# Patient Record
Sex: FEMALE | Race: WHITE | Employment: OTHER | URBAN - METROPOLITAN AREA
[De-identification: names, ages, dates, MRNs, and addresses within clinical notes are randomized per-mention and may not be internally consistent; named-entity substitution may affect disease eponyms.]

---

## 2018-06-19 ENCOUNTER — HOSPITAL ENCOUNTER (OUTPATIENT)
Age: 63
Discharge: HOME | End: 2018-06-19
Payer: COMMERCIAL

## 2018-06-19 LAB
ALBUMIN SERPL-MCNC: 3.9 G/DL (ref 3.5–5.2)
ALP BLD-CCNC: 89 U/L (ref 35–104)
ALT SERPL-CCNC: 21 U/L (ref 0–32)
ANION GAP SERPL CALCULATED.3IONS-SCNC: 12 MMOL/L (ref 7–16)
AST SERPL-CCNC: 15 U/L (ref 0–31)
BILIRUB SERPL-MCNC: 0.4 MG/DL (ref 0–1.2)
BUN BLDV-MCNC: 9 MG/DL (ref 8–23)
CALCIUM SERPL-MCNC: 9.1 MG/DL (ref 8.6–10.2)
CHLORIDE BLD-SCNC: 97 MMOL/L (ref 98–107)
CO2: 29 MMOL/L (ref 22–29)
CREAT SERPL-MCNC: 0.6 MG/DL (ref 0.5–1)
GFR AFRICAN AMERICAN: >60
GFR NON-AFRICAN AMERICAN: >60 ML/MIN/1.73
GLUCOSE BLD-MCNC: 138 MG/DL (ref 74–109)
HBA1C MFR BLD: 7.2 % (ref 4.8–5.9)
HCT VFR BLD CALC: 42.9 % (ref 34–48)
HEMOGLOBIN: 14.4 G/DL (ref 11.5–15.5)
MCH RBC QN AUTO: 31.8 PG (ref 26–35)
MCHC RBC AUTO-ENTMCNC: 33.6 % (ref 32–34.5)
MCV RBC AUTO: 94.7 FL (ref 80–99.9)
PDW BLD-RTO: 12.7 FL (ref 11.5–15)
PLATELET # BLD: 227 E9/L (ref 130–450)
PMV BLD AUTO: 9.9 FL (ref 7–12)
POTASSIUM SERPL-SCNC: 4.3 MMOL/L (ref 3.5–5)
RBC # BLD: 4.53 E12/L (ref 3.5–5.5)
SODIUM BLD-SCNC: 138 MMOL/L (ref 132–146)
TOTAL PROTEIN: 7.1 G/DL (ref 6.4–8.3)
VITAMIN D 25-HYDROXY: 16 NG/ML (ref 30–100)
WBC # BLD: 7 E9/L (ref 4.5–11.5)

## 2018-06-19 PROCEDURE — 80053 COMPREHEN METABOLIC PANEL: CPT

## 2018-06-19 PROCEDURE — 85027 COMPLETE CBC AUTOMATED: CPT

## 2018-06-19 PROCEDURE — 36415 COLL VENOUS BLD VENIPUNCTURE: CPT

## 2018-06-19 PROCEDURE — 83036 HEMOGLOBIN GLYCOSYLATED A1C: CPT

## 2018-06-19 PROCEDURE — 82306 VITAMIN D 25 HYDROXY: CPT

## 2018-11-10 ENCOUNTER — HOSPITAL ENCOUNTER (OUTPATIENT)
Dept: GENERAL RADIOLOGY | Age: 63
Discharge: HOME | End: 2018-11-12
Payer: COMMERCIAL

## 2018-11-10 ENCOUNTER — HOSPITAL ENCOUNTER (OUTPATIENT)
Age: 63
Discharge: HOME | End: 2018-11-12
Payer: COMMERCIAL

## 2018-11-10 ENCOUNTER — HOSPITAL ENCOUNTER (OUTPATIENT)
Age: 63
Discharge: HOME | End: 2018-11-10
Payer: COMMERCIAL

## 2018-11-10 DIAGNOSIS — M79.5 FOREIGN BODY (FB) IN SOFT TISSUE: ICD-10-CM

## 2018-11-10 LAB
ALBUMIN SERPL-MCNC: 4.2 G/DL (ref 3.5–5.2)
ALP BLD-CCNC: 91 U/L (ref 35–104)
ALT SERPL-CCNC: 21 U/L (ref 0–32)
ANION GAP SERPL CALCULATED.3IONS-SCNC: 10 MMOL/L (ref 7–16)
AST SERPL-CCNC: 17 U/L (ref 0–31)
BILIRUB SERPL-MCNC: 0.4 MG/DL (ref 0–1.2)
BUN BLDV-MCNC: 9 MG/DL (ref 8–23)
CALCIUM SERPL-MCNC: 9.2 MG/DL (ref 8.6–10.2)
CHLORIDE BLD-SCNC: 97 MMOL/L (ref 98–107)
CHOLESTEROL, TOTAL: 187 MG/DL (ref 0–199)
CO2: 31 MMOL/L (ref 22–29)
CREAT SERPL-MCNC: 0.7 MG/DL (ref 0.5–1)
GFR AFRICAN AMERICAN: >60
GFR NON-AFRICAN AMERICAN: >60 ML/MIN/1.73
GLUCOSE BLD-MCNC: 140 MG/DL (ref 74–99)
HBA1C MFR BLD: 7 % (ref 4–5.6)
HCT VFR BLD CALC: 44.7 % (ref 34–48)
HDLC SERPL-MCNC: 44 MG/DL
HEMOGLOBIN: 14.8 G/DL (ref 11.5–15.5)
LDL CHOLESTEROL CALCULATED: 119 MG/DL (ref 0–99)
MCH RBC QN AUTO: 31.7 PG (ref 26–35)
MCHC RBC AUTO-ENTMCNC: 33.1 % (ref 32–34.5)
MCV RBC AUTO: 95.7 FL (ref 80–99.9)
PDW BLD-RTO: 12.7 FL (ref 11.5–15)
PLATELET # BLD: 254 E9/L (ref 130–450)
PMV BLD AUTO: 10.3 FL (ref 7–12)
POTASSIUM SERPL-SCNC: 4.4 MMOL/L (ref 3.5–5)
RBC # BLD: 4.67 E12/L (ref 3.5–5.5)
SODIUM BLD-SCNC: 138 MMOL/L (ref 132–146)
TOTAL PROTEIN: 7.4 G/DL (ref 6.4–8.3)
TRIGL SERPL-MCNC: 120 MG/DL (ref 0–149)
TSH SERPL DL<=0.05 MIU/L-ACNC: 2.42 UIU/ML (ref 0.27–4.2)
VLDLC SERPL CALC-MCNC: 24 MG/DL
WBC # BLD: 7.2 E9/L (ref 4.5–11.5)

## 2018-11-10 PROCEDURE — 36415 COLL VENOUS BLD VENIPUNCTURE: CPT

## 2018-11-10 PROCEDURE — 84443 ASSAY THYROID STIM HORMONE: CPT

## 2018-11-10 PROCEDURE — 85027 COMPLETE CBC AUTOMATED: CPT

## 2018-11-10 PROCEDURE — 80053 COMPREHEN METABOLIC PANEL: CPT

## 2018-11-10 PROCEDURE — 83036 HEMOGLOBIN GLYCOSYLATED A1C: CPT

## 2018-11-10 PROCEDURE — 80061 LIPID PANEL: CPT

## 2018-11-10 PROCEDURE — 73130 X-RAY EXAM OF HAND: CPT

## 2019-02-27 ENCOUNTER — HOSPITAL ENCOUNTER (OUTPATIENT)
Age: 64
Discharge: HOME | End: 2019-02-27
Payer: COMMERCIAL

## 2019-02-27 LAB
ALBUMIN SERPL-MCNC: 3.9 G/DL (ref 3.5–5.2)
ALP BLD-CCNC: 96 U/L (ref 35–104)
ALT SERPL-CCNC: 28 U/L (ref 0–32)
ANION GAP SERPL CALCULATED.3IONS-SCNC: 10 MMOL/L (ref 7–16)
AST SERPL-CCNC: 17 U/L (ref 0–31)
BASOPHILS ABSOLUTE: 0.05 E9/L (ref 0–0.2)
BASOPHILS RELATIVE PERCENT: 0.6 % (ref 0–2)
BILIRUB SERPL-MCNC: 0.5 MG/DL (ref 0–1.2)
BUN BLDV-MCNC: 10 MG/DL (ref 8–23)
CALCIUM SERPL-MCNC: 9 MG/DL (ref 8.6–10.2)
CHLORIDE BLD-SCNC: 98 MMOL/L (ref 98–107)
CHOLESTEROL, TOTAL: 165 MG/DL (ref 0–199)
CO2: 27 MMOL/L (ref 22–29)
CREAT SERPL-MCNC: 0.6 MG/DL (ref 0.5–1)
EOSINOPHILS ABSOLUTE: 0.13 E9/L (ref 0.05–0.5)
EOSINOPHILS RELATIVE PERCENT: 1.7 % (ref 0–6)
GFR AFRICAN AMERICAN: >60
GFR NON-AFRICAN AMERICAN: >60 ML/MIN/1.73
GLUCOSE BLD-MCNC: 235 MG/DL (ref 74–99)
HBA1C MFR BLD: 9.3 % (ref 4–5.6)
HCT VFR BLD CALC: 47 % (ref 34–48)
HDLC SERPL-MCNC: 32 MG/DL
HEMOGLOBIN: 15.3 G/DL (ref 11.5–15.5)
IMMATURE GRANULOCYTES #: 0.02 E9/L
IMMATURE GRANULOCYTES %: 0.3 % (ref 0–5)
LDL CHOLESTEROL CALCULATED: 108 MG/DL (ref 0–99)
LYMPHOCYTES ABSOLUTE: 2.87 E9/L (ref 1.5–4)
LYMPHOCYTES RELATIVE PERCENT: 37.3 % (ref 20–42)
MCH RBC QN AUTO: 31 PG (ref 26–35)
MCHC RBC AUTO-ENTMCNC: 32.6 % (ref 32–34.5)
MCV RBC AUTO: 95.1 FL (ref 80–99.9)
MONOCYTES ABSOLUTE: 0.38 E9/L (ref 0.1–0.95)
MONOCYTES RELATIVE PERCENT: 4.9 % (ref 2–12)
NEUTROPHILS ABSOLUTE: 4.25 E9/L (ref 1.8–7.3)
NEUTROPHILS RELATIVE PERCENT: 55.2 % (ref 43–80)
PDW BLD-RTO: 12.6 FL (ref 11.5–15)
PLATELET # BLD: 261 E9/L (ref 130–450)
PMV BLD AUTO: 10.1 FL (ref 7–12)
POTASSIUM SERPL-SCNC: 4.3 MMOL/L (ref 3.5–5)
RBC # BLD: 4.94 E12/L (ref 3.5–5.5)
SODIUM BLD-SCNC: 135 MMOL/L (ref 132–146)
TOTAL PROTEIN: 7 G/DL (ref 6.4–8.3)
TRIGL SERPL-MCNC: 127 MG/DL (ref 0–149)
VLDLC SERPL CALC-MCNC: 25 MG/DL
WBC # BLD: 7.7 E9/L (ref 4.5–11.5)

## 2019-02-27 PROCEDURE — 83036 HEMOGLOBIN GLYCOSYLATED A1C: CPT

## 2019-02-27 PROCEDURE — 36415 COLL VENOUS BLD VENIPUNCTURE: CPT

## 2019-02-27 PROCEDURE — 80053 COMPREHEN METABOLIC PANEL: CPT

## 2019-02-27 PROCEDURE — 85025 COMPLETE CBC W/AUTO DIFF WBC: CPT

## 2019-02-27 PROCEDURE — 80061 LIPID PANEL: CPT

## 2020-01-14 ENCOUNTER — HOSPITAL ENCOUNTER (OUTPATIENT)
Age: 65
Discharge: HOME | End: 2020-01-14
Payer: COMMERCIAL

## 2020-01-14 LAB
ALBUMIN SERPL-MCNC: 4.1 G/DL (ref 3.5–5.2)
ALP BLD-CCNC: 89 U/L (ref 35–104)
ALT SERPL-CCNC: 25 U/L (ref 0–32)
ANION GAP SERPL CALCULATED.3IONS-SCNC: 10 MMOL/L (ref 7–16)
AST SERPL-CCNC: 16 U/L (ref 0–31)
BILIRUB SERPL-MCNC: 0.2 MG/DL (ref 0–1.2)
BUN BLDV-MCNC: 10 MG/DL (ref 8–23)
CALCIUM SERPL-MCNC: 9.7 MG/DL (ref 8.6–10.2)
CHLORIDE BLD-SCNC: 98 MMOL/L (ref 98–107)
CO2: 30 MMOL/L (ref 22–29)
CREAT SERPL-MCNC: 0.6 MG/DL (ref 0.5–1)
GFR AFRICAN AMERICAN: >60
GFR NON-AFRICAN AMERICAN: >60 ML/MIN/1.73
GLUCOSE FASTING: 150 MG/DL (ref 74–99)
HBA1C MFR BLD: 7.3 % (ref 4–5.6)
POTASSIUM SERPL-SCNC: 4.4 MMOL/L (ref 3.5–5)
SODIUM BLD-SCNC: 138 MMOL/L (ref 132–146)
TOTAL PROTEIN: 7.1 G/DL (ref 6.4–8.3)

## 2020-01-14 PROCEDURE — 83036 HEMOGLOBIN GLYCOSYLATED A1C: CPT

## 2020-01-14 PROCEDURE — 80053 COMPREHEN METABOLIC PANEL: CPT

## 2020-01-14 PROCEDURE — 36415 COLL VENOUS BLD VENIPUNCTURE: CPT

## 2020-06-26 LAB
AVERAGE GLUCOSE: NORMAL
HBA1C MFR BLD: 7.2 %
VITAMIN D 25-HYDROXY: 18
VITAMIN D2, 25 HYDROXY: NORMAL
VITAMIN D3,25 HYDROXY: NORMAL

## 2020-07-06 ENCOUNTER — HOSPITAL ENCOUNTER (OUTPATIENT)
Dept: MAMMOGRAPHY | Age: 65
Discharge: HOME | End: 2020-07-08
Payer: COMMERCIAL

## 2020-07-06 ENCOUNTER — HOSPITAL ENCOUNTER (OUTPATIENT)
Dept: GENERAL RADIOLOGY | Age: 65
Discharge: HOME | End: 2020-07-08
Payer: COMMERCIAL

## 2020-07-06 PROCEDURE — 77063 BREAST TOMOSYNTHESIS BI: CPT

## 2020-07-06 PROCEDURE — 71046 X-RAY EXAM CHEST 2 VIEWS: CPT

## 2020-07-20 ENCOUNTER — HOSPITAL ENCOUNTER (OUTPATIENT)
Dept: MAMMOGRAPHY | Age: 65
Discharge: HOME | End: 2020-07-22
Payer: COMMERCIAL

## 2020-07-20 ENCOUNTER — HOSPITAL ENCOUNTER (OUTPATIENT)
Dept: ULTRASOUND IMAGING | Age: 65
Discharge: HOME | End: 2020-07-20
Payer: COMMERCIAL

## 2020-07-20 PROCEDURE — 76642 ULTRASOUND BREAST LIMITED: CPT

## 2020-07-20 PROCEDURE — G0279 TOMOSYNTHESIS, MAMMO: HCPCS

## 2020-07-31 ENCOUNTER — HOSPITAL ENCOUNTER (OUTPATIENT)
Dept: GENERAL RADIOLOGY | Age: 65
Discharge: HOME | End: 2020-08-02
Payer: COMMERCIAL

## 2020-07-31 PROCEDURE — 2500000003 HC RX 250 WO HCPCS

## 2020-07-31 PROCEDURE — 77065 DX MAMMO INCL CAD UNI: CPT

## 2020-07-31 PROCEDURE — A4648 IMPLANTABLE TISSUE MARKER: HCPCS

## 2020-07-31 PROCEDURE — 88305 TISSUE EXAM BY PATHOLOGIST: CPT

## 2020-07-31 PROCEDURE — 88342 IMHCHEM/IMCYTCHM 1ST ANTB: CPT

## 2020-07-31 PROCEDURE — 88360 TUMOR IMMUNOHISTOCHEM/MANUAL: CPT

## 2020-07-31 NOTE — PROGRESS NOTES
Met with patient prior to her breast biopsy. Instructed on role of breast navigator and on breast biopsy procedure. Denies use of blood thinners or aspirin products within the past 5 days. I remained with her during the biopsy to provide instruction and emotional support. Upon questioning regarding results notification, patient indicates that she would like to receive breast biopsy results by phone via the breast navigator. Instructed that results will be available in approximately 3-5 days. Instructed that her physician will also be notified of results. Provided with folder containing my contact information, monthly breast self exam card, and post biopsy discharge instructions. Instructed to call me if she has any questions or concerns about her biopsy. Verbalizes understanding.  RACHEL Cannon, OCN, CN-BN

## 2020-08-05 ENCOUNTER — TELEPHONE (OUTPATIENT)
Dept: GENERAL RADIOLOGY | Age: 65
End: 2020-08-05

## 2020-08-07 ENCOUNTER — TELEPHONE (OUTPATIENT)
Dept: GENERAL RADIOLOGY | Age: 65
End: 2020-08-07

## 2020-08-07 NOTE — TELEPHONE ENCOUNTER
Left message at 's office to update me re: status of referral to surgeon regarding breast biopsy findings.  Electronically signed by Renée Altamirano RN, BSN on 8/7/2020 at 2:39 PM

## 2020-08-17 NOTE — PROGRESS NOTES
Subjective:      Patient ID: Isis Winters is a 60 y.o. female. HPI  History and Physical    Patient's Name/Date of Birth: Isis Winters / 1955    Date: 2020      Isis Winters presents for evaluation of newly diagnosed right breast cancer. PCP: Marietta Bowens DO, Gynecologist: Dr. Sterling Parikh. The lesion is located in the 11 o'clock position of the right breast. The lesion was discovered by mammogram. The patient states she is not consistent with self breast exams. Patient denies nipple discharge. Patient denies a personal history of breast cancer. Breast cancer risk factors include age, gender, family history of breast cancer, menopause after age 50. Ashkenazi Scientologist Ancestry: No.    OBSTETRIC RELATED HISTORY:  Age of menarche was 17. Age of menopause was 48. Patient denies hormonal therapy. Patient is . Age of first live birth was 34. Patient did breast feed. Is patient interested in fertility information about fertility preservation? N/A    CANCER SURVEILLANCE HISTORY:  Mammograms: Yes  Breast MRI's: No   Breast Biopsies: Yes   Colonoscopy: Yes   GI Polyps: Yes - benign  EGD: No   Pelvic Exam: Yes - 6 years ago  Pap Smear: Yes   Dermatology: Yes - 10 years ago, precancerous lesion on leg  Lung screening: no      Have you received your flu vaccination this year? yes  Have you received your Pneumococcal vaccination? Yes      Estimated body mass index is 26.63 kg/m² as calculated from the following:    Height as of 13: 5' 6\" (1.676 m). Weight as of 13: 165 lb (74.8 kg). Bra Size: 38 C    Because violence is so common, we ask all our patients: are you in a relationship or do you live with a person who threatens, hurts, or controls you:  Patient denies. Patient drinks significant caffeinated beverages (pot a day). Patient is a former smoker. She smoked for 10 years after graduating from college.   Patient does not use recreational drugs. Past Medical History:   Diagnosis Date    History of cardiovascular stress test 07/18/13    stress echo done       Past Surgical History:   Procedure Laterality Date    ECHOCARDIOGRAM EXERCISE STRESS TEST  7/18/2013         US BREAST NEEDLE BIOPSY RIGHT  7/31/2020    US BREAST NEEDLE BIOPSY RIGHT 7/31/2020 [facility]ARC[facility]       Current Outpatient Medications   Medication Sig Dispense Refill    Terbinafine HCl (LAMISIL PO) Take 250 mg by mouth daily.  Naproxen Sodium (ALEVE PO) Take  by mouth as needed. No current facility-administered medications for this visit.         Allergies   Allergen Reactions    Motrin [Ibuprofen] Hives       Family History   Problem Relation Age of Onset    Heart Disease Mother        Social History     Socioeconomic History    Marital status:      Spouse name: Not on file    Number of children: Not on file    Years of education: Not on file    Highest education level: Not on file   Occupational History    Not on file   Social Needs    Financial resource strain: Not on file    Food insecurity     Worry: Not on file     Inability: Not on file    Transportation needs     Medical: Not on file     Non-medical: Not on file   Tobacco Use    Smoking status: Current Every Day Smoker     Packs/day: 0.50     Years: 20.00     Pack years: 10.00   Substance and Sexual Activity    Alcohol use: Not on file    Drug use: Not on file    Sexual activity: Not on file   Lifestyle    Physical activity     Days per week: Not on file     Minutes per session: Not on file    Stress: Not on file   Relationships    Social connections     Talks on phone: Not on file     Gets together: Not on file     Attends Pentecostal service: Not on file     Active member of club or organization: Not on file     Attends meetings of clubs or organizations: Not on file     Relationship status: Not on file    Intimate partner violence     Fear of current or ex partner: Not on file     Emotionally abused: Not on file     Physically abused: Not on file     Forced sexual activity: Not on file   Other Topics Concern    Not on file   Social History Narrative    Not on file       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 Enjoys volunteer work. Quit smoking 3 years ago (after re-starting while teaching at [address] Review of Systems  CONSTITUTIONAL: No fever, chills. Good appetite and energy level. Having some hot flashes that last a long time. Not just at night. ENMT: Eyes: No diplopia; Nose: No epistaxis. Mouth: No sore throat. RESPIRATORY: No hemoptysis, shortness of breath, cough. CARDIOVASCULAR: No chest pain, pressure, or palpitations. GASTROINTESTINAL: No nausea/vomiting, abdominal pain, diarrhea/constipation. No blood in the stools. GENITOURINARY: No dysuria, urinary frequency, hematuria. NEURO: No syncope, presyncope, headache. Remainder:  ROS NEGATIVE    Patient denies previous history of DVT/PE. Review of systems as noted above completely reviewed with patient. No changes. Objective:   Physical Exam  Constitutional:       General: She is not in acute distress. Appearance: She is well-developed. She is not diaphoretic. HENT:      Head: Normocephalic and atraumatic. Eyes:      Conjunctiva/sclera: Conjunctivae normal.      Pupils: Pupils are equal, round, and reactive to light. Neck:      Musculoskeletal: Normal range of motion and neck supple. Thyroid: No thyromegaly. Trachea: No tracheal deviation. Cardiovascular:      Rate and Rhythm: Normal rate and regular rhythm. Heart sounds: Normal heart sounds. Pulmonary:      Effort: Pulmonary effort is normal. No respiratory distress. Breath sounds: Normal breath sounds. Chest:      Breasts: Breasts are symmetrical.         Right: No inverted nipple, mass, nipple discharge, skin change or tenderness.          Left: No inverted nipple, mass, nipple discharge, skin change or tenderness. Abdominal:      General: There is no distension. Palpations: Abdomen is soft. Musculoskeletal:      Right shoulder: She exhibits normal range of motion. Left shoulder: She exhibits normal range of motion. Right elbow: She exhibits no swelling. Right wrist: She exhibits no swelling. Lymphadenopathy:      Cervical: No cervical adenopathy. Upper Body:      Right upper body: Axillary adenopathy present. No supraclavicular adenopathy. Left upper body: No supraclavicular adenopathy. Skin:     General: Skin is warm and dry. Coloration: Skin is not pale. Findings: No erythema. Neurological:      Mental Status: She is alert and oriented to person, place, and time. Psychiatric:         Behavior: Behavior normal.         Thought Content: Thought content normal.         Judgment: Judgment normal.             Assessment:      60 y.o. woman who underwent a right breast ultrasound guided biopsy at the 11:00 position on July 31, 2020. Pathology completed at [address]:    Right breast, 11:00, biopsy:   Invasive ductal carcinoma, with focal mucinous features, see comment. Comment:   The invasive carcinoma is Ann Marie grade 2 (score of 6): tubule   formation score 3, nuclear pleomorphism score 1, mitotic activity score   2. P63 immunostain is negative for myoepithelial layer within the invasive   carcinoma.      Breast Cancer Marker Studies:   Estrogen Receptors (ER):   -Positive (>10%of cells demonstrate nuclear positivity):   Percentage of cells positive: 99%   Intensity: moderate-to-strong)     Progesterone Receptors (NJ):   -Positive:   Percentage of cells positive: 40%   Intensity: moderate     Hormone receptor studies are performed by immunohistochemistry on   formalin-fixed, paraffin-embedded tissue (Roche Benchmark Immunostainer,   [place] anti-ER clone [id number], anti-NJ clone [id number], polymer-based detection malignancy.  Ultrasound-guided core    biopsy is suggested.         BIRADS:    BIRADS - CATEGORY 5        8/25/2020 CBC & CMP:  8/25/2020 10:32 AM - ChristianoKit burnett Incoming Results From [facility]     Component  Value  Ref Range & Units  Status  Collected  Lab    WBC  7.2  4.5 - 11.5 E9/L  Final  08/25/2020 10:00 AM  [facility]    RBC  4.50  3.50 - 5.50 E12/L  Final  08/25/2020 10:00 AM  [facility]    Hemoglobin  13.8  11.5 - 15.5 g/dL  Final  08/25/2020 10:00 AM  [facility] Lab    Hematocrit  43.1  34.0 - 48.0 %  Final  08/25/2020 10:00 AM  [facility] Lab    MCV  95.8  80.0 - 99.9 fL  Final  08/25/2020 10:00 AM  [facility]    MCH  30.7  26.0 - 35.0 pg  Final  08/25/2020 10:00 AM  [facility]    MCHC  32.0  32.0 - 34.5 %  Final  08/25/2020 10:00 AM  [facility]    RDW  12.8  11.5 - 15.0 fL  Final  08/25/2020 10:00 AM  [address] Lab    Platelets  135  188 - 450 E9/L  Final  08/25/2020 10:00 AM  [facility]    MPV  10.2  7.0 - 12.0 fL  Final  08/25/2020 10:00 AM  [facility]    Neutrophils %  55.8  43.0 - 80.0 %  Final  08/25/2020 10:00 AM  [facility]    Immature Granulocytes %  0.1  0.0 - 5.0 %  Final  08/25/2020 10:00 AM  [facility]    Lymphocytes %  36.5  20.0 - 42.0 %  Final  08/25/2020 10:00 AM  [facility]    Monocytes %  5.2  2.0 - 12.0 %  Final  08/25/2020 10:00 AM  [facility]    Eosinophils %  1.8  0.0 - 6.0 %  Final  08/25/2020 10:00 AM  [facility]    Basophils %  0.6  0.0 - 2.0 %  Final  08/25/2020 10:00 AM  [facility]    Neutrophils Absolute  4.00  1.80 - 7.30 E9/L  Final  08/25/2020 10:00 AM  [facility]    Immature Granulocytes #  0.01  E9/L Final  08/25/2020 10:00 AM  [facility]    Lymphocytes Absolute  2.62  1.50 - 4.00 E9/L  Final  08/25/2020 10:00 AM  [facility]    Monocytes Absolute  0.37  0.10 - 0.95 E9/L  Final  08/25/2020 10:00 AM  [facility]    Eosinophils Absolute  0.13  0.05 - 0.50 E9/L  Final  08/25/2020 10:00 AM  [facility]    Basophils Absolute  0.04  0.00 - 0.20 E9/L  Final  08/25/2020 10:00 AM  [facility]        8/25/2020 10:58 AM - Kit Alvarado Incoming Results From [facility]     Component  Value  Ref Range & Units  Status  Collected  Lab    Sodium  137  132 - 146 mmol/L  Final  08/25/2020 10:00 AM  [facility]    Potassium  4.3  3.5 - 5.0 mmol/L  Final  08/25/2020 10:00 AM  [facility]    Chloride  100  98 - 107 mmol/L  Final  08/25/2020 10:00 AM  [facility] Lab    CO2  27  22 - 29 mmol/L  Final  08/25/2020 10:00 AM  [facility]    Anion Gap  10  7 - 16 mmol/L  Final  08/25/2020 10:00 AM  [facility]    Glucose  150High    74 - 99 mg/dL  Final  08/25/2020 10:00 AM  [facility] Lab    BUN  12  8 - 23 mg/dL  Final  08/25/2020 10:00 AM  [facility]    CREATININE  0.6  0.5 - 1.0 mg/dL  Final  08/25/2020 10:00 AM  [facility]    GFR Non-African American  >60  >=60 mL/min/1.73  Final  08/25/2020 10:00 AM  [facility]    Chronic Kidney Disease: less than 60 ml/min/1.73 sq. m.         Kidney Failure: less than 15 ml/min/1.73 sq.m. Results valid for patients 18 years and older.     GFR   >60   Final  08/25/2020 10:00 AM  [facility] Lab    Calcium  9.1  8.6 - 10.2 mg/dL  Final  08/25/2020 10:00 AM  [facility]    Total Protein  6.7  6.4 - 8.3 g/dL  Final  08/25/2020 10:00 AM  [facility] for conservative therapy. Questions answered to patient satisfaction. Plan:      1. Metastatic workup to include CXR, CBC, CMP, completed previously. 2. Breast MRI completed. 3. Genetic testing sample obtained. 4. Patient has met with our Breast Navigator for information and to receive literature. 5. If indicated outside slides will be obtained and reviewed by Pathology at [facility]. 6. She would likely be a candidate for conservative therapy pending results of genetic testing. .    7. We had a discussion of the treatment options for breast cancer including risks, benefits, and recurrence rates for breast conservation therapy versus mastectomy. We discussed reconstruction options. We discussed the indications and risks of sentinel lymph node biopsy and possibility of axillary lymph node dissection. We also discussed the possible role of systemic and radiation therapy. NCCN guidelines were utilized in our discussion. The patient was given the appropriate contact numbers and will call with any questions or concerns. Face-to-face time 50 minutes, greater than 50% in counseling, education, and coordination of care. My final recommendation will be communicated back to the referring physician by way of shared medical record and/or written letter via [address] [facility] [facility] mail. I personally and independently saw and examined patient and reviewed all pertinent laboratory data and imaging studies. I have reviewed and agree with the CNP history and review of systems as documented in the above note. This document is generated, in part, by voice recognition software and thus syntax and grammatical errors are possible. Yuli Prasad MD [facility]  August 31, 2020

## 2020-08-19 ENCOUNTER — TELEPHONE (OUTPATIENT)
Dept: BREAST CENTER | Age: 65
End: 2020-08-19

## 2020-08-19 NOTE — TELEPHONE ENCOUNTER
Patient notified about [facility] multidisciplinary conference recommendations which includes a breast MRI. Patient agrees to proceed and is aware to get lab work at a nearby facility if the MRI is prior to her consult with us.

## 2020-08-19 NOTE — TELEPHONE ENCOUNTER
Spoke with patient and updated obstetric related and cancer surveillance history. This information will be used for medical decision making and planning for the upcoming surgical consultation on August 31, 2020 with Dr. Yuliana Orozco.     Patient will stop in to have lab work and genetic testing done next Tuesday around 9:30am.

## 2020-08-21 ENCOUNTER — TELEPHONE (OUTPATIENT)
Dept: BREAST CENTER | Age: 65
End: 2020-08-21

## 2020-08-21 NOTE — TELEPHONE ENCOUNTER
No prior authorization required for breast MRI 12304 -- per computerized system with [employer]. Provider and facility are in network so no authorization is required.

## 2020-08-25 ENCOUNTER — HOSPITAL ENCOUNTER (OUTPATIENT)
Age: 65
Discharge: HOME | End: 2020-08-27
Payer: COMMERCIAL

## 2020-08-25 LAB
ALBUMIN SERPL-MCNC: 4 G/DL (ref 3.5–5.2)
ALP BLD-CCNC: 81 U/L (ref 35–104)
ALT SERPL-CCNC: 28 U/L (ref 0–32)
ANION GAP SERPL CALCULATED.3IONS-SCNC: 10 MMOL/L (ref 7–16)
AST SERPL-CCNC: 19 U/L (ref 0–31)
BASOPHILS ABSOLUTE: 0.04 E9/L (ref 0–0.2)
BASOPHILS RELATIVE PERCENT: 0.6 % (ref 0–2)
BILIRUB SERPL-MCNC: <0.2 MG/DL (ref 0–1.2)
BUN BLDV-MCNC: 12 MG/DL (ref 8–23)
CALCIUM SERPL-MCNC: 9.1 MG/DL (ref 8.6–10.2)
CHLORIDE BLD-SCNC: 100 MMOL/L (ref 98–107)
CO2: 27 MMOL/L (ref 22–29)
CREAT SERPL-MCNC: 0.6 MG/DL (ref 0.5–1)
EOSINOPHILS ABSOLUTE: 0.13 E9/L (ref 0.05–0.5)
EOSINOPHILS RELATIVE PERCENT: 1.8 % (ref 0–6)
GFR AFRICAN AMERICAN: >60
GFR NON-AFRICAN AMERICAN: >60 ML/MIN/1.73
GLUCOSE BLD-MCNC: 150 MG/DL (ref 74–99)
HCT VFR BLD CALC: 43.1 % (ref 34–48)
HEMOGLOBIN: 13.8 G/DL (ref 11.5–15.5)
IMMATURE GRANULOCYTES #: 0.01 E9/L
IMMATURE GRANULOCYTES %: 0.1 % (ref 0–5)
LYMPHOCYTES ABSOLUTE: 2.62 E9/L (ref 1.5–4)
LYMPHOCYTES RELATIVE PERCENT: 36.5 % (ref 20–42)
MCH RBC QN AUTO: 30.7 PG (ref 26–35)
MCHC RBC AUTO-ENTMCNC: 32 % (ref 32–34.5)
MCV RBC AUTO: 95.8 FL (ref 80–99.9)
MONOCYTES ABSOLUTE: 0.37 E9/L (ref 0.1–0.95)
MONOCYTES RELATIVE PERCENT: 5.2 % (ref 2–12)
NEUTROPHILS ABSOLUTE: 4 E9/L (ref 1.8–7.3)
NEUTROPHILS RELATIVE PERCENT: 55.8 % (ref 43–80)
PDW BLD-RTO: 12.8 FL (ref 11.5–15)
PLATELET # BLD: 254 E9/L (ref 130–450)
PMV BLD AUTO: 10.2 FL (ref 7–12)
POTASSIUM SERPL-SCNC: 4.3 MMOL/L (ref 3.5–5)
RBC # BLD: 4.5 E12/L (ref 3.5–5.5)
SODIUM BLD-SCNC: 137 MMOL/L (ref 132–146)
TOTAL PROTEIN: 6.7 G/DL (ref 6.4–8.3)
WBC # BLD: 7.2 E9/L (ref 4.5–11.5)

## 2020-08-25 PROCEDURE — 80053 COMPREHEN METABOLIC PANEL: CPT

## 2020-08-25 PROCEDURE — 85025 COMPLETE CBC W/AUTO DIFF WBC: CPT

## 2020-08-25 NOTE — PROGRESS NOTES
Upon educating the patient, she meets criteria for testing of BRCA related mutations implicated in breast and ovarian cancer. This is by virtue of her having a diagnosis of breast cancer combined with either one of the following criteria as described by NCCN guidelines:  Personal history of breast cancer at age 60 + one or more of the following:     Family History   Problem Relation Age of Onset    Heart Disease Mother      Cancer Father 46         esophageal and lung    Cancer Maternal Aunt 48         breast    Cancer Paternal Grandmother [de-identified]         throat and neck    Cancer Maternal Cousin 48         breast    Cancer Other           maternal great aunt - breast    Cancer Maternal Aunt 48         breast    Cancer Maternal Cousin 54         breast     Pre-Test Education and Risk Assessment During the patient's first visit, the patient has completed the \"Family History Questionnaire\" along with personal information pertinent to assessing risk factors. This information is used to complete the genetic assessment. Informed Consent Procedures Education along with the information guide \"Hereditary Breast and Ovarian Cancer Syndrome, A Patient's Guide to risk assessment\" is provided to the patient with additional resources listed with the information guide. Informed consent is obtained for all genetic testing, and the limitations and benefits of testing are discussed. The specific type of test to be completed, the cost of the tests, possible test results, and the implications of these results are reviewed with the individual. Written consent is obtained prior to testing. Testing  Confidentiality Standards Privacy is maintained in accordance with institutional guidelines. No patient files are coded. Information obtained is kept in a secure medical record.  Any information regarding genetic testing cannot be released without the written consent of the individual.   Testing Genetic testing is coordinated and sent to in-house and outside institutions that are CAP/CLIA approved. Available Testing  Cancer/Syndrome Gene  Breast & Ovarian Cancer BRCA1, BRCA2       Blood specimen obtained with today's visit. Educational brochure given to patient to take home.     After considering the risks, benefits, and limitations, the patient chose to pursue and provided informed consent for the following testing:   Integrated BRACAnalysis with Rapid Vocabulary [facility] Hereditary Cancer Update Test

## 2020-08-27 ENCOUNTER — HOSPITAL ENCOUNTER (OUTPATIENT)
Dept: MRI IMAGING | Age: 65
Discharge: HOME | End: 2020-08-29
Payer: COMMERCIAL

## 2020-08-27 PROCEDURE — 6360000004 HC RX CONTRAST MEDICATION: Performed by: RADIOLOGY

## 2020-08-27 PROCEDURE — 77049 MRI BREAST C-+ W/CAD BI: CPT

## 2020-08-27 PROCEDURE — A9585 GADOBUTROL INJECTION: HCPCS | Performed by: RADIOLOGY

## 2020-08-27 RX ADMIN — GADOBUTROL 8 ML: 604.72 INJECTION INTRAVENOUS at 08:21

## 2020-08-28 ENCOUNTER — TELEPHONE (OUTPATIENT)
Dept: CASE MANAGEMENT | Age: 65
End: 2020-08-28

## 2020-08-31 ENCOUNTER — HOSPITAL ENCOUNTER (OUTPATIENT)
Dept: GENERAL RADIOLOGY | Age: 65
Discharge: HOME | End: 2020-09-02
Payer: COMMERCIAL

## 2020-08-31 ENCOUNTER — OFFICE VISIT (OUTPATIENT)
Dept: BREAST CENTER | Age: 65
End: 2020-08-31
Payer: COMMERCIAL

## 2020-08-31 ENCOUNTER — TELEPHONE (OUTPATIENT)
Dept: CASE MANAGEMENT | Age: 65
End: 2020-08-31

## 2020-08-31 VITALS
OXYGEN SATURATION: 98 % | TEMPERATURE: 96.7 F | DIASTOLIC BLOOD PRESSURE: 80 MMHG | SYSTOLIC BLOOD PRESSURE: 138 MMHG | RESPIRATION RATE: 16 BRPM | BODY MASS INDEX: 28.93 KG/M2 | HEIGHT: 66 IN | HEART RATE: 92 BPM | WEIGHT: 180 LBS

## 2020-08-31 PROCEDURE — 88341 IMHCHEM/IMCYTCHM EA ADD ANTB: CPT

## 2020-08-31 PROCEDURE — 99203 OFFICE O/P NEW LOW 30 MIN: CPT | Performed by: SURGERY

## 2020-08-31 PROCEDURE — 2500000003 HC RX 250 WO HCPCS

## 2020-08-31 PROCEDURE — 88342 IMHCHEM/IMCYTCHM 1ST ANTB: CPT

## 2020-08-31 PROCEDURE — 38505 NEEDLE BIOPSY LYMPH NODES: CPT

## 2020-08-31 PROCEDURE — 36415 COLL VENOUS BLD VENIPUNCTURE: CPT | Performed by: SURGERY

## 2020-08-31 PROCEDURE — 99204 OFFICE O/P NEW MOD 45 MIN: CPT | Performed by: SURGERY

## 2020-08-31 PROCEDURE — 10035 PLMT SFT TISS LOCLZJ DEV 1ST: CPT

## 2020-08-31 PROCEDURE — 88305 TISSUE EXAM BY PATHOLOGIST: CPT

## 2020-08-31 SDOH — HEALTH STABILITY: MENTAL HEALTH: HOW OFTEN DO YOU HAVE A DRINK CONTAINING ALCOHOL?: NEVER

## 2020-08-31 NOTE — PROGRESS NOTES
Met with patient prior to her right axillary biopsy. Instructed on biopsy procedure. Denies use of blood thinners or aspirin products within the past 5 days. I remained with her during the biopsy to provide instruction and emotional support. nstructed that results will be available in approximately 3-5 days, and that  or myself will be in contact regarding biopsy findings. Provided with post biopsy discharge instructions. Instructed to call me if she has any questions or concerns about her biopsy. Verbalizes understanding.  RACHEL Cannon, OCN, CN-BN

## 2020-08-31 NOTE — TELEPHONE ENCOUNTER
Met with patient regarding her recent breast cancer diagnosis. Instructed in detail on her breast biopsy pathology findings including cancer type Right breast (IDC) and hormone receptor status (ER+, AK+, Her2-). Instructed on next steps including breast surgery options, lymph node biopsy procedures and additional imaging that may be required. Informed patient that this is the beginning of their breast cancer journey and when treatment has been completed she will receive a [address] detailing the events of her specific treatment plan. Provided with extensive literature including \"Be A Survivor: Your guide to breast cancer treatment\", Your Guide to Your Breast Cancer Pathology Report, American Cancer society Exercises after breast surgery and Lymphedema Early Signs and Symptoms. Written materials on local and national support and informational groups. Today patient received copies of their pathology and imaging reports (if available) as well as a list of local medical oncology providers. Provided patient with my contact information, office hours, and encouragement to call me with questions or concerns. Patient verbalizes understanding and appreciative of nurse navigator visit. Emotional support provided and greater than 30 minutes spent with patient. Nurse navigator will continue to follow.

## 2020-09-11 ENCOUNTER — TELEPHONE (OUTPATIENT)
Dept: BREAST CENTER | Age: 65
End: 2020-09-11

## 2020-09-11 NOTE — TELEPHONE ENCOUNTER
Patient is scheduled for NM Bone scans/CT scan 9/18/20 at [facility]. Arrival time 9:15am.  NPO after midnight. No prior authorization required per [facility] choice insurance.   Patient has been notified

## 2020-09-11 NOTE — TELEPHONE ENCOUNTER
Patient called inquiring about her axillary biopsy results and genetic testing. Genetic testing is still pending. I discussed the results of her axillary biopsy report. Patient is also aware that CTs chest, abdomen, pelvis and bone scan are being scheduled, per protocol. Right axillary lymph node, core needle biopsy: Metastatic, moderately   differentiated ductal carcinoma (grade 2)     Comment:      The tumor cells are immunoreactive with cytokeratin 7, CLAYTON   3, and mammaglobin.  The malignant cells are negative for cytokeratin 20.    The results from the recent right breast core needle biopsy (HES   ) are noted.  Breast marker studies are not repeated. Intradepartmental consultation is obtained.

## 2020-09-16 ENCOUNTER — TELEPHONE (OUTPATIENT)
Dept: CASE MANAGEMENT | Age: 65
End: 2020-09-16

## 2020-09-16 NOTE — TELEPHONE ENCOUNTER
Patient called to say that she is going for a second opinion at [facility] with Dr. Valeria Jennings, she states her appointment is on 9/18/20, same as her CT and bone scan, talked with patient she is going to keep her CT and bone scan appointments and reschedule Dr. Valeria Jennings for the following week, so that she can take all her images with her for him to review.

## 2020-09-18 ENCOUNTER — HOSPITAL ENCOUNTER (OUTPATIENT)
Dept: CT IMAGING | Age: 65
Discharge: HOME | End: 2020-09-20
Payer: COMMERCIAL

## 2020-09-18 ENCOUNTER — HOSPITAL ENCOUNTER (OUTPATIENT)
Dept: NUCLEAR MEDICINE | Age: 65
Discharge: HOME | End: 2020-09-20
Payer: COMMERCIAL

## 2020-09-18 PROCEDURE — 6360000004 HC RX CONTRAST MEDICATION: Performed by: RADIOLOGY

## 2020-09-18 PROCEDURE — 3430000000 HC RX DIAGNOSTIC RADIOPHARMACEUTICAL: Performed by: RADIOLOGY

## 2020-09-18 PROCEDURE — 2580000003 HC RX 258: Performed by: RADIOLOGY

## 2020-09-18 PROCEDURE — 74177 CT ABD & PELVIS W/CONTRAST: CPT

## 2020-09-18 PROCEDURE — 71260 CT THORAX DX C+: CPT

## 2020-09-18 PROCEDURE — A9503 TC99M MEDRONATE: HCPCS | Performed by: RADIOLOGY

## 2020-09-18 PROCEDURE — 78306 BONE IMAGING WHOLE BODY: CPT

## 2020-09-18 RX ORDER — TC 99M MEDRONATE 20 MG/10ML
25 INJECTION, POWDER, LYOPHILIZED, FOR SOLUTION INTRAVENOUS
Status: COMPLETED | OUTPATIENT
Start: 2020-09-18 | End: 2020-09-18

## 2020-09-18 RX ORDER — SODIUM CHLORIDE 0.9 % (FLUSH) 0.9 %
10 SYRINGE (ML) INJECTION
Status: COMPLETED | OUTPATIENT
Start: 2020-09-18 | End: 2020-09-18

## 2020-09-18 RX ADMIN — Medication 10 ML: at 10:46

## 2020-09-18 RX ADMIN — IOHEXOL 50 ML: 240 INJECTION, SOLUTION INTRATHECAL; INTRAVASCULAR; INTRAVENOUS; ORAL at 10:46

## 2020-09-18 RX ADMIN — IOPAMIDOL 110 ML: 755 INJECTION, SOLUTION INTRAVENOUS at 10:46

## 2020-09-18 RX ADMIN — TC 99M MEDRONATE 25 MILLICURIE: 20 INJECTION, POWDER, LYOPHILIZED, FOR SOLUTION INTRAVENOUS at 09:31

## 2020-09-21 ENCOUNTER — TELEPHONE (OUTPATIENT)
Dept: BREAST CENTER | Age: 65
End: 2020-09-21

## 2020-09-21 NOTE — TELEPHONE ENCOUNTER
Called to discuss imaging results. No evidence of metastatic disease however there were some findings on chest imaging. IMPRESSION:  Soft tissue nodule in the right breast with  moderately enlarged right  axillary lymph nodes concerning for metastatic breast malignancy. Moderate mediastinal adenopathy is also noted, nonspecific finding. Consider PET CT surveillance.     Atelectasis/groundglass opacity in the lungs. Clinical surveillance  recommended to exclude pneumonia.     Severe scoliosis with ankylosis and degenerative changes in the  thoracic spine with  nonspecific lytic lesions. Surveillance  recommended.     Patient scheduled to be seen for a second opinion consultation. Will call us afterwards if there is any way we can help her going forward.

## 2020-09-22 ENCOUNTER — TELEPHONE (OUTPATIENT)
Dept: BREAST CENTER | Age: 65
End: 2020-09-22

## 2020-09-22 NOTE — TELEPHONE ENCOUNTER
Patient returned call. Discussed results of genetic testing. Patient states she has her second opinion at [facility] on Friday 9/25/2020. Patient will let us know what her plan is after that.

## 2020-09-22 NOTE — TELEPHONE ENCOUNTER
Left a message with Brandy Stephenson in reference to her [facility] genetic analysis. The results are: Negative. No clinically significant mutation identified. Call back number provided. I will notify Dr. Batsheva Wiseman and a copy will go to Medical and Radiation Oncologist when and if appropriate. I will mail her copy along with the [facility] Understanding your results booklet.

## 2020-10-01 ENCOUNTER — TELEPHONE (OUTPATIENT)
Dept: BREAST CENTER | Age: 65
End: 2020-10-01

## 2020-11-10 ENCOUNTER — TELEPHONE (OUTPATIENT)
Dept: BREAST CENTER | Age: 65
End: 2020-11-10

## 2020-11-10 NOTE — TELEPHONE ENCOUNTER
Left message at both contact numbers to follow up on where she will be proceeding with her care. Call back number provided.

## 2021-02-10 ENCOUNTER — HOSPITAL ENCOUNTER (OUTPATIENT)
Age: 66
Discharge: HOME | End: 2021-02-10
Payer: COMMERCIAL

## 2021-02-10 LAB
ANION GAP SERPL CALCULATED.3IONS-SCNC: 9 MMOL/L (ref 7–16)
BUN BLDV-MCNC: 9 MG/DL (ref 8–23)
CALCIUM SERPL-MCNC: 9.3 MG/DL (ref 8.6–10.2)
CHLORIDE BLD-SCNC: 100 MMOL/L (ref 98–107)
CO2: 27 MMOL/L (ref 22–29)
CREAT SERPL-MCNC: 0.6 MG/DL (ref 0.5–1)
GFR AFRICAN AMERICAN: >60
GFR NON-AFRICAN AMERICAN: >60 ML/MIN/1.73
GLUCOSE BLD-MCNC: 142 MG/DL (ref 74–99)
HCT VFR BLD CALC: 44.1 % (ref 34–48)
HEMOGLOBIN: 14.7 G/DL (ref 11.5–15.5)
MCH RBC QN AUTO: 31.7 PG (ref 26–35)
MCHC RBC AUTO-ENTMCNC: 33.3 % (ref 32–34.5)
MCV RBC AUTO: 95.2 FL (ref 80–99.9)
PDW BLD-RTO: 12.7 FL (ref 11.5–15)
PLATELET # BLD: 284 E9/L (ref 130–450)
PMV BLD AUTO: 9.3 FL (ref 7–12)
POTASSIUM SERPL-SCNC: 4.3 MMOL/L (ref 3.5–5)
RBC # BLD: 4.63 E12/L (ref 3.5–5.5)
SODIUM BLD-SCNC: 136 MMOL/L (ref 132–146)
WBC # BLD: 8.2 E9/L (ref 4.5–11.5)

## 2021-02-10 PROCEDURE — 85027 COMPLETE CBC AUTOMATED: CPT

## 2021-02-10 PROCEDURE — 36415 COLL VENOUS BLD VENIPUNCTURE: CPT

## 2021-02-10 PROCEDURE — 93005 ELECTROCARDIOGRAM TRACING: CPT | Performed by: SURGERY

## 2021-02-10 PROCEDURE — 80048 BASIC METABOLIC PNL TOTAL CA: CPT

## 2021-02-11 LAB
EKG ATRIAL RATE: 90 BPM
EKG P AXIS: 54 DEGREES
EKG P-R INTERVAL: 154 MS
EKG Q-T INTERVAL: 356 MS
EKG QRS DURATION: 68 MS
EKG QTC CALCULATION (BAZETT): 435 MS
EKG R AXIS: -26 DEGREES
EKG T AXIS: 54 DEGREES
EKG VENTRICULAR RATE: 90 BPM

## 2021-06-01 LAB
AVERAGE GLUCOSE: NORMAL
HBA1C MFR BLD: 6.6 %
VITAMIN D 25-HYDROXY: 22
VITAMIN D2, 25 HYDROXY: NORMAL
VITAMIN D3,25 HYDROXY: NORMAL

## 2021-11-24 ENCOUNTER — HOSPITAL ENCOUNTER (EMERGENCY)
Age: 66
Discharge: HOME | End: 2021-11-24
Payer: COMMERCIAL

## 2021-11-24 VITALS
WEIGHT: 163 LBS | RESPIRATION RATE: 16 BRPM | SYSTOLIC BLOOD PRESSURE: 152 MMHG | TEMPERATURE: 98 F | OXYGEN SATURATION: 94 % | HEIGHT: 66 IN | HEART RATE: 106 BPM | DIASTOLIC BLOOD PRESSURE: 70 MMHG | BODY MASS INDEX: 26.2 KG/M2

## 2021-11-24 DIAGNOSIS — S61.211A LACERATION OF LEFT INDEX FINGER WITHOUT FOREIGN BODY WITHOUT DAMAGE TO NAIL, INITIAL ENCOUNTER: Primary | ICD-10-CM

## 2021-11-24 PROCEDURE — 12001 RPR S/N/AX/GEN/TRNK 2.5CM/<: CPT

## 2021-11-24 PROCEDURE — 6370000000 HC RX 637 (ALT 250 FOR IP): Performed by: NURSE PRACTITIONER

## 2021-11-24 PROCEDURE — 6370000000 HC RX 637 (ALT 250 FOR IP)

## 2021-11-24 PROCEDURE — 99283 EMERGENCY DEPT VISIT LOW MDM: CPT

## 2021-11-24 RX ORDER — ATORVASTATIN CALCIUM 20 MG/1
20 TABLET, FILM COATED ORAL DAILY
COMMUNITY
End: 2022-02-25

## 2021-11-24 RX ADMIN — Medication: at 23:13

## 2021-11-24 ASSESSMENT — PAIN SCALES - GENERAL: PAINLEVEL_OUTOF10: 5

## 2021-11-24 ASSESSMENT — PAIN DESCRIPTION - LOCATION: LOCATION: HAND

## 2021-11-24 ASSESSMENT — PAIN DESCRIPTION - PAIN TYPE: TYPE: ACUTE PAIN

## 2021-11-25 NOTE — ED NOTES
Pt presented to the ED c/o finger laceration. Site cleaned and DuoDerm placed.      Pierre Alvarez RN  11/24/21 [id number]

## 2021-12-01 NOTE — ED PROVIDER NOTES
[facility]    Department of Emergency Medicine   ED  Provider Note  Admit Date/RoomTime: 11/24/2021 10:03 PM  ED Room: 25/25    History of Present Illness:   Conrad Wagner is a 61 y.o. female presenting to the ED for laceration to left hand index finger which occurred prior to arrival.  The complaint has been persistent, mild in severity, and worsened by movement of finger. Patient accidentally cut her finger with a knife while cooking. She applied some gauze to the area with tape prior to arrival.  Tetanus shot up to date within the last five years. Patient denies any other injuries from this incident. Denies numbness, tingling, weakness or sensory compromise. Patient has full range of motion. Bleeding is controlled. Review of Systems:   A complete review of systems was performed and pertinent positives and negatives are stated within HPI, all other systems reviewed and are negative.          --------------------------------------------- PAST HISTORY ---------------------------------------------  Past Medical History:  has a past medical history of History of cardiovascular stress test and Scoliosis. Past Surgical History:  has a past surgical history that includes ECHO Exercise Stress Test (7/18/2013); US BREAST BIOPSY W LOC DEVICE 1ST LESION RIGHT (7/31/2020); back surgery (1973); and [facility] [address] (8/31/2020). Social History:  reports that she quit smoking about 4 years ago. She has a 10.00 pack-year smoking history. She has never used smokeless tobacco. She reports that she does not drink alcohol and does not use drugs. Family History: family history includes Cancer in an other family member; Cancer (age of onset: 48) in her maternal aunt, maternal aunt, and maternal cousin; Cancer (age of onset: 46) in her father; Cancer (age of onset: 54) in her maternal cousin; Cancer (age of onset: [de-identified]) in her paternal grandmother; Heart Disease in her mother. Unless otherwise noted, family history is non contributory    The patients home medications have been reviewed. Allergies: Patient has no known allergies. -------------------------------------------------- RESULTS -------------------------------------------------  All laboratory and radiology results have been personally reviewed by myself   LABS:  No results found for this visit on 11/24/21. RADIOLOGY:  Interpreted by Radiologist.  No orders to display       ------------------------- NURSING NOTES AND VITALS REVIEWED ---------------------------   The nursing notes within the ED encounter and vital signs as below have been reviewed. BP (!) 152/70   Pulse 106   Temp 98 °F (36.7 °C)   Resp 16   Ht 5' 6\" (1.676 m)   Wt 163 lb (73.9 kg)   SpO2 94%   BMI 26.31 kg/m²   Oxygen Saturation Interpretation: Normal      ---------------------------------------------------PHYSICAL EXAM--------------------------------------    Constitutional/General: Alert and oriented x3, well appearing, non toxic in NAD, pleasant  Head: Normocephalic and atraumatic  Eyes: PERRL, EOMI, conjunctiva normal, sclera non icteric, no eye drainage  Mouth:  handling secretions, no trismus, No tongue/lip swelling. Neck: Supple, full ROM,  no stridor, no crepitus, no meningeal signs. No lymphadenopathy. Respiratory: Lungs clear to auscultation bilaterally, no wheezes, rales, or rhonchi. Not in respiratory distress. Respirations easy and unlabored. Cardiovascular:  S1S2. Regular rate. Regular rhythm. No murmurs, gallops, or rubs. 2+ distal pulses  Chest: No chest wall tenderness  GI:  Abdomen Soft, Non tender, Non distended. +BS x 4 quadrants. No organomegaly, no palpable masses,  No rebound, guarding, or rigidity. Musculoskeletal: Moves all extremities x 4. Warm and well perfused, no clubbing, cyanosis, or edema. Capillary refill <3 seconds.  strength strong bilaterally.   Full flexion and extension of all fingers on the left hand.  Radial and brachial pulses 2+ bilaterally. Full flexion-extension of wrist and elbows bilaterally. There is a 1 c \"present on them palmar aspect of the left index finger. Nailbed is not involved. There is no evidence of tendon involvement. There is no evidence of open fracture. This is a very superficial avulsion of the epidermis. Integument: skin warm and dry. No rashes. Lymphatic: no lymphadenopathy noted  Neurologic: GCS 15, no focal deficits, symmetric strength 5/5 in the upper and lower extremities bilaterally, neurovascularly intact  Psychiatric: Normal Affect      ------------------------------ ED COURSE/MEDICAL DECISION MAKING----------------------  Medications   topical skin adhesive stick ( Topical Given 11/24/21 8879)         LACERATION REPAIR  PROCEDURE NOTE:   Performed By:  Trini Falcon CNP    Laceration #: 1. Location: Left index finger  Length:  1 cm. The wound area was irrigated with sterile saline, cleansed with povidone iodine, cleansend with shur-clens and draped in a sterile fashion. The wound area was anesthetized with not required. WOUND COMPLEXITY:    Debridement: None. Undermining: None. Wound Margins Revised: None. Flaps Aligned: yes. The wound was explored with the following results No foreign bodies found, No tendon laceration seen, no foreign body or tendon injury seen. The wound was closed with Dermabond. Dressing:  a sterile dressing was placed and a finger splint placed            Medical Decision Making:    Patient is a 72-year-old female presenting to the emergency department for an accidental laceration to the left index finger, palmar aspect. Tetanus shot is up-to-date within the last 5 years. Patient explained procedure, risks, and benefits. Patient provides verbal consent for procedure. Procedure documented as above. Patient tolerated well. Patient educated to refrain from submerging hand into water.   Patient educated to refrain from applying any ointments or lotions on top of the glue as it will cause it to disintegrate. Patient to monitor for signs of infection which we spoke about. Patient to return for further evaluation if any signs of infection appear or if she develops any new symptoms. Counseling: The emergency provider has spoken with the patient and discussed todays results, in addition to providing specific details for the plan of care and counseling regarding the diagnosis and prognosis. Questions are answered at this time and they are agreeable with the plan.      --------------------------------- IMPRESSION AND DISPOSITION ---------------------------------    IMPRESSION  1.  Laceration of left index finger without foreign body without damage to nail, initial encounter        DISPOSITION  Disposition: Discharge to home  Patient condition is stable             URBANO Lindsay CNP  11/30/21 1544

## 2022-02-03 ENCOUNTER — TELEPHONE (OUTPATIENT)
Dept: PRIMARY CARE CLINIC | Age: 67
End: 2022-02-03

## 2022-02-03 NOTE — TELEPHONE ENCOUNTER
Phone call to patient to schedule follow up appointment. Patient agreeable to seeing Dr. Gio Flower.  Appointment scheduled for 2/15/22.

## 2022-02-03 NOTE — TELEPHONE ENCOUNTER
----- Message from Valerie Rey sent at 2/3/2022  3:51 PM EST -----  Subject: Appointment Request    Reason for Call: Routine Existing Condition Follow Up    QUESTIONS  Type of Appointment? Established Patient  Reason for appointment request? No appointments available during search  Additional Information for Provider? Patient is trying to be seen for an   adult post op and other existing conditions needs an appointment before   May, nothing is showing available until after May. Patient has a lot of   appointments that need to be made before then. Patient ask that PCP office   reach out Asap.   ---------------------------------------------------------------------------  --------------  [employer]  What is the best way for the office to contact you? OK to leave message on   voicemail  Preferred Call Back Phone Number? [phone]  ---------------------------------------------------------------------------  --------------  SCRIPT ANSWERS  Relationship to Patient? Self  Is this follow up request related to routine Diabetes Management? No  Have you been diagnosed with, awaiting test results for, or told that you   are suspected of having COVID-19 (Coronavirus)? (If patient has tested   negative or was tested as a requirement for work, school, or travel and   not based on symptoms, answer no)? No  Within the past two weeks have you developed any of the following symptoms   (answer no if symptoms have been present longer than 2 weeks or began   more than 2 weeks ago)? Fever or Chills, Cough, Shortness of breath or   difficulty breathing, Loss of taste or smell, Sore throat, Nasal   congestion, Sneezing or runny nose, Fatigue or generalized body aches   (answer no if pain is specific to a body part e.g. back pain), Diarrhea,   Headache? No  Have you had close contact with someone with COVID-19 in the last 14 days? No  (Service Expert  click yes below to proceed with [employer] As Usual   Scheduling)?  Yes

## 2022-02-08 ENCOUNTER — OFFICE VISIT (OUTPATIENT)
Dept: PRIMARY CARE CLINIC | Age: 67
End: 2022-02-08
Payer: COMMERCIAL

## 2022-02-08 VITALS
WEIGHT: 166 LBS | TEMPERATURE: 98.4 F | DIASTOLIC BLOOD PRESSURE: 72 MMHG | HEART RATE: 100 BPM | SYSTOLIC BLOOD PRESSURE: 130 MMHG | OXYGEN SATURATION: 96 % | BODY MASS INDEX: 26.68 KG/M2 | HEIGHT: 66 IN

## 2022-02-08 DIAGNOSIS — C50.911 CARCINOMA OF RIGHT BREAST METASTATIC TO AXILLARY LYMPH NODE (HCC): ICD-10-CM

## 2022-02-08 DIAGNOSIS — I89.0 LYMPHEDEMA: ICD-10-CM

## 2022-02-08 DIAGNOSIS — E55.9 VITAMIN D DEFICIENCY: ICD-10-CM

## 2022-02-08 DIAGNOSIS — E11.9 TYPE 2 DIABETES MELLITUS WITHOUT COMPLICATION, WITHOUT LONG-TERM CURRENT USE OF INSULIN (HCC): ICD-10-CM

## 2022-02-08 DIAGNOSIS — M75.01 ADHESIVE CAPSULITIS OF BOTH SHOULDERS: Primary | ICD-10-CM

## 2022-02-08 DIAGNOSIS — M75.02 ADHESIVE CAPSULITIS OF BOTH SHOULDERS: Primary | ICD-10-CM

## 2022-02-08 DIAGNOSIS — F41.9 ANXIETY AND DEPRESSION: ICD-10-CM

## 2022-02-08 DIAGNOSIS — Z12.11 COLON CANCER SCREENING: ICD-10-CM

## 2022-02-08 DIAGNOSIS — F32.A ANXIETY AND DEPRESSION: ICD-10-CM

## 2022-02-08 DIAGNOSIS — C77.3 CARCINOMA OF RIGHT BREAST METASTATIC TO AXILLARY LYMPH NODE (HCC): ICD-10-CM

## 2022-02-08 PROBLEM — M41.9 SCOLIOSIS: Status: ACTIVE | Noted: 2022-02-08

## 2022-02-08 PROBLEM — M75.00 ADHESIVE CAPSULITIS OF SHOULDER: Status: ACTIVE | Noted: 2022-02-08

## 2022-02-08 PROCEDURE — G8427 DOCREV CUR MEDS BY ELIG CLIN: HCPCS | Performed by: FAMILY MEDICINE

## 2022-02-08 PROCEDURE — 3017F COLORECTAL CA SCREEN DOC REV: CPT | Performed by: FAMILY MEDICINE

## 2022-02-08 PROCEDURE — 3046F HEMOGLOBIN A1C LEVEL >9.0%: CPT | Performed by: FAMILY MEDICINE

## 2022-02-08 PROCEDURE — 2022F DILAT RTA XM EVC RTNOPTHY: CPT | Performed by: FAMILY MEDICINE

## 2022-02-08 PROCEDURE — G8484 FLU IMMUNIZE NO ADMIN: HCPCS | Performed by: FAMILY MEDICINE

## 2022-02-08 PROCEDURE — 99214 OFFICE O/P EST MOD 30 MIN: CPT | Performed by: FAMILY MEDICINE

## 2022-02-08 PROCEDURE — 1036F TOBACCO NON-USER: CPT | Performed by: FAMILY MEDICINE

## 2022-02-08 PROCEDURE — G8419 CALC BMI OUT NRM PARAM NOF/U: HCPCS | Performed by: FAMILY MEDICINE

## 2022-02-08 RX ORDER — TRAMADOL HYDROCHLORIDE 50 MG/1
50 TABLET ORAL EVERY 6 HOURS PRN
Qty: 30 TABLET | Refills: 0 | Status: SHIPPED
Start: 2022-02-08 | End: 2022-02-10 | Stop reason: SDUPTHER

## 2022-02-08 RX ORDER — ANASTROZOLE 1 MG/1
TABLET ORAL
COMMUNITY
Start: 2022-01-12

## 2022-02-08 RX ORDER — GABAPENTIN 300 MG/1
300 CAPSULE ORAL 2 TIMES DAILY
Qty: 180 CAPSULE | Refills: 3 | Status: SHIPPED | OUTPATIENT
Start: 2022-02-08 | End: 2022-08-07

## 2022-02-08 RX ORDER — GABAPENTIN 300 MG/1
CAPSULE ORAL
COMMUNITY
Start: 2020-06-26 | End: 2022-02-08 | Stop reason: SDUPTHER

## 2022-02-08 SDOH — ECONOMIC STABILITY: FOOD INSECURITY: WITHIN THE PAST 12 MONTHS, YOU WORRIED THAT YOUR FOOD WOULD RUN OUT BEFORE YOU GOT MONEY TO BUY MORE.: NEVER TRUE

## 2022-02-08 SDOH — ECONOMIC STABILITY: FOOD INSECURITY: WITHIN THE PAST 12 MONTHS, THE FOOD YOU BOUGHT JUST DIDN'T LAST AND YOU DIDN'T HAVE MONEY TO GET MORE.: NEVER TRUE

## 2022-02-08 ASSESSMENT — PATIENT HEALTH QUESTIONNAIRE - PHQ9
9. THOUGHTS THAT YOU WOULD BE BETTER OFF DEAD, OR OF HURTING YOURSELF: 0
SUM OF ALL RESPONSES TO PHQ QUESTIONS 1-9: 2
4. FEELING TIRED OR HAVING LITTLE ENERGY: 2
8. MOVING OR SPEAKING SO SLOWLY THAT OTHER PEOPLE COULD HAVE NOTICED. OR THE OPPOSITE, BEING SO FIGETY OR RESTLESS THAT YOU HAVE BEEN MOVING AROUND A LOT MORE THAN USUAL: 0
10. IF YOU CHECKED OFF ANY PROBLEMS, HOW DIFFICULT HAVE THESE PROBLEMS MADE IT FOR YOU TO DO YOUR WORK, TAKE CARE OF THINGS AT HOME, OR GET ALONG WITH OTHER PEOPLE: 0
7. TROUBLE CONCENTRATING ON THINGS, SUCH AS READING THE NEWSPAPER OR WATCHING TELEVISION: 0
SUM OF ALL RESPONSES TO PHQ QUESTIONS 1-9: 2
6. FEELING BAD ABOUT YOURSELF - OR THAT YOU ARE A FAILURE OR HAVE LET YOURSELF OR YOUR FAMILY DOWN: 0
2. FEELING DOWN, DEPRESSED OR HOPELESS: 0
SUM OF ALL RESPONSES TO PHQ9 QUESTIONS 1 & 2: 0
SUM OF ALL RESPONSES TO PHQ QUESTIONS 1-9: 2
5. POOR APPETITE OR OVEREATING: 0
SUM OF ALL RESPONSES TO PHQ QUESTIONS 1-9: 2
1. LITTLE INTEREST OR PLEASURE IN DOING THINGS: 0
3. TROUBLE FALLING OR STAYING ASLEEP: 0

## 2022-02-08 ASSESSMENT — SOCIAL DETERMINANTS OF HEALTH (SDOH): HOW HARD IS IT FOR YOU TO PAY FOR THE VERY BASICS LIKE FOOD, HOUSING, MEDICAL CARE, AND HEATING?: NOT HARD AT ALL

## 2022-02-08 NOTE — PROGRESS NOTES
Anastasia Watters (:  1955) is a 61 y.o. female,Established patient, here for evaluation of the following chief complaint(s): Other (L shoulder clavicle discomfort for months) and Diabetes (149 in am high after dinner weak and fatigued )         ASSESSMENT/PLAN:  1. Adhesive capsulitis of both shoulders  -     XR SHOULDER RIGHT (MIN 2 VIEWS); Future  -     traMADol (ULTRAM) 50 MG tablet; Take 1 tablet by mouth every 6 hours as needed for Pain for up to 30 days. Intended supply: 5 days. Take lowest dose possible to manage pain, Disp-30 tablet, R-0Normal  -     XR SHOULDER LEFT (MIN 2 VIEWS); Future  2. Type 2 diabetes mellitus without complication, without long-term current use of insulin (HCC)  -     CBC; Future  -     Comprehensive Metabolic Panel, Fasting; Future  -     Lipid Panel; Future  -     Hemoglobin A1C; Future  -     TSH without Reflex; Future  3. Anxiety and depression  -     CBC; Future  -     Comprehensive Metabolic Panel, Fasting; Future  -     TSH without Reflex; Future  4. Vitamin D deficiency  -     CBC; Future  -     Comprehensive Metabolic Panel, Fasting; Future  -     Vitamin D 25 Hydroxy; Future  -     TSH without Reflex; Future  5. Colon cancer screening  -     [facility] - Sammie Diaz MD,Gastroenterology, [place]  6. Carcinoma of right breast metastatic to axillary lymph node ([facility] [address]  7. Lymphedema      PLAN:   May require further imaging to include an MRI.  Referral sent for colonoscopy.  Consider referral to Dr. Donald Clemente.  Labs ordered.  X-ray bilateral shoulders. Return if symptoms worsen or fail to improve. Subjective   SUBJECTIVE/OBJECTIVE:  Patient here for complaints of bilateral shoulder discomfort worse on the left. Onset several months ago with decreased range of motion. Initially had discomfort in her right shoulder postoperatively following her surgeries last year.   Currently, discomfort and decreased range of motion is worse in the left arm with clavicular pain as well. She continues to follow at [facility] for her Right Breast Cancer with Lymph Node Metastasis. She reports that she had a bone scan done which demonstrated no evidence of bony metastasis. She has been diagnosed with Adhesive Capsulitis of the Shoulder. Physical Therapy Was Recommended Previously. Additionally,  She states her blood sugars been running in the 140 range in the morning and around 200 at night. She feels weak and fatigued. Has not hemoglobin A1c done in approximately 6 to 7 months. Did not bring her fingerstick blood sugars in for my review. Additionally remains overdue for colonoscopy. Review of Systems   Constitutional: Positive for fatigue. Negative for chills and fever. HENT: Negative for congestion, ear discharge, ear pain, facial swelling, hearing loss, nosebleeds, rhinorrhea, sinus pressure and sore throat. Eyes: Negative for photophobia, pain, discharge, itching and visual disturbance. Respiratory: Negative for cough, shortness of breath and wheezing. Cardiovascular: Negative for chest pain, palpitations and leg swelling. Gastrointestinal: Negative for abdominal distention, abdominal pain, blood in stool, constipation, diarrhea, nausea and vomiting. Endocrine: Negative for polydipsia, polyphagia and polyuria. Genitourinary: Negative for difficulty urinating, dysuria, frequency, hematuria and urgency. Musculoskeletal: Positive for arthralgias (Bilateral shoulders) and back pain. Negative for joint swelling and myalgias. Skin: Negative for color change and rash. Allergic/Immunologic: Negative for environmental allergies and food allergies. Neurological: Negative for dizziness, seizures, syncope, weakness, numbness and headaches. Psychiatric/Behavioral: Negative for confusion, hallucinations and suicidal ideas. The patient is not nervous/anxious.           Immunization History   Administered Date(s) Administered    COVID-19, Moderna, Primary or Immunocompromised, PF, 100mcg/0.5mL 02/06/2021, 03/06/2021, 08/31/2021    Influenza A (G8O0-01) Vaccine PF IM 11/10/2009    Influenza Virus Vaccine 09/02/2014, 09/01/2015, 09/19/2016, 09/01/2017    Influenza, Kiel Aparicio, IM, PF (6 mo and older Fluzone, Flulaval, Fluarix, and 3 yrs and older Afluria) 09/24/2018, 10/30/2020    Td vaccine (adult) 04/12/2005    Tdap (Boostrix, Adacel) 09/07/2015    Zoster Recombinant (Shingrix) 08/31/2021, 12/04/2021         Objective   /72   Pulse 100   Temp 98.4 °F (36.9 °C)   Ht 5' 6\" (1.676 m)   Wt 166 lb (75.3 kg)   SpO2 96%   BMI 26.79 kg/m²   Current Outpatient Medications   Medication Sig Dispense Refill    anastrozole (ARIMIDEX) 1 MG tablet TAKE 1 TABLET BY MOUTH EVERY DAY      vitamin D (CHOLECALCIFEROL) 42245 UNIT CAPS Take by mouth      gabapentin (NEURONTIN) 300 MG capsule Take 1 capsule by mouth 2 times daily for 180 days. 180 capsule 3    traMADol (ULTRAM) 50 MG tablet Take 1 tablet by mouth every 6 hours as needed for Pain for up to 30 days. Intended supply: 5 days. Take lowest dose possible to manage pain 30 tablet 0    atorvastatin (LIPITOR) 20 MG tablet Take 20 mg by mouth daily      Naproxen Sodium (ALEVE PO) Take  by mouth as needed.  metFORMIN (GLUCOPHAGE) 500 MG tablet Take 2 tablets by mouth 2 times daily 360 tablet 3    blood glucose test strips (ONETOUCH ULTRA) strip CHECK ONCE DAILY 100 strip 3     No current facility-administered medications for this visit. Physical Exam  Vitals and nursing note reviewed. Constitutional:       General: She is not in acute distress. Appearance: Normal appearance. She is normal weight. HENT:      Head: Normocephalic and atraumatic. Right Ear: Tympanic membrane, ear canal and external ear normal. There is no impacted cerumen. Left Ear: Tympanic membrane, ear canal and external ear normal. There is no impacted cerumen.       Nose: Nose normal.      Mouth/Throat: Mouth: Mucous membranes are moist.      Pharynx: No oropharyngeal exudate or posterior oropharyngeal erythema. Eyes:      Extraocular Movements: Extraocular movements intact. Conjunctiva/sclera: Conjunctivae normal.      Pupils: Pupils are equal, round, and reactive to light. Neck:      Vascular: No carotid bruit. Cardiovascular:      Rate and Rhythm: Normal rate and regular rhythm. Pulses: Normal pulses. Heart sounds: Normal heart sounds. No murmur heard. Pulmonary:      Effort: No respiratory distress. Breath sounds: No wheezing, rhonchi or rales. Abdominal:      General: Bowel sounds are normal.   Musculoskeletal:         General: No swelling, tenderness or deformity. Right shoulder: No swelling, deformity, effusion, tenderness or crepitus. Decreased range of motion. Decreased strength. Normal pulse. Left shoulder: Bony tenderness (Clavicle and humeral head) present. No swelling, deformity, effusion or crepitus. Decreased range of motion. Decreased strength. Normal pulse. Cervical back: Normal range of motion. Right lower leg: No edema. Left lower leg: No edema. Comments: Bilateral shoulder range of motion significantly restricted in all planes. Sanders and Neer test are positive bilaterally. There is weakness bilateral shoulders with manual motor testing left worse than right. Lymphadenopathy:      Cervical: No cervical adenopathy. Skin:     General: Skin is warm and dry. Neurological:      General: No focal deficit present. Mental Status: She is alert and oriented to person, place, and time. Cranial Nerves: No cranial nerve deficit. Psychiatric:         Mood and Affect: Mood normal.         Behavior: Behavior normal.         Thought Content:  Thought content normal.         Judgment: Judgment normal.            CBC  WBC   Date Value Ref Range Status   02/10/2021 8.2 4.5 - 11.5 E9/L Final     RBC   Date Value Ref Range Status 02/10/2021 4.63 3.50 - 5.50 E12/L Final     Hemoglobin   Date Value Ref Range Status   02/10/2021 14.7 11.5 - 15.5 g/dL Final     Hematocrit   Date Value Ref Range Status   02/10/2021 44.1 34.0 - 48.0 % Final     MCV   Date Value Ref Range Status   02/10/2021 95.2 80.0 - 99.9 fL Final     MCH   Date Value Ref Range Status   02/10/2021 31.7 26.0 - 35.0 pg Final     MCHC   Date Value Ref Range Status   02/10/2021 33.3 32.0 - 34.5 % Final     RDW   Date Value Ref Range Status   02/10/2021 12.7 11.5 - 15.0 fL Final     Platelets   Date Value Ref Range Status   02/10/2021 284 130 - 450 E9/L Final     MPV   Date Value Ref Range Status   02/10/2021 9.3 7.0 - 12.0 fL Final     Neutrophils %   Date Value Ref Range Status   08/25/2020 55.8 43.0 - 80.0 % Final     Immature Granulocytes #   Date Value Ref Range Status   08/25/2020 0.01 E9/L Final     Immature Granulocytes %   Date Value Ref Range Status   08/25/2020 0.1 0.0 - 5.0 % Final     Lymphocytes %   Date Value Ref Range Status   08/25/2020 36.5 20.0 - 42.0 % Final     Monocytes %   Date Value Ref Range Status   08/25/2020 5.2 2.0 - 12.0 % Final     Eosinophils %   Date Value Ref Range Status   08/25/2020 1.8 0.0 - 6.0 % Final     Basophils %   Date Value Ref Range Status   08/25/2020 0.6 0.0 - 2.0 % Final     Neutrophils Absolute   Date Value Ref Range Status   08/25/2020 4.00 1.80 - 7.30 E9/L Final     Lymphocytes Absolute   Date Value Ref Range Status   08/25/2020 2.62 1.50 - 4.00 E9/L Final     Monocytes Absolute   Date Value Ref Range Status   08/25/2020 0.37 0.10 - 0.95 E9/L Final     Eosinophils Absolute   Date Value Ref Range Status   08/25/2020 0.13 0.05 - 0.50 E9/L Final     Basophils Absolute   Date Value Ref Range Status   08/25/2020 0.04 0.00 - 0.20 E9/L Final       CMP  Sodium   Date Value Ref Range Status   02/10/2021 136 132 - 146 mmol/L Final     Potassium   Date Value Ref Range Status   02/10/2021 4.3 3.5 - 5.0 mmol/L Final     Chloride   Date Value Ref Range Status   02/10/2021 100 98 - 107 mmol/L Final     CO2   Date Value Ref Range Status   02/10/2021 27 22 - 29 mmol/L Final     Anion Gap   Date Value Ref Range Status   02/10/2021 9 7 - 16 mmol/L Final     Glucose   Date Value Ref Range Status   02/10/2021 142 (H) 74 - 99 mg/dL Final     BUN   Date Value Ref Range Status   02/10/2021 9 8 - 23 mg/dL Final     CREATININE   Date Value Ref Range Status   02/10/2021 0.6 0.5 - 1.0 mg/dL Final     GFR Non-   Date Value Ref Range Status   02/10/2021 >60 >=60 mL/min/1.73 Final     Comment:     Chronic Kidney Disease: less than 60 ml/min/1.73 sq.m. Kidney Failure: less than 15 ml/min/1.73 sq.m. Results valid for patients 18 years and older. GFR    Date Value Ref Range Status   02/10/2021 >60  Final     Calcium   Date Value Ref Range Status   02/10/2021 9.3 8.6 - 10.2 mg/dL Final     Total Protein   Date Value Ref Range Status   08/25/2020 6.7 6.4 - 8.3 g/dL Final     Albumin   Date Value Ref Range Status   08/25/2020 4.0 3.5 - 5.2 g/dL Final     Total Bilirubin   Date Value Ref Range Status   08/25/2020 <0.2 0.0 - 1.2 mg/dL Final     Alkaline Phosphatase   Date Value Ref Range Status   08/25/2020 81 35 - 104 U/L Final     ALT   Date Value Ref Range Status   08/25/2020 28 0 - 32 U/L Final     AST   Date Value Ref Range Status   08/25/2020 19 0 - 31 U/L Final       TSH  Lab Results   Component Value Date    TSH 2.420 11/10/2018       A1C  Lab Results   Component Value Date    LABA1C 6.6 06/01/2021       LIPID  Lab Results   Component Value Date    CHOL 165 02/27/2019    TRIG 127 02/27/2019    HDL 32 02/27/2019    LDLCALC 108 (H) 02/27/2019    LABVLDL 25 02/27/2019        No results found for this visit on 02/08/22. An electronic signature was used to authenticate this note.     --Syliva Range, DO

## 2022-02-09 RX ORDER — BLOOD SUGAR DIAGNOSTIC
STRIP MISCELLANEOUS
Qty: 100 STRIP | Refills: 3 | Status: SHIPPED | OUTPATIENT
Start: 2022-02-09

## 2022-02-09 ASSESSMENT — ENCOUNTER SYMPTOMS
EYE DISCHARGE: 0
COUGH: 0
ABDOMINAL PAIN: 0
FACIAL SWELLING: 0
DIARRHEA: 0
EYE PAIN: 0
WHEEZING: 0
SORE THROAT: 0
NAUSEA: 0
BACK PAIN: 1
CONSTIPATION: 0
EYE ITCHING: 0
PHOTOPHOBIA: 0
SINUS PRESSURE: 0
RHINORRHEA: 0
VOMITING: 0
ABDOMINAL DISTENTION: 0
SHORTNESS OF BREATH: 0
BLOOD IN STOOL: 0
COLOR CHANGE: 0

## 2022-02-10 DIAGNOSIS — M75.01 ADHESIVE CAPSULITIS OF BOTH SHOULDERS: ICD-10-CM

## 2022-02-10 DIAGNOSIS — M75.02 ADHESIVE CAPSULITIS OF BOTH SHOULDERS: ICD-10-CM

## 2022-02-10 LAB
AVERAGE GLUCOSE: 148
AVERAGE GLUCOSE: NORMAL
HBA1C MFR BLD: 6.8 %
HBA1C MFR BLD: 6.8 %
VITAMIN D 25-HYDROXY: 46.1
VITAMIN D 25-HYDROXY: NORMAL
VITAMIN D2, 25 HYDROXY: NORMAL
VITAMIN D2, 25 HYDROXY: NORMAL
VITAMIN D3,25 HYDROXY: NORMAL
VITAMIN D3,25 HYDROXY: NORMAL

## 2022-02-10 RX ORDER — TRAMADOL HYDROCHLORIDE 50 MG/1
50 TABLET ORAL EVERY 6 HOURS PRN
Qty: 30 TABLET | Refills: 0 | Status: SHIPPED | OUTPATIENT
Start: 2022-02-10 | End: 2022-03-12

## 2022-02-11 DIAGNOSIS — F41.9 ANXIETY AND DEPRESSION: ICD-10-CM

## 2022-02-11 DIAGNOSIS — E11.9 TYPE 2 DIABETES MELLITUS WITHOUT COMPLICATION, WITHOUT LONG-TERM CURRENT USE OF INSULIN (HCC): ICD-10-CM

## 2022-02-11 DIAGNOSIS — E55.9 VITAMIN D DEFICIENCY: ICD-10-CM

## 2022-02-11 DIAGNOSIS — F32.A ANXIETY AND DEPRESSION: ICD-10-CM

## 2022-02-14 ENCOUNTER — TELEPHONE (OUTPATIENT)
Dept: PRIMARY CARE CLINIC | Age: 67
End: 2022-02-14

## 2022-02-14 DIAGNOSIS — M75.01 ADHESIVE CAPSULITIS OF BOTH SHOULDERS: Primary | ICD-10-CM

## 2022-02-14 DIAGNOSIS — M75.02 ADHESIVE CAPSULITIS OF BOTH SHOULDERS: Primary | ICD-10-CM

## 2022-02-14 NOTE — TELEPHONE ENCOUNTER
Pc to pt with x-ray findings. Informed probable adhesive capsulitis . Needs PT or ortho referral. Pt would like an ortho referral states you metioned someone in [facility].  She also needs referral for colonscopy

## 2022-02-25 RX ORDER — ATORVASTATIN CALCIUM 20 MG/1
20 TABLET, FILM COATED ORAL DAILY
Qty: 30 TABLET | Refills: 5 | Status: SHIPPED
Start: 2022-02-25 | End: 2022-07-29

## 2022-02-25 NOTE — TELEPHONE ENCOUNTER
Per pt last visit she states taking 20 mg daily, however in paper chart 8/30/2021she was taking 10mg daily.

## 2022-03-22 ENCOUNTER — APPOINTMENT (OUTPATIENT)
Dept: CT IMAGING | Age: 67
End: 2022-03-22
Payer: COMMERCIAL

## 2022-03-22 ENCOUNTER — HOSPITAL ENCOUNTER (EMERGENCY)
Age: 67
Discharge: HOME | End: 2022-03-22
Attending: EMERGENCY MEDICINE
Payer: COMMERCIAL

## 2022-03-22 VITALS
SYSTOLIC BLOOD PRESSURE: 150 MMHG | RESPIRATION RATE: 18 BRPM | WEIGHT: 162 LBS | DIASTOLIC BLOOD PRESSURE: 69 MMHG | HEART RATE: 85 BPM | TEMPERATURE: 97.6 F | HEIGHT: 66 IN | OXYGEN SATURATION: 97 % | BODY MASS INDEX: 26.03 KG/M2
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
## 2022-03-22 DIAGNOSIS — S09.90XA CLOSED HEAD INJURY, INITIAL ENCOUNTER: ICD-10-CM

## 2022-03-22 DIAGNOSIS — S00.83XA CONTUSION OF FACE, INITIAL ENCOUNTER: ICD-10-CM

## 2022-03-22 PROCEDURE — 70450 CT HEAD/BRAIN W/O DYE: CPT

## 2022-03-22 PROCEDURE — 99284 EMERGENCY DEPT VISIT MOD MDM: CPT

## 2022-03-22 PROCEDURE — 70486 CT MAXILLOFACIAL W/O DYE: CPT

## 2022-03-22 PROCEDURE — 6370000000 HC RX 637 (ALT 250 FOR IP): Performed by: STUDENT IN AN ORGANIZED HEALTH CARE EDUCATION/TRAINING PROGRAM

## 2022-03-22 RX ORDER — HYDROCODONE BITARTRATE AND ACETAMINOPHEN 5; 325 MG/1; MG/1
1 TABLET ORAL ONCE
Status: COMPLETED | OUTPATIENT
Start: 2022-03-22 | End: 2022-03-22

## 2022-03-22 RX ORDER — ATORVASTATIN CALCIUM 20 MG/1
TABLET, FILM COATED ORAL
Qty: 30 TABLET | Refills: 5 | OUTPATIENT
Start: 2022-03-22

## 2022-03-22 RX ADMIN — HYDROCODONE BITARTRATE AND ACETAMINOPHEN 1 TABLET: 5; 325 TABLET ORAL at 21:59

## 2022-03-22 ASSESSMENT — ENCOUNTER SYMPTOMS
VOMITING: 0
TROUBLE SWALLOWING: 0
ABDOMINAL PAIN: 0
COUGH: 0
SHORTNESS OF BREATH: 0
DIARRHEA: 0
NAUSEA: 0
VOICE CHANGE: 0
PHOTOPHOBIA: 0

## 2022-03-22 ASSESSMENT — PAIN DESCRIPTION - LOCATION
LOCATION: HEAD
LOCATION: HEAD

## 2022-03-22 ASSESSMENT — PAIN - FUNCTIONAL ASSESSMENT
PAIN_FUNCTIONAL_ASSESSMENT: 0-10
PAIN_FUNCTIONAL_ASSESSMENT: 0-10

## 2022-03-22 ASSESSMENT — PAIN DESCRIPTION - PAIN TYPE
TYPE: ACUTE PAIN
TYPE: ACUTE PAIN

## 2022-03-22 ASSESSMENT — PAIN SCALES - GENERAL
PAINLEVEL_OUTOF10: 8
PAINLEVEL_OUTOF10: 6

## 2022-03-22 ASSESSMENT — PAIN DESCRIPTION - ORIENTATION: ORIENTATION: LEFT

## 2022-03-22 ASSESSMENT — PAIN DESCRIPTION - DESCRIPTORS
DESCRIPTORS: DISCOMFORT
DESCRIPTORS: HEADACHE

## 2022-03-23 NOTE — ED PROVIDER NOTES
HPI   Patient is a 79-year-old female presenting to emergency department status post mechanical fall. Patient states that she tripped over a box and fell straight onto her face. She apparently was acting woozy after the fall but denies any loss of consciousness. Not on any blood thinners. She has a contusion on her left cheek bone. Patient endorsing pain at the site of the contusion and a mild headache. She otherwise is denying any nausea, vomiting, dizziness, lightheadedness or syncope. Patient able to ambulate normally. Denies any other injuries other than to the face. Patient moving all 4 extremities without difficulty. No obvious injuries or deformities to the neck, chest, trunk or extremities. Patient awake alert and oriented x3 with no focal deficits noted. She did not try anything for pain at home prior to coming to the ED for evaluation. Review of Systems   Constitutional: Negative for chills and fever. HENT: Negative for trouble swallowing and voice change. Contusion of the left cheekbone status post fall. Patient has facial pain at the site of the contusion. Eyes: Negative for photophobia and visual disturbance. Respiratory: Negative for cough and shortness of breath. Cardiovascular: Negative for chest pain and palpitations. Gastrointestinal: Negative for abdominal pain, diarrhea, nausea and vomiting. Genitourinary: Negative for dysuria. Musculoskeletal: Negative for arthralgias. Skin: Negative for rash and wound. Neurological: Positive for headaches. Negative for dizziness. Psychiatric/Behavioral: Negative for behavioral problems and confusion. Physical Exam  Constitutional:       General: She is not in acute distress. Appearance: Normal appearance. She is not ill-appearing. HENT:      Head: Normocephalic. Comments: Left cheek bone contusion. No open lesions.      Right Ear: External ear normal.      Left Ear: External ear normal. Nose: Nose normal.      Mouth/Throat:      Mouth: Mucous membranes are moist.      Pharynx: Oropharynx is clear. Eyes:      Conjunctiva/sclera: Conjunctivae normal.      Pupils: Pupils are equal, round, and reactive to light. Cardiovascular:      Rate and Rhythm: Normal rate and regular rhythm. Pulses: Normal pulses. Heart sounds: Normal heart sounds. Pulmonary:      Effort: Pulmonary effort is normal. No respiratory distress. Breath sounds: Normal breath sounds. No wheezing or rales. Abdominal:      General: Abdomen is flat. Palpations: Abdomen is soft. Tenderness: There is no abdominal tenderness. There is no guarding or rebound. Musculoskeletal:         General: No swelling or signs of injury. Normal range of motion. Cervical back: Normal range of motion and neck supple. Right lower leg: No edema. Left lower leg: No edema. Skin:     General: Skin is warm and dry. Capillary Refill: Capillary refill takes less than 2 seconds. Neurological:      General: No focal deficit present. Mental Status: She is alert and oriented to person, place, and time. Sensory: No sensory deficit. Motor: No weakness. Psychiatric:         Mood and Affect: Mood normal.         Behavior: Behavior normal.          MDM   Patient is a 68-year-old female presents to the emergency department status post mechanical fall. Patient apparently tripped over an object and fell straight onto her face. She denies any loss of consciousness but was a little woozy after the fall. Patient has left cheekbone contusion but no other obvious injuries or deformities. Ambulatory status unaffected. Patient is not on blood thinners. CT head negative for any acute intracranial processes, specifically cranial hemorrhage. CT facial bones unremarkable with no evidence of acute fractures or bony abnormalities.   Discussed adding CT cervical spine x-ray to the patient in order to assess for hyperextension injuries that may have been missed on other imaging studies obtained today however she politely declined and was requesting discharge after pain medication. Patient had no bony midline tenderness to cervical spine. Full range of motion of the neck with no pain. Patient been hemodynamically stable. Work-up and results were discussed with the patient. She is agreeable to discharge with outpatient follow-up as needed. ED Course as of 03/22/22 2207   Tue Mar 22, 2022   2204   ATTENDING PROVIDER ATTESTATION:     I have personally performed and/or participated in the history, exam, medical decision making, and procedures and agree with all pertinent clinical information unless otherwise noted. I have also reviewed and agree with the past medical, family and social history unless otherwise noted. I have discussed this patient in detail with the resident and provided the instruction and education regarding the evidence-based evaluation and treatment of closed head injury. History: Patient states that she had tripped over something in the garage and fell forward flat on her face. She did not even get to be able to brace herself. She denies any neck pain. Denied loss of consciousness. Not is on blood thinners. She did report facial pain which is worse around the left eye. No change in vision. Denies any associated nausea or vomiting. My findings: Norah Hwang is a 61 y.o. female whom is in no distress. Physical exam reveals small amount of erythema lateral to the left eye. There is facial bone tenderness overlying this area. No bony crepitance. No deformities of the nasal bone. No epistaxis. No extraocular entrapment. No midline tenderness of the cervical spine. Sensation grossly intact. No focal neurological deficits. No ataxia with finger-to-nose. My plan: Symptomatic and supportive care.   Appropriate imaging    Electronically signed by Bia Vega DO on 3/22/22 at 10:04 PM EDT       [MS]   5919 Discussed adding cervical spine CT for assessment for any hyperextension injury that may not be seen on other imaging studies obtained today however patient politely declined and would like to go home. Patient has good range of motion of her neck and has no midline bony tenderness on palpation. [PP]      ED Course User Index  [MS] Leighton Dorcas   [PP] Thomas Mortensen, DO        --------------------------------------------- PAST HISTORY ---------------------------------------------  Past Medical History:  has a past medical history of Anxiety, Anxiety and depression, Degenerative joint disease of both hips, Depression, Diverticulosis, Fibrocystic breast, History of cardiovascular stress test, Invasive ductal carcinoma of breast ([address] Reactive airway disease, Scoliosis, Scoliosis, Type 2 diabetes mellitus without complication ([address] Type 2 diabetes mellitus without complication, without long-term current use of insulin ([address] Urolithiasis, Vitamin D deficiency, and Zoster. Past Surgical History:  has a past surgical history that includes ECHO Exercise Stress Test (7/18/2013); US BREAST BIOPSY W LOC DEVICE 1ST LESION RIGHT (7/31/2020); back surgery (1973); [facility] US GUIDED PLACE LOC DEVICE PERC 1ST LESION (8/31/2020); Dilation and curettage of uterus (07/1993); and Breast lumpectomy (Right, 2020). Social History:  reports that she quit smoking about 4 years ago. She has a 10.00 pack-year smoking history. She has never used smokeless tobacco. She reports that she does not drink alcohol and does not use drugs. Family History: family history includes Cancer in an other family member; Cancer (age of onset: 48) in her maternal aunt, maternal aunt, and maternal cousin; Cancer (age of onset: 46) in her father; Cancer (age of onset: 54) in her maternal cousin;  Cancer (age of onset: [de-identified]) in her paternal grandmother; Diabetes in her mother; Heart Disease in her brother and mother. The patients home medications have been reviewed. Allergies: Patient has no known allergies. -------------------------------------------------- RESULTS -------------------------------------------------  Labs:  No results found for this visit on 03/22/22. Radiology:  CT Head WO Contrast   Final Result   No acute intracranial abnormality or hemorrhage. CT FACIAL BONES WO CONTRAST   Final Result   No acute traumatic injury of the facial bones. Incidentally noted are degenerative changes in the visualized upper cervical   spine.             ------------------------- NURSING NOTES AND VITALS REVIEWED ---------------------------  Date / Time Roomed:  3/22/2022  9:44 PM  ED Bed Assignment:  [id number]    The nursing notes within the ED encounter and vital signs as below have been reviewed. BP (!) 150/69   Pulse 85   Temp 97.6 °F (36.4 °C) (Infrared)   Resp 18   Ht 5' 6\" (1.676 m)   Wt 162 lb (73.5 kg)   SpO2 97%   BMI 26.15 kg/m²   Oxygen Saturation Interpretation: Normal      ------------------------------------------ PROGRESS NOTES ------------------------------------------  I have spoken with the patient and discussed todays results, in addition to providing specific details for the plan of care and counseling regarding the diagnosis and prognosis. Their questions are answered at this time and they are agreeable with the plan. I discussed at length with them reasons for immediate return here for re evaluation. They will followup with primary care by calling their office tomorrow. --------------------------------- ADDITIONAL PROVIDER NOTES ---------------------------------  At this time the patient is without objective evidence of an acute process requiring hospitalization or inpatient management. They have remained hemodynamically stable throughout their entire ED visit and are stable for discharge with outpatient follow-up.      The plan has been discussed in detail and they are aware of the specific conditions for emergent return, as well as the importance of follow-up. New Prescriptions    No medications on file       Diagnosis:  1. Fall, initial encounter    2. Closed head injury, initial encounter    3. Contusion of face, initial encounter        Disposition:  Patient's disposition: Discharge to home  Patient's condition is stable.              Augusto Mejia DO  Resident  03/22/22 [id number]

## 2022-03-23 NOTE — ED NOTES
Pt states she had a fall at home and landed on the left side of her face. Pt has some reddened bruises forming under her left eye. She has no complaints, aside form pain. She was ambulatory from triage and she is a/ox4 with a negative NIH performed by Dr. Barbara Johnston.      Susy Camp RN  03/22/22 0060

## 2022-03-23 NOTE — ED TRIAGE NOTES
Department of Emergency Medicine  FIRST PROVIDER TRIAGE NOTE             Independent MLP           3/22/22  8:53 PM EDT    Date of Encounter: 3/22/22   MRN: [id number]      HPI: Carol Cervantes is a 61 y.o. female who presents to the ED for mechanical fall at 1800 tonight, hit head and facial area off the ground. No anticoagulation use. ROS: Negative for headache or dizziness. PE: Gen Appearance/Constitutional: alert  HEENT: NC/NT. PERRLA,  Airway patent. CV: regular rate  Pulm: CTA bilat    Vitals:    03/22/22 2009   Pulse: 125   Resp: 20   Temp: 97.6 °F (36.4 °C)   SpO2: 98%       Past medical history, surgical history, and medications reviewed. Initial Plan of Care: All treatment areas within department are currently occupied. Plan to order/initiate the following while awaiting opening in ED: imaging studies. Initiate treatment/testing, proceed to treatment area when bed available for ED Attending/MLP to continue care.     Electronically signed by URBANO Farris CNP   DD: 3/22/22

## 2022-03-23 NOTE — ED NOTES
Pt states understanding of teachingat this time, as well as follow-up. Pt has no further questions or complaints.        Christopher Ayala RN  03/22/22 [id number]

## 2022-05-31 RX ORDER — ERGOCALCIFEROL 1.25 MG/1
CAPSULE ORAL
Qty: 4 CAPSULE | Refills: 11 | Status: SHIPPED | OUTPATIENT
Start: 2022-05-31

## 2022-06-20 ENCOUNTER — OFFICE VISIT (OUTPATIENT)
Dept: PRIMARY CARE CLINIC | Age: 67
End: 2022-06-20
Payer: COMMERCIAL

## 2022-06-20 VITALS
BODY MASS INDEX: 27 KG/M2 | HEART RATE: 100 BPM | HEIGHT: 66 IN | OXYGEN SATURATION: 94 % | WEIGHT: 168 LBS | TEMPERATURE: 98.1 F | SYSTOLIC BLOOD PRESSURE: 120 MMHG | DIASTOLIC BLOOD PRESSURE: 80 MMHG

## 2022-06-20 DIAGNOSIS — F32.A ANXIETY AND DEPRESSION: ICD-10-CM

## 2022-06-20 DIAGNOSIS — B35.1 ONYCHOMYCOSIS: ICD-10-CM

## 2022-06-20 DIAGNOSIS — F41.9 ANXIETY AND DEPRESSION: ICD-10-CM

## 2022-06-20 DIAGNOSIS — E11.9 TYPE 2 DIABETES MELLITUS WITHOUT COMPLICATION, WITHOUT LONG-TERM CURRENT USE OF INSULIN (HCC): ICD-10-CM

## 2022-06-20 DIAGNOSIS — E55.9 VITAMIN D DEFICIENCY: ICD-10-CM

## 2022-06-20 DIAGNOSIS — E11.9 TYPE 2 DIABETES MELLITUS WITHOUT COMPLICATION, WITHOUT LONG-TERM CURRENT USE OF INSULIN (HCC): Primary | ICD-10-CM

## 2022-06-20 DIAGNOSIS — M41.9 SCOLIOSIS, UNSPECIFIED SCOLIOSIS TYPE, UNSPECIFIED SPINAL REGION: ICD-10-CM

## 2022-06-20 LAB
BILIRUBIN, POC: NORMAL
BLOOD URINE, POC: NORMAL
CLARITY, POC: CLEAR
COLOR, POC: YELLOW
CREATININE URINE POCT: NORMAL
GLUCOSE URINE, POC: NORMAL
HBA1C MFR BLD: 7.5 % (ref 4–5.6)
HCT VFR BLD CALC: 44.9 % (ref 34–48)
HEMOGLOBIN: 14 G/DL (ref 11.5–15.5)
KETONES, POC: NORMAL
LEUKOCYTE EST, POC: NORMAL
MCH RBC QN AUTO: 31.5 PG (ref 26–35)
MCHC RBC AUTO-ENTMCNC: 31.2 % (ref 32–34.5)
MCV RBC AUTO: 101.1 FL (ref 80–99.9)
MICROALBUMIN/CREAT 24H UR: NORMAL MG/G{CREAT}
MICROALBUMIN/CREAT UR-RTO: <30
NITRITE, POC: NORMAL
PDW BLD-RTO: 12.9 FL (ref 11.5–15)
PH, POC: 6.5
PLATELET # BLD: 278 E9/L (ref 130–450)
PMV BLD AUTO: 10.3 FL (ref 7–12)
PROTEIN, POC: NORMAL
RBC # BLD: 4.44 E12/L (ref 3.5–5.5)
SPECIFIC GRAVITY, POC: 1.02
TSH SERPL DL<=0.05 MIU/L-ACNC: 7.3 UIU/ML (ref 0.27–4.2)
UROBILINOGEN, POC: 1
VITAMIN D 25-HYDROXY: 30 NG/ML (ref 30–100)
WBC # BLD: 7.9 E9/L (ref 4.5–11.5)

## 2022-06-20 PROCEDURE — 82044 UR ALBUMIN SEMIQUANTITATIVE: CPT | Performed by: FAMILY MEDICINE

## 2022-06-20 PROCEDURE — 99214 OFFICE O/P EST MOD 30 MIN: CPT | Performed by: FAMILY MEDICINE

## 2022-06-20 PROCEDURE — G8427 DOCREV CUR MEDS BY ELIG CLIN: HCPCS | Performed by: FAMILY MEDICINE

## 2022-06-20 PROCEDURE — 3044F HG A1C LEVEL LT 7.0%: CPT | Performed by: FAMILY MEDICINE

## 2022-06-20 PROCEDURE — G8419 CALC BMI OUT NRM PARAM NOF/U: HCPCS | Performed by: FAMILY MEDICINE

## 2022-06-20 PROCEDURE — 2022F DILAT RTA XM EVC RTNOPTHY: CPT | Performed by: FAMILY MEDICINE

## 2022-06-20 PROCEDURE — 81002 URINALYSIS NONAUTO W/O SCOPE: CPT | Performed by: FAMILY MEDICINE

## 2022-06-20 PROCEDURE — 3017F COLORECTAL CA SCREEN DOC REV: CPT | Performed by: FAMILY MEDICINE

## 2022-06-20 PROCEDURE — 1036F TOBACCO NON-USER: CPT | Performed by: FAMILY MEDICINE

## 2022-06-20 RX ORDER — TERBINAFINE HYDROCHLORIDE 250 MG/1
250 TABLET ORAL DAILY
Qty: 84 TABLET | Refills: 0 | Status: SHIPPED | OUTPATIENT
Start: 2022-06-20 | End: 2022-09-12

## 2022-06-20 RX ORDER — TRAMADOL HYDROCHLORIDE 50 MG/1
50 TABLET ORAL EVERY 6 HOURS PRN
COMMUNITY

## 2022-06-20 ASSESSMENT — ENCOUNTER SYMPTOMS
SHORTNESS OF BREATH: 0
EYE DISCHARGE: 0
ABDOMINAL PAIN: 0
ABDOMINAL DISTENTION: 0
EYE ITCHING: 0
VOMITING: 0
NAUSEA: 0
BLOOD IN STOOL: 0
COLOR CHANGE: 0
RHINORRHEA: 0
CONSTIPATION: 0
EYE PAIN: 0
BACK PAIN: 1
DIARRHEA: 0
COUGH: 0
WHEEZING: 0
FACIAL SWELLING: 0
PHOTOPHOBIA: 0
SINUS PRESSURE: 0
SORE THROAT: 0

## 2022-06-20 NOTE — PROGRESS NOTES
Hanna Tomlinson (:  1955) is a 61 y.o. female,Established patient, here for evaluation of the following chief complaint(s):  Follow-up (Labs), Fatigue, and Other (pt would like rx for toenail fungus)         ASSESSMENT/PLAN:  1. Type 2 diabetes mellitus without complication, without long-term current use of insulin (HCC)  -     CBC; Future  -     Comprehensive Metabolic Panel, Fasting; Future  -     Hemoglobin A1C; Future  -     Lipid Panel; Future  -     POCT Urinalysis no Micro; Future  -     POCT microalbumin; Future  -     Amb External Referral To Podiatry  2. Vitamin D deficiency  -     CBC; Future  -     Comprehensive Metabolic Panel, Fasting; Future  -     Vitamin D 25 Hydroxy; Future  3. Anxiety and depression  -     CBC; Future  -     Comprehensive Metabolic Panel, Fasting; Future  -     TSH; Future  4. Scoliosis, unspecified scoliosis type, unspecified spinal region  5. Onychomycosis      PLAN:   Prevnar 21 next visit after the age of 74.  Referral to Dr. Ibis Gibson, podiatry.  Colonoscopy report from 3/1/2022 reviewed. Follow-up 5 years.  Continue to follow with  regarding breast cancer.  Labs drawn.  Check UA/MA prior to leaving the office.  Advise Lamisil 250 mg daily for 84 days. Return in about 6 months (around 2022) for Labs, Follow up. Subjective   SUBJECTIVE/OBJECTIVE:  Patient here for routine follow-up and labs. She complains of fatigue. Also has chronic back pain that is getting worse. She is asking about the possibility of a return visit with Dr. Moody Merrill. She is unable to exercise effectively due to increased low back pain. She does admit to occasional radiation into the lower extremities. She attributes fatigue due to her current treatment program for breast cancer. She saw Dr. Tabitha Vasquez and had 3 injections to the left shoulder which improved range of motion significantly.   She is essentially back to normal.  Did not require any treatment specifically for the right shoulder. She is requesting treatment again for her toenail fungus. Review of Systems   Constitutional: Negative for chills, fatigue and fever. HENT: Negative for congestion, ear discharge, ear pain, facial swelling, hearing loss, nosebleeds, rhinorrhea, sinus pressure and sore throat. Eyes: Negative for photophobia, pain, discharge, itching and visual disturbance. Respiratory: Negative for cough, shortness of breath and wheezing. Cardiovascular: Negative for chest pain, palpitations and leg swelling. Gastrointestinal: Negative for abdominal distention, abdominal pain, blood in stool, constipation, diarrhea, nausea and vomiting. Endocrine: Negative for polydipsia, polyphagia and polyuria. Genitourinary: Negative for difficulty urinating, dysuria, frequency, hematuria and urgency. Musculoskeletal: Positive for arthralgias, back pain, gait problem and myalgias. Negative for joint swelling. Skin: Negative for color change and rash. Complains of toenail fungus previously treated with Lamisil with some benefit. Allergic/Immunologic: Negative for environmental allergies and food allergies. Neurological: Negative for dizziness, seizures, syncope, weakness, numbness and headaches. Psychiatric/Behavioral: Negative for confusion, hallucinations and suicidal ideas. The patient is not nervous/anxious. Objective   /80   Pulse 100   Temp 98.1 °F (36.7 °C) (Temporal)   Ht 5' 6\" (1.676 m)   Wt 168 lb (76.2 kg)   SpO2 94%   Breastfeeding No   BMI 27.12 kg/m²   Current Outpatient Medications   Medication Sig Dispense Refill    traMADol (ULTRAM) 50 MG tablet Take 50 mg by mouth every 6 hours as needed for Pain.       terbinafine (LAMISIL) 250 MG tablet Take 1 tablet by mouth daily 84 tablet 0    vitamin D (ERGOCALCIFEROL) 1.25 MG (74002 UT) CAPS capsule TAKE 1 CAPSULE WEEKLY 4 capsule 11    atorvastatin (LIPITOR) 20 MG tablet Take 1 tablet by mouth daily 30 tablet 5    metFORMIN (GLUCOPHAGE) 500 MG tablet Take 2 tablets by mouth 2 times daily 360 tablet 3    blood glucose test strips (ONETOUCH ULTRA) strip CHECK ONCE DAILY 100 strip 3    anastrozole (ARIMIDEX) 1 MG tablet TAKE 1 TABLET BY MOUTH EVERY DAY      vitamin D (CHOLECALCIFEROL) 41746 UNIT CAPS Take by mouth      gabapentin (NEURONTIN) 300 MG capsule Take 1 capsule by mouth 2 times daily for 180 days. 180 capsule 3    Naproxen Sodium (ALEVE PO) Take  by mouth as needed. No current facility-administered medications for this visit. Physical Exam  Vitals and nursing note reviewed. Constitutional:       General: She is not in acute distress. Appearance: Normal appearance. HENT:      Head: Normocephalic and atraumatic. Right Ear: Tympanic membrane, ear canal and external ear normal. There is no impacted cerumen. Left Ear: Tympanic membrane, ear canal and external ear normal. There is no impacted cerumen. Nose: Nose normal.      Mouth/Throat:      Mouth: Mucous membranes are moist.      Pharynx: No oropharyngeal exudate or posterior oropharyngeal erythema. Eyes:      General: No scleral icterus. Extraocular Movements: Extraocular movements intact. Conjunctiva/sclera: Conjunctivae normal.      Pupils: Pupils are equal, round, and reactive to light. Neck:      Vascular: No carotid bruit. Comments: Trachea midline. No JVD. No thyromegaly or nodules. Cardiovascular:      Rate and Rhythm: Normal rate and regular rhythm. Pulses: Normal pulses. Heart sounds: Normal heart sounds. No murmur heard. Pulmonary:      Effort: Pulmonary effort is normal. No respiratory distress. Breath sounds: Normal breath sounds. No wheezing, rhonchi or rales. Abdominal:      General: Bowel sounds are normal.   Musculoskeletal:         General: Deformity (Evidence of moderate scoliosis. ) present. No swelling or tenderness.       Cervical back: Normal range of motion. Right lower leg: No edema. Left lower leg: No edema. Lymphadenopathy:      Cervical: No cervical adenopathy. Skin:     General: Skin is warm and dry. Comments: Toenails are thickened. Unable to determine how mycotic due to toenail polish. Neurological:      General: No focal deficit present. Mental Status: She is alert and oriented to person, place, and time. Cranial Nerves: No cranial nerve deficit. Psychiatric:         Mood and Affect: Mood normal.         Behavior: Behavior normal.         Thought Content:  Thought content normal.         Judgment: Judgment normal.            CBC  WBC   Date Value Ref Range Status   02/10/2021 8.2 4.5 - 11.5 E9/L Final     RBC   Date Value Ref Range Status   02/10/2021 4.63 3.50 - 5.50 E12/L Final     Hemoglobin   Date Value Ref Range Status   02/10/2021 14.7 11.5 - 15.5 g/dL Final     Hematocrit   Date Value Ref Range Status   02/10/2021 44.1 34.0 - 48.0 % Final     MCV   Date Value Ref Range Status   02/10/2021 95.2 80.0 - 99.9 fL Final     MCH   Date Value Ref Range Status   02/10/2021 31.7 26.0 - 35.0 pg Final     MCHC   Date Value Ref Range Status   02/10/2021 33.3 32.0 - 34.5 % Final     RDW   Date Value Ref Range Status   02/10/2021 12.7 11.5 - 15.0 fL Final     Platelets   Date Value Ref Range Status   02/10/2021 284 130 - 450 E9/L Final     MPV   Date Value Ref Range Status   02/10/2021 9.3 7.0 - 12.0 fL Final     Neutrophils %   Date Value Ref Range Status   08/25/2020 55.8 43.0 - 80.0 % Final     Immature Granulocytes #   Date Value Ref Range Status   08/25/2020 0.01 E9/L Final     Immature Granulocytes %   Date Value Ref Range Status   08/25/2020 0.1 0.0 - 5.0 % Final     Lymphocytes %   Date Value Ref Range Status   08/25/2020 36.5 20.0 - 42.0 % Final     Monocytes %   Date Value Ref Range Status   08/25/2020 5.2 2.0 - 12.0 % Final     Eosinophils %   Date Value Ref Range Status   08/25/2020 1.8 0.0 - 6.0 % Final     Basophils %   Date Value Ref Range Status   08/25/2020 0.6 0.0 - 2.0 % Final     Neutrophils Absolute   Date Value Ref Range Status   08/25/2020 4.00 1.80 - 7.30 E9/L Final     Lymphocytes Absolute   Date Value Ref Range Status   08/25/2020 2.62 1.50 - 4.00 E9/L Final     Monocytes Absolute   Date Value Ref Range Status   08/25/2020 0.37 0.10 - 0.95 E9/L Final     Eosinophils Absolute   Date Value Ref Range Status   08/25/2020 0.13 0.05 - 0.50 E9/L Final     Basophils Absolute   Date Value Ref Range Status   08/25/2020 0.04 0.00 - 0.20 E9/L Final       CMP  Sodium   Date Value Ref Range Status   02/10/2021 136 132 - 146 mmol/L Final     Potassium   Date Value Ref Range Status   02/10/2021 4.3 3.5 - 5.0 mmol/L Final     Chloride   Date Value Ref Range Status   02/10/2021 100 98 - 107 mmol/L Final     CO2   Date Value Ref Range Status   02/10/2021 27 22 - 29 mmol/L Final     Anion Gap   Date Value Ref Range Status   02/10/2021 9 7 - 16 mmol/L Final     Glucose   Date Value Ref Range Status   02/10/2021 142 (H) 74 - 99 mg/dL Final     BUN   Date Value Ref Range Status   02/10/2021 9 8 - 23 mg/dL Final     CREATININE   Date Value Ref Range Status   02/10/2021 0.6 0.5 - 1.0 mg/dL Final     GFR Non-   Date Value Ref Range Status   02/10/2021 >60 >=60 mL/min/1.73 Final     Comment:     Chronic Kidney Disease: less than 60 ml/min/1.73 sq.m. Kidney Failure: less than 15 ml/min/1.73 sq.m. Results valid for patients 18 years and older.        GFR    Date Value Ref Range Status   02/10/2021 >60  Final     Calcium   Date Value Ref Range Status   02/10/2021 9.3 8.6 - 10.2 mg/dL Final     Total Protein   Date Value Ref Range Status   08/25/2020 6.7 6.4 - 8.3 g/dL Final     Albumin   Date Value Ref Range Status   08/25/2020 4.0 3.5 - 5.2 g/dL Final     Total Bilirubin   Date Value Ref Range Status   08/25/2020 <0.2 0.0 - 1.2 mg/dL Final     Alkaline Phosphatase   Date Value Ref Range Status   08/25/2020 81 35 - 104 U/L Final     ALT   Date Value Ref Range Status   08/25/2020 28 0 - 32 U/L Final     AST   Date Value Ref Range Status   08/25/2020 19 0 - 31 U/L Final       TSH  Lab Results   Component Value Date    TSH 2.420 11/10/2018       A1C  Lab Results   Component Value Date    LABA1C 6.8 02/10/2022    LABA1C 6.8 02/10/2022       LIPID  Lab Results   Component Value Date    CHOL 165 02/27/2019    TRIG 127 02/27/2019    HDL 32 02/27/2019    LDLCALC 108 (H) 02/27/2019    LABVLDL 25 02/27/2019     Results for POC orders placed in visit on 06/20/22   POCT microalbumin   Result Value Ref Range    Microalb, Ur 30mg     Creatinine Ur POCT 200mg     Microalbumin Creatinine Ratio <30    POCT Urinalysis no Micro   Result Value Ref Range    Color, UA yellow     Clarity, UA clear     Glucose, UA POC neg     Bilirubin, UA neg     Ketones, UA neg     Spec Grav, UA 1.025     Blood, UA POC neg     pH, UA 6.5     Protein, UA POC neg     Urobilinogen, UA 1.0     Leukocytes, UA neg     Nitrite, UA neg        An electronic signature was used to authenticate this note.     --Alexandra Hirsch, DO

## 2022-06-21 LAB
ALBUMIN SERPL-MCNC: 4.2 G/DL (ref 3.5–5.2)
ALP BLD-CCNC: 97 U/L (ref 35–104)
ALT SERPL-CCNC: 21 U/L (ref 0–32)
ANION GAP SERPL CALCULATED.3IONS-SCNC: 23 MMOL/L (ref 7–16)
AST SERPL-CCNC: 23 U/L (ref 0–31)
BILIRUB SERPL-MCNC: 0.2 MG/DL (ref 0–1.2)
BUN BLDV-MCNC: 12 MG/DL (ref 6–23)
CALCIUM SERPL-MCNC: 9.3 MG/DL (ref 8.6–10.2)
CHLORIDE BLD-SCNC: 103 MMOL/L (ref 98–107)
CHOLESTEROL, TOTAL: 133 MG/DL (ref 0–199)
CO2: 17 MMOL/L (ref 22–29)
CREAT SERPL-MCNC: 0.6 MG/DL (ref 0.5–1)
GFR AFRICAN AMERICAN: >60
GFR NON-AFRICAN AMERICAN: >60 ML/MIN/1.73
GLUCOSE FASTING: 149 MG/DL (ref 74–99)
HDLC SERPL-MCNC: 45 MG/DL
LDL CHOLESTEROL CALCULATED: 60 MG/DL (ref 0–99)
POTASSIUM SERPL-SCNC: 4.8 MMOL/L (ref 3.5–5)
SODIUM BLD-SCNC: 143 MMOL/L (ref 132–146)
TOTAL PROTEIN: 7.3 G/DL (ref 6.4–8.3)
TRIGL SERPL-MCNC: 140 MG/DL (ref 0–149)
VLDLC SERPL CALC-MCNC: 28 MG/DL

## 2022-06-30 PROBLEM — E03.9 ACQUIRED HYPOTHYROIDISM: Status: ACTIVE | Noted: 2022-06-30

## 2022-06-30 RX ORDER — LEVOTHYROXINE SODIUM 0.03 MG/1
25 TABLET ORAL DAILY
Qty: 90 TABLET | Refills: 0 | Status: SHIPPED
Start: 2022-06-30 | End: 2022-09-21

## 2022-07-29 RX ORDER — ATORVASTATIN CALCIUM 20 MG/1
TABLET, FILM COATED ORAL
Qty: 90 TABLET | Refills: 1 | Status: SHIPPED | OUTPATIENT
Start: 2022-07-29

## 2022-09-19 RX ORDER — TERBINAFINE HYDROCHLORIDE 250 MG/1
TABLET ORAL
Qty: 28 TABLET | Refills: 2 | OUTPATIENT
Start: 2022-09-19

## 2022-09-19 NOTE — TELEPHONE ENCOUNTER
Pc from pt states she has seen Dr Miriam Jackman. Pt also states she didn't request rx pharmacy sent over as a renewel.

## 2022-09-21 RX ORDER — LEVOTHYROXINE SODIUM 0.03 MG/1
TABLET ORAL
Qty: 30 TABLET | Refills: 2 | Status: SHIPPED | OUTPATIENT
Start: 2022-09-21

## 2022-10-06 ENCOUNTER — TELEPHONE (OUTPATIENT)
Dept: PRIMARY CARE CLINIC | Age: 67
End: 2022-10-06

## 2022-10-06 DIAGNOSIS — E11.9 TYPE 2 DIABETES MELLITUS WITHOUT COMPLICATION, WITHOUT LONG-TERM CURRENT USE OF INSULIN (HCC): Primary | ICD-10-CM

## 2022-10-06 DIAGNOSIS — E03.9 ACQUIRED HYPOTHYROIDISM: ICD-10-CM

## 2022-10-06 NOTE — TELEPHONE ENCOUNTER
Patient stopped in office, states she recently saw cancer Doctor Dr. Kailyn Freire. States her voice is getting deeper and the doctor examined her neck area and stated for her to reach out to you. Unsure if you want to recheck Thyroid?     Next appt for patient is 12/19/22    Please, advise

## 2022-10-06 NOTE — TELEPHONE ENCOUNTER
Please contact patient and let her know that lab orders have been printed. She can either obtain labs at the office or outpatient.

## 2022-10-07 NOTE — TELEPHONE ENCOUNTER
Patient advised lab orders are placed. Expected 10/20/2022. Patient will go to [address]. Advised orders are in system.

## 2022-10-13 ENCOUNTER — HOSPITAL ENCOUNTER (OUTPATIENT)
Age: 67
Discharge: HOME | End: 2022-10-13
Payer: COMMERCIAL

## 2022-10-13 DIAGNOSIS — E11.9 TYPE 2 DIABETES MELLITUS WITHOUT COMPLICATION, WITHOUT LONG-TERM CURRENT USE OF INSULIN (HCC): ICD-10-CM

## 2022-10-13 DIAGNOSIS — E03.9 ACQUIRED HYPOTHYROIDISM: ICD-10-CM

## 2022-10-13 LAB
ANION GAP SERPL CALCULATED.3IONS-SCNC: 9 MMOL/L (ref 7–16)
BUN BLDV-MCNC: 9 MG/DL (ref 6–23)
CALCIUM SERPL-MCNC: 8.9 MG/DL (ref 8.6–10.2)
CHLORIDE BLD-SCNC: 101 MMOL/L (ref 98–107)
CO2: 27 MMOL/L (ref 22–29)
CREAT SERPL-MCNC: 0.5 MG/DL (ref 0.5–1)
GFR AFRICAN AMERICAN: >60
GFR NON-AFRICAN AMERICAN: >60 ML/MIN/1.73
GLUCOSE BLD-MCNC: 177 MG/DL (ref 74–99)
HBA1C MFR BLD: 8.5 % (ref 4–5.6)
POTASSIUM SERPL-SCNC: 4.5 MMOL/L (ref 3.5–5)
SODIUM BLD-SCNC: 137 MMOL/L (ref 132–146)
TSH SERPL DL<=0.05 MIU/L-ACNC: 2.82 UIU/ML (ref 0.27–4.2)

## 2022-10-13 PROCEDURE — 80048 BASIC METABOLIC PNL TOTAL CA: CPT

## 2022-10-13 PROCEDURE — 83036 HEMOGLOBIN GLYCOSYLATED A1C: CPT

## 2022-10-13 PROCEDURE — 36415 COLL VENOUS BLD VENIPUNCTURE: CPT

## 2022-10-13 PROCEDURE — 84443 ASSAY THYROID STIM HORMONE: CPT

## 2022-12-13 ENCOUNTER — OFFICE VISIT (OUTPATIENT)
Dept: PRIMARY CARE CLINIC | Age: 67
End: 2022-12-13
Payer: MEDICARE

## 2022-12-13 VITALS
HEIGHT: 65 IN | BODY MASS INDEX: 28.41 KG/M2 | WEIGHT: 170.5 LBS | TEMPERATURE: 96.8 F | HEART RATE: 86 BPM | SYSTOLIC BLOOD PRESSURE: 129 MMHG | DIASTOLIC BLOOD PRESSURE: 78 MMHG | OXYGEN SATURATION: 96 %

## 2022-12-13 DIAGNOSIS — M41.9 SCOLIOSIS, UNSPECIFIED SCOLIOSIS TYPE, UNSPECIFIED SPINAL REGION: ICD-10-CM

## 2022-12-13 DIAGNOSIS — E55.9 VITAMIN D DEFICIENCY: Primary | ICD-10-CM

## 2022-12-13 DIAGNOSIS — E03.9 ACQUIRED HYPOTHYROIDISM: ICD-10-CM

## 2022-12-13 DIAGNOSIS — E11.9 TYPE 2 DIABETES MELLITUS WITHOUT COMPLICATION, WITHOUT LONG-TERM CURRENT USE OF INSULIN (HCC): ICD-10-CM

## 2022-12-13 PROBLEM — C50.411 MALIGNANT NEOPLASM OF UPPER-OUTER QUADRANT OF RIGHT FEMALE BREAST (HCC): Status: ACTIVE | Noted: 2022-10-04

## 2022-12-13 PROBLEM — M85.80 OTHER SPECIFIED DISORDERS OF BONE DENSITY AND STRUCTURE, UNSPECIFIED SITE: Status: ACTIVE | Noted: 2022-10-28

## 2022-12-13 PROCEDURE — 3052F HG A1C>EQUAL 8.0%<EQUAL 9.0%: CPT | Performed by: FAMILY MEDICINE

## 2022-12-13 PROCEDURE — 1123F ACP DISCUSS/DSCN MKR DOCD: CPT | Performed by: FAMILY MEDICINE

## 2022-12-13 PROCEDURE — 99214 OFFICE O/P EST MOD 30 MIN: CPT | Performed by: FAMILY MEDICINE

## 2022-12-13 PROCEDURE — G0009 ADMIN PNEUMOCOCCAL VACCINE: HCPCS | Performed by: FAMILY MEDICINE

## 2022-12-13 PROCEDURE — 90677 PCV20 VACCINE IM: CPT | Performed by: FAMILY MEDICINE

## 2022-12-13 RX ORDER — GABAPENTIN 300 MG/1
300 CAPSULE ORAL 2 TIMES DAILY
Qty: 180 CAPSULE | Refills: 1 | Status: SHIPPED | OUTPATIENT
Start: 2022-12-13 | End: 2023-06-11

## 2022-12-13 RX ORDER — LEVOTHYROXINE SODIUM 0.03 MG/1
TABLET ORAL
Qty: 90 TABLET | Refills: 1 | Status: SHIPPED | OUTPATIENT
Start: 2022-12-13

## 2022-12-13 ASSESSMENT — ENCOUNTER SYMPTOMS
NAUSEA: 0
CONSTIPATION: 0
VOMITING: 0
BACK PAIN: 1
SINUS PRESSURE: 0
BLOOD IN STOOL: 0
COLOR CHANGE: 0
ABDOMINAL DISTENTION: 0
SHORTNESS OF BREATH: 0
DIARRHEA: 0
EYE DISCHARGE: 0
FACIAL SWELLING: 0
PHOTOPHOBIA: 0
EYE ITCHING: 0
ABDOMINAL PAIN: 0
EYE PAIN: 0
WHEEZING: 0
SORE THROAT: 0
RHINORRHEA: 0
COUGH: 0

## 2022-12-13 NOTE — PROGRESS NOTES
Kassy Rashid (:  1955) is a 74 y.o. female,Established patient, here for evaluation of the following chief complaint(s):  Follow-up         ASSESSMENT/PLAN:  1. Vitamin D deficiency  -     CBC; Future  -     Comprehensive Metabolic Panel, Fasting; Future  -     Vitamin D 25 Hydroxy; Future  2. Acquired hypothyroidism  -     CBC; Future  -     Comprehensive Metabolic Panel, Fasting; Future  -     TSH; Future  3. Type 2 diabetes mellitus without complication, without long-term current use of insulin (HCC)  -     CBC; Future  -     Comprehensive Metabolic Panel, Fasting; Future  -     Lipid Panel; Future  -     Hemoglobin A1C; Future  4. Scoliosis, unspecified scoliosis type, unspecified spinal region      PLAN:  Prevnar 20 administered. Colonoscopy report from 3/1/2022 reviewed. Follow-up 5 years. Continue to follow with  regarding breast cancer. Labs drawn. UA/MA report from 2022 reviewed. Trial of gabapentin 300 mg at bedtime daily with consideration toward upward titration in the future. Return in 15 weeks (on 3/28/2023) for Follow up. Subjective   SUBJECTIVE/OBJECTIVE:  Patient is here for 6-month follow-up. She complains of back pain with radiation down both legs. Having difficult time ambulating. She does describe neuropathic discomfort in her lower extremities. Review of Systems   Constitutional:  Negative for chills, fatigue and fever. HENT:  Negative for congestion, ear discharge, ear pain, facial swelling, hearing loss, nosebleeds, rhinorrhea, sinus pressure and sore throat. Eyes:  Negative for photophobia, pain, discharge, itching and visual disturbance. Respiratory:  Negative for cough, shortness of breath and wheezing. Cardiovascular:  Negative for chest pain, palpitations and leg swelling. Gastrointestinal:  Negative for abdominal distention, abdominal pain, blood in stool, constipation, diarrhea, nausea and vomiting.    Endocrine: Negative for polydipsia, polyphagia and polyuria. Genitourinary:  Negative for difficulty urinating, dysuria, frequency, hematuria and urgency. Musculoskeletal:  Positive for arthralgias, back pain, gait problem and myalgias. Negative for joint swelling. Skin:  Negative for color change and rash. Complains of toenail fungus previously treated with Lamisil with some benefit. Allergic/Immunologic: Negative for environmental allergies and food allergies. Neurological:  Positive for numbness. Negative for dizziness, seizures, syncope, weakness and headaches. Psychiatric/Behavioral:  Negative for confusion, hallucinations and suicidal ideas. The patient is not nervous/anxious. Immunization History   Administered Date(s) Administered    COVID-19, MODERNA BLUE border, Primary or Immunocompromised, (age 12y+), IM, 100 mcg/0.5mL 02/06/2021, 03/06/2021, 08/31/2021, 04/07/2022    COVID-19, PFIZER Bivalent BOOSTER, DO NOT Dilute, (age 12y+), IM, 30 mcg/0.3 mL 12/01/2022    Influenza A ([id number]) Vaccine PF IM 11/10/2009    Influenza Virus Vaccine 09/02/2014, 09/01/2015, 09/19/2016, 09/01/2017, 03/11/2022    Influenza, FLUARIX, FLULAVAL, FLUZONE (age 10 mo+) AND AFLURIA, (age 1 y+), PF, 0.5mL 09/24/2018, 10/30/2020    Pneumococcal conjugate PCV20, PF (Prevnar 20) 12/13/2022    Td vaccine (adult) 04/12/2005    Tdap (Boostrix, Adacel) 09/07/2015    Zoster Recombinant (Shingrix) 08/31/2021, 12/04/2021         Objective   /78   Pulse 86   Temp 96.8 °F (36 °C) (Temporal)   Ht 5' 5\" (1.651 m)   Wt 170 lb 8 oz (77.3 kg)   SpO2 96%   BMI 28.37 kg/m²   Current Outpatient Medications   Medication Sig Dispense Refill    levothyroxine (SYNTHROID) 25 MCG tablet TAKE 1 TABLET BY MOUTH EVERY DAY 90 tablet 1    vitamin D (CHOLECALCIFEROL) 98296 UNIT CAPS Take 1 capsule by mouth once a week 12 capsule 3    gabapentin (NEURONTIN) 300 MG capsule Take 1 capsule by mouth 2 times daily for 180 days.  180 capsule 1 atorvastatin (LIPITOR) 20 MG tablet TAKE 1 TABLET BY MOUTH EVERY DAY 90 tablet 1    traMADol (ULTRAM) 50 MG tablet Take 50 mg by mouth every 6 hours as needed for Pain.      vitamin D (ERGOCALCIFEROL) 1.25 MG (17911 UT) CAPS capsule TAKE 1 CAPSULE WEEKLY 4 capsule 11    metFORMIN (GLUCOPHAGE) 500 MG tablet Take 2 tablets by mouth 2 times daily 360 tablet 3    blood glucose test strips (ONETOUCH ULTRA) strip CHECK ONCE DAILY 100 strip 3    anastrozole (ARIMIDEX) 1 MG tablet TAKE 1 TABLET BY MOUTH EVERY DAY      Naproxen Sodium (ALEVE PO) Take  by mouth as needed. No current facility-administered medications for this visit. Physical Exam  Vitals and nursing note reviewed. Constitutional:       General: She is not in acute distress. Appearance: Normal appearance. HENT:      Head: Normocephalic and atraumatic. Right Ear: Tympanic membrane, ear canal and external ear normal. There is no impacted cerumen. Left Ear: Tympanic membrane, ear canal and external ear normal. There is no impacted cerumen. Nose: Nose normal.      Mouth/Throat:      Mouth: Mucous membranes are moist.      Pharynx: No oropharyngeal exudate or posterior oropharyngeal erythema. Eyes:      General: No scleral icterus. Extraocular Movements: Extraocular movements intact. Conjunctiva/sclera: Conjunctivae normal.      Pupils: Pupils are equal, round, and reactive to light. Neck:      Vascular: No carotid bruit. Comments: Trachea midline. No JVD. No thyromegaly or nodules. Cardiovascular:      Rate and Rhythm: Normal rate and regular rhythm. Pulses: Normal pulses. Heart sounds: Normal heart sounds. No murmur heard. Pulmonary:      Effort: Pulmonary effort is normal. No respiratory distress. Breath sounds: Normal breath sounds. No wheezing, rhonchi or rales.    Abdominal:      General: Bowel sounds are normal.   Musculoskeletal:         General: Deformity (Evidence of moderate scoliosis.) present. No swelling or tenderness. Cervical back: Normal range of motion. Right lower leg: No edema. Left lower leg: No edema. Lymphadenopathy:      Cervical: No cervical adenopathy. Skin:     General: Skin is warm and dry. Neurological:      General: No focal deficit present. Mental Status: She is alert and oriented to person, place, and time. Cranial Nerves: No cranial nerve deficit. Psychiatric:         Mood and Affect: Mood normal.         Behavior: Behavior normal.         Thought Content:  Thought content normal.         Judgment: Judgment normal.          CBC  WBC   Date Value Ref Range Status   06/20/2022 7.9 4.5 - 11.5 E9/L Final     RBC   Date Value Ref Range Status   06/20/2022 4.44 3.50 - 5.50 E12/L Final     Hemoglobin   Date Value Ref Range Status   06/20/2022 14.0 11.5 - 15.5 g/dL Final     Hematocrit   Date Value Ref Range Status   06/20/2022 44.9 34.0 - 48.0 % Final     MCV   Date Value Ref Range Status   06/20/2022 101.1 (H) 80.0 - 99.9 fL Final     MCH   Date Value Ref Range Status   06/20/2022 31.5 26.0 - 35.0 pg Final     MCHC   Date Value Ref Range Status   06/20/2022 31.2 (L) 32.0 - 34.5 % Final     RDW   Date Value Ref Range Status   06/20/2022 12.9 11.5 - 15.0 fL Final     Platelets   Date Value Ref Range Status   06/20/2022 278 130 - 450 E9/L Final     MPV   Date Value Ref Range Status   06/20/2022 10.3 7.0 - 12.0 fL Final     Neutrophils %   Date Value Ref Range Status   08/25/2020 55.8 43.0 - 80.0 % Final     Immature Granulocytes #   Date Value Ref Range Status   08/25/2020 0.01 E9/L Final     Immature Granulocytes %   Date Value Ref Range Status   08/25/2020 0.1 0.0 - 5.0 % Final     Lymphocytes %   Date Value Ref Range Status   08/25/2020 36.5 20.0 - 42.0 % Final     Monocytes %   Date Value Ref Range Status   08/25/2020 5.2 2.0 - 12.0 % Final     Eosinophils %   Date Value Ref Range Status   08/25/2020 1.8 0.0 - 6.0 % Final     Basophils %   Date Value Ref Range Status   08/25/2020 0.6 0.0 - 2.0 % Final     Neutrophils Absolute   Date Value Ref Range Status   08/25/2020 4.00 1.80 - 7.30 E9/L Final     Lymphocytes Absolute   Date Value Ref Range Status   08/25/2020 2.62 1.50 - 4.00 E9/L Final     Monocytes Absolute   Date Value Ref Range Status   08/25/2020 0.37 0.10 - 0.95 E9/L Final     Eosinophils Absolute   Date Value Ref Range Status   08/25/2020 0.13 0.05 - 0.50 E9/L Final     Basophils Absolute   Date Value Ref Range Status   08/25/2020 0.04 0.00 - 0.20 E9/L Final       CMP  Sodium   Date Value Ref Range Status   10/13/2022 137 132 - 146 mmol/L Final     Potassium   Date Value Ref Range Status   10/13/2022 4.5 3.5 - 5.0 mmol/L Final     Chloride   Date Value Ref Range Status   10/13/2022 101 98 - 107 mmol/L Final     CO2   Date Value Ref Range Status   10/13/2022 27 22 - 29 mmol/L Final     Anion Gap   Date Value Ref Range Status   10/13/2022 9 7 - 16 mmol/L Final     Glucose   Date Value Ref Range Status   10/13/2022 177 (H) 74 - 99 mg/dL Final     BUN   Date Value Ref Range Status   10/13/2022 9 6 - 23 mg/dL Final     Creatinine   Date Value Ref Range Status   10/13/2022 0.5 0.5 - 1.0 mg/dL Final     GFR Non-   Date Value Ref Range Status   10/13/2022 >60 >=60 mL/min/1.73 Final     Comment:     Chronic Kidney Disease: less than 60 ml/min/1.73 sq.m. Kidney Failure: less than 15 ml/min/1.73 sq.m. Results valid for patients 18 years and older.        GFR    Date Value Ref Range Status   10/13/2022 >60  Final     Calcium   Date Value Ref Range Status   10/13/2022 8.9 8.6 - 10.2 mg/dL Final     Total Protein   Date Value Ref Range Status   06/20/2022 7.3 6.4 - 8.3 g/dL Final     Albumin   Date Value Ref Range Status   06/20/2022 4.2 3.5 - 5.2 g/dL Final     Total Bilirubin   Date Value Ref Range Status   06/20/2022 0.2 0.0 - 1.2 mg/dL Final     Alkaline Phosphatase   Date Value Ref Range Status   06/20/2022 97 35 - 104 U/L Final     ALT   Date Value Ref Range Status   06/20/2022 21 0 - 32 U/L Final     AST   Date Value Ref Range Status   06/20/2022 23 0 - 31 U/L Final       TSH  Lab Results   Component Value Date    TSH 2.820 10/13/2022       A1C  Lab Results   Component Value Date    LABA1C 8.5 (H) 10/13/2022       LIPID  Lab Results   Component Value Date    CHOL 133 06/20/2022    TRIG 140 06/20/2022    HDL 45 06/20/2022    LDLCALC 60 06/20/2022    LABVLDL 28 06/20/2022     No results found for this visit on 12/13/22. An electronic signature was used to authenticate this note.     --Nu Murry, DO

## 2023-01-18 RX ORDER — ATORVASTATIN CALCIUM 20 MG/1
TABLET, FILM COATED ORAL
Qty: 90 TABLET | Refills: 1 | Status: SHIPPED | OUTPATIENT
Start: 2023-01-18

## 2023-03-28 ENCOUNTER — OFFICE VISIT (OUTPATIENT)
Dept: PRIMARY CARE CLINIC | Age: 68
End: 2023-03-28

## 2023-03-28 VITALS
DIASTOLIC BLOOD PRESSURE: 78 MMHG | BODY MASS INDEX: 28.32 KG/M2 | HEIGHT: 65 IN | HEART RATE: 100 BPM | OXYGEN SATURATION: 96 % | WEIGHT: 170 LBS | SYSTOLIC BLOOD PRESSURE: 110 MMHG | TEMPERATURE: 97.3 F

## 2023-03-28 DIAGNOSIS — R05.3 CHRONIC COUGH: ICD-10-CM

## 2023-03-28 DIAGNOSIS — E55.9 VITAMIN D DEFICIENCY: ICD-10-CM

## 2023-03-28 DIAGNOSIS — E11.9 TYPE 2 DIABETES MELLITUS WITHOUT COMPLICATION, WITHOUT LONG-TERM CURRENT USE OF INSULIN (HCC): Primary | ICD-10-CM

## 2023-03-28 DIAGNOSIS — E03.9 ACQUIRED HYPOTHYROIDISM: ICD-10-CM

## 2023-03-28 DIAGNOSIS — E11.9 TYPE 2 DIABETES MELLITUS WITHOUT COMPLICATION, WITHOUT LONG-TERM CURRENT USE OF INSULIN (HCC): ICD-10-CM

## 2023-03-28 LAB
ALBUMIN SERPL-MCNC: 3.6 G/DL (ref 3.5–5.2)
ALP SERPL-CCNC: 110 U/L (ref 35–104)
ALT SERPL-CCNC: 21 U/L (ref 0–32)
ANION GAP SERPL CALCULATED.3IONS-SCNC: 13 MMOL/L (ref 7–16)
AST SERPL-CCNC: 20 U/L (ref 0–31)
BILIRUB SERPL-MCNC: 0.4 MG/DL (ref 0–1.2)
BUN SERPL-MCNC: 9 MG/DL (ref 6–23)
CALCIUM SERPL-MCNC: 9.4 MG/DL (ref 8.6–10.2)
CHLORIDE SERPL-SCNC: 97 MMOL/L (ref 98–107)
CHOLESTEROL, TOTAL: 120 MG/DL (ref 0–199)
CO2 SERPL-SCNC: 24 MMOL/L (ref 22–29)
CREAT SERPL-MCNC: 0.5 MG/DL (ref 0.5–1)
ERYTHROCYTE [DISTWIDTH] IN BLOOD BY AUTOMATED COUNT: 12.6 FL (ref 11.5–15)
GLUCOSE FASTING: 249 MG/DL (ref 74–99)
HBA1C MFR BLD: 10.3 % (ref 4–5.6)
HCT VFR BLD AUTO: 47.3 % (ref 34–48)
HDLC SERPL-MCNC: 35 MG/DL
HGB BLD-MCNC: 15 G/DL (ref 11.5–15.5)
LDLC SERPL CALC-MCNC: 63 MG/DL (ref 0–99)
MCH RBC QN AUTO: 31.1 PG (ref 26–35)
MCHC RBC AUTO-ENTMCNC: 31.7 % (ref 32–34.5)
MCV RBC AUTO: 98.1 FL (ref 80–99.9)
PLATELET # BLD AUTO: 249 E9/L (ref 130–450)
PMV BLD AUTO: 10.5 FL (ref 7–12)
POTASSIUM SERPL-SCNC: 4.7 MMOL/L (ref 3.5–5)
PROT SERPL-MCNC: 6.9 G/DL (ref 6.4–8.3)
RBC # BLD AUTO: 4.82 E12/L (ref 3.5–5.5)
SODIUM SERPL-SCNC: 134 MMOL/L (ref 132–146)
TRIGL SERPL-MCNC: 111 MG/DL (ref 0–149)
TSH SERPL-MCNC: 3.34 UIU/ML (ref 0.27–4.2)
VLDLC SERPL CALC-MCNC: 22 MG/DL
WBC # BLD: 6 E9/L (ref 4.5–11.5)

## 2023-03-28 SDOH — ECONOMIC STABILITY: INCOME INSECURITY: HOW HARD IS IT FOR YOU TO PAY FOR THE VERY BASICS LIKE FOOD, HOUSING, MEDICAL CARE, AND HEATING?: NOT HARD AT ALL

## 2023-03-28 SDOH — ECONOMIC STABILITY: HOUSING INSECURITY
IN THE LAST 12 MONTHS, WAS THERE A TIME WHEN YOU DID NOT HAVE A STEADY PLACE TO SLEEP OR SLEPT IN A SHELTER (INCLUDING NOW)?: NO

## 2023-03-28 SDOH — ECONOMIC STABILITY: FOOD INSECURITY: WITHIN THE PAST 12 MONTHS, THE FOOD YOU BOUGHT JUST DIDN'T LAST AND YOU DIDN'T HAVE MONEY TO GET MORE.: NEVER TRUE

## 2023-03-28 SDOH — ECONOMIC STABILITY: FOOD INSECURITY: WITHIN THE PAST 12 MONTHS, YOU WORRIED THAT YOUR FOOD WOULD RUN OUT BEFORE YOU GOT MONEY TO BUY MORE.: NEVER TRUE

## 2023-03-28 ASSESSMENT — PATIENT HEALTH QUESTIONNAIRE - PHQ9
4. FEELING TIRED OR HAVING LITTLE ENERGY: 0
SUM OF ALL RESPONSES TO PHQ QUESTIONS 1-9: 0
1. LITTLE INTEREST OR PLEASURE IN DOING THINGS: 0
SUM OF ALL RESPONSES TO PHQ9 QUESTIONS 1 & 2: 0
7. TROUBLE CONCENTRATING ON THINGS, SUCH AS READING THE NEWSPAPER OR WATCHING TELEVISION: 0
SUM OF ALL RESPONSES TO PHQ QUESTIONS 1-9: 0
6. FEELING BAD ABOUT YOURSELF - OR THAT YOU ARE A FAILURE OR HAVE LET YOURSELF OR YOUR FAMILY DOWN: 0
2. FEELING DOWN, DEPRESSED OR HOPELESS: 0
9. THOUGHTS THAT YOU WOULD BE BETTER OFF DEAD, OR OF HURTING YOURSELF: 0
3. TROUBLE FALLING OR STAYING ASLEEP: 0
8. MOVING OR SPEAKING SO SLOWLY THAT OTHER PEOPLE COULD HAVE NOTICED. OR THE OPPOSITE, BEING SO FIGETY OR RESTLESS THAT YOU HAVE BEEN MOVING AROUND A LOT MORE THAN USUAL: 0
5. POOR APPETITE OR OVEREATING: 0
10. IF YOU CHECKED OFF ANY PROBLEMS, HOW DIFFICULT HAVE THESE PROBLEMS MADE IT FOR YOU TO DO YOUR WORK, TAKE CARE OF THINGS AT HOME, OR GET ALONG WITH OTHER PEOPLE: 0

## 2023-03-28 ASSESSMENT — ENCOUNTER SYMPTOMS
ABDOMINAL PAIN: 0
SHORTNESS OF BREATH: 0
DIARRHEA: 0
FACIAL SWELLING: 0
EYE ITCHING: 0
SINUS PRESSURE: 0
CONSTIPATION: 0
PHOTOPHOBIA: 0
EYE PAIN: 0
BACK PAIN: 1
ABDOMINAL DISTENTION: 0
COLOR CHANGE: 0
VOMITING: 0
RHINORRHEA: 0
SORE THROAT: 0
WHEEZING: 0
NAUSEA: 0
BLOOD IN STOOL: 0
EYE DISCHARGE: 0

## 2023-03-28 NOTE — PROGRESS NOTES
Positive for cough. Negative for shortness of breath and wheezing. Cardiovascular:  Negative for chest pain, palpitations and leg swelling. Gastrointestinal:  Negative for abdominal distention, abdominal pain, blood in stool, constipation, diarrhea, nausea and vomiting. Endocrine: Negative for polydipsia, polyphagia and polyuria. Genitourinary:  Negative for difficulty urinating, dysuria, frequency, hematuria and urgency. Musculoskeletal:  Positive for arthralgias, back pain, gait problem and myalgias. Negative for joint swelling. Skin:  Negative for color change and rash. Allergic/Immunologic: Negative for environmental allergies and food allergies. Neurological:  Positive for numbness. Negative for dizziness, seizures, syncope, weakness and headaches. Psychiatric/Behavioral:  Negative for confusion, hallucinations and suicidal ideas. The patient is not nervous/anxious.        Immunization History   Administered Date(s) Administered    COVID-19, MODERNA BLUE border, Primary or Immunocompromised, (age 12y+), IM, 100 mcg/0.5mL 02/06/2021, 03/06/2021, 08/31/2021, 04/07/2022    COVID-19, PFIZER Bivalent BOOSTER, DO NOT Dilute, (age 12y+), IM, 30 mcg/0.3 mL 12/01/2022    Influenza A ([id number]) Vaccine PF IM 11/10/2009    Influenza Virus Vaccine 09/02/2014, 09/01/2015, 09/19/2016, 09/01/2017, 03/11/2022    Influenza, FLUARIX, FLULAVAL, FLUZONE (age 10 mo+) AND AFLURIA, (age 1 y+), PF, 0.5mL 09/24/2018, 10/30/2020    Pneumococcal, PCV20, PREVNAR 20, (age 18y+), IM, 0.5mL 12/13/2022    TDaP, ADACEL (age 10y-63y), BOOSTRIX (age 10y+), IM, 0.5mL 09/07/2015    Td vaccine (adult) 04/12/2005    Zoster Recombinant (Shingrix) 08/31/2021, 12/04/2021         Objective   /78 (Position: Sitting)   Pulse 100   Temp 97.3 °F (36.3 °C) (Temporal)   Ht 5' 5\" (1.651 m)   Wt 170 lb (77.1 kg)   SpO2 96%   BMI 28.29 kg/m²   Current Outpatient Medications   Medication Sig Dispense Refill    atorvastatin (LIPITOR)

## 2023-03-29 LAB — VITAMIN D 25-HYDROXY: 36 NG/ML (ref 30–100)

## 2023-03-30 RX ORDER — GLIPIZIDE 5 MG/1
5 TABLET, FILM COATED, EXTENDED RELEASE ORAL
Qty: 90 TABLET | Refills: 0 | Status: SHIPPED | OUTPATIENT
Start: 2023-03-30

## 2023-03-31 ASSESSMENT — ENCOUNTER SYMPTOMS: COUGH: 1

## 2023-04-03 ENCOUNTER — TELEPHONE (OUTPATIENT)
Dept: PRIMARY CARE CLINIC | Age: 68
End: 2023-04-03

## 2023-04-03 NOTE — TELEPHONE ENCOUNTER
----- Message from Jonathan Castro DO sent at 4/3/2023  6:20 AM EDT -----  Regarding: CXR Results  Please contact patient. Chest x-ray 3/29/2023 reviewed. Results are normal.  No abnormality seen on CXR.

## 2023-05-05 RX ORDER — ERGOCALCIFEROL 1.25 MG/1
CAPSULE ORAL
Qty: 12 CAPSULE | Refills: 3 | OUTPATIENT
Start: 2023-05-05

## 2023-05-09 LAB
ALANINE AMINOTRANSFERASE (SGPT) (U/L) IN SER/PLAS: 17 U/L (ref 7–45)
ALBUMIN (G/DL) IN SER/PLAS: 4 G/DL (ref 3.4–5)
ALKALINE PHOSPHATASE (U/L) IN SER/PLAS: 99 U/L (ref 33–136)
ANION GAP IN SER/PLAS: 11 MMOL/L (ref 10–20)
ASPARTATE AMINOTRANSFERASE (SGOT) (U/L) IN SER/PLAS: 13 U/L (ref 9–39)
BASOPHILS (10*3/UL) IN BLOOD BY AUTOMATED COUNT: 0.02 X10E9/L (ref 0–0.1)
BASOPHILS/100 LEUKOCYTES IN BLOOD BY AUTOMATED COUNT: 0.3 % (ref 0–2)
BILIRUBIN TOTAL (MG/DL) IN SER/PLAS: 0.3 MG/DL (ref 0–1.2)
CALCIUM (MG/DL) IN SER/PLAS: 9.7 MG/DL (ref 8.6–10.6)
CARBON DIOXIDE, TOTAL (MMOL/L) IN SER/PLAS: 31 MMOL/L (ref 21–32)
CHLORIDE (MMOL/L) IN SER/PLAS: 102 MMOL/L (ref 98–107)
CREATININE (MG/DL) IN SER/PLAS: 0.68 MG/DL (ref 0.5–1.05)
EOSINOPHILS (10*3/UL) IN BLOOD BY AUTOMATED COUNT: 0.17 X10E9/L (ref 0–0.7)
EOSINOPHILS/100 LEUKOCYTES IN BLOOD BY AUTOMATED COUNT: 2.2 % (ref 0–6)
ERYTHROCYTE DISTRIBUTION WIDTH (RATIO) BY AUTOMATED COUNT: 12.5 % (ref 11.5–14.5)
ERYTHROCYTE MEAN CORPUSCULAR HEMOGLOBIN CONCENTRATION (G/DL) BY AUTOMATED: 31.9 G/DL (ref 32–36)
ERYTHROCYTE MEAN CORPUSCULAR VOLUME (FL) BY AUTOMATED COUNT: 98 FL (ref 80–100)
ERYTHROCYTES (10*6/UL) IN BLOOD BY AUTOMATED COUNT: 4.62 X10E12/L (ref 4–5.2)
GFR FEMALE: >90 ML/MIN/1.73M2
GLUCOSE (MG/DL) IN SER/PLAS: 164 MG/DL (ref 74–99)
HEMATOCRIT (%) IN BLOOD BY AUTOMATED COUNT: 45.1 % (ref 36–46)
HEMOGLOBIN (G/DL) IN BLOOD: 14.4 G/DL (ref 12–16)
IMMATURE GRANULOCYTES/100 LEUKOCYTES IN BLOOD BY AUTOMATED COUNT: 0.1 % (ref 0–0.9)
LEUKOCYTES (10*3/UL) IN BLOOD BY AUTOMATED COUNT: 7.7 X10E9/L (ref 4.4–11.3)
LYMPHOCYTES (10*3/UL) IN BLOOD BY AUTOMATED COUNT: 2.49 X10E9/L (ref 1.2–4.8)
LYMPHOCYTES/100 LEUKOCYTES IN BLOOD BY AUTOMATED COUNT: 32.5 % (ref 13–44)
MONOCYTES (10*3/UL) IN BLOOD BY AUTOMATED COUNT: 0.41 X10E9/L (ref 0.1–1)
MONOCYTES/100 LEUKOCYTES IN BLOOD BY AUTOMATED COUNT: 5.4 % (ref 2–10)
NEUTROPHILS (10*3/UL) IN BLOOD BY AUTOMATED COUNT: 4.55 X10E9/L (ref 1.2–7.7)
NEUTROPHILS/100 LEUKOCYTES IN BLOOD BY AUTOMATED COUNT: 59.5 % (ref 40–80)
PLATELETS (10*3/UL) IN BLOOD AUTOMATED COUNT: 239 X10E9/L (ref 150–450)
POTASSIUM (MMOL/L) IN SER/PLAS: 4.3 MMOL/L (ref 3.5–5.3)
PROTEIN TOTAL: 6.9 G/DL (ref 6.4–8.2)
SODIUM (MMOL/L) IN SER/PLAS: 140 MMOL/L (ref 136–145)
UREA NITROGEN (MG/DL) IN SER/PLAS: 13 MG/DL (ref 6–23)

## 2023-06-23 RX ORDER — ATORVASTATIN CALCIUM 20 MG/1
TABLET, FILM COATED ORAL
Qty: 90 TABLET | Refills: 1 | Status: SHIPPED | OUTPATIENT
Start: 2023-06-23

## 2023-06-23 RX ORDER — LEVOTHYROXINE SODIUM 0.03 MG/1
TABLET ORAL
Qty: 90 TABLET | Refills: 1 | Status: SHIPPED | OUTPATIENT
Start: 2023-06-23

## 2023-06-23 NOTE — TELEPHONE ENCOUNTER
Requested Prescriptions     Pending Prescriptions Disp Refills    levothyroxine (SYNTHROID) 25 MCG tablet [Pharmacy Med Name: LEVOTHYROXINE 25 MCG TABLET] 90 tablet 1     Sig: TAKE 1 TABLET BY MOUTH EVERY DAY    atorvastatin (LIPITOR) 20 MG tablet [Pharmacy Med Name: ATORVASTATIN 20 MG TABLET] 90 tablet 1     Sig: TAKE 1 TABLET BY MOUTH EVERY DAY       Next appt is 10/3/2023  Last appt was 3/28/2023

## 2023-06-27 RX ORDER — GLIPIZIDE 5 MG/1
TABLET, FILM COATED, EXTENDED RELEASE ORAL
Qty: 90 TABLET | Refills: 0 | Status: SHIPPED | OUTPATIENT
Start: 2023-06-27

## 2023-09-02 PROBLEM — I89.0 LYMPHEDEMA: Status: ACTIVE | Noted: 2023-09-02

## 2023-09-02 PROBLEM — M25.519 SHOULDER PAIN: Status: ACTIVE | Noted: 2023-09-02

## 2023-09-02 PROBLEM — M54.50 LOW BACK PAIN: Status: ACTIVE | Noted: 2023-09-02

## 2023-09-02 PROBLEM — C50.411 MALIGNANT NEOPLASM OF UPPER-OUTER QUADRANT OF RIGHT FEMALE BREAST (MULTI): Status: ACTIVE | Noted: 2023-09-02

## 2023-09-02 PROBLEM — M79.606 LEG PAIN: Status: ACTIVE | Noted: 2023-09-02

## 2023-09-02 PROBLEM — L03.113 CELLULITIS OF RIGHT UPPER EXTREMITY: Status: ACTIVE | Noted: 2023-09-02

## 2023-09-02 PROBLEM — R59.0 AXILLARY ADENOPATHY: Status: ACTIVE | Noted: 2023-09-02

## 2023-09-02 PROBLEM — C50.911 CARCINOMA OF RIGHT BREAST METASTATIC TO AXILLARY LYMPH NODE (MULTI): Status: ACTIVE | Noted: 2023-09-02

## 2023-09-02 PROBLEM — C77.3 CARCINOMA OF RIGHT BREAST METASTATIC TO AXILLARY LYMPH NODE (MULTI): Status: ACTIVE | Noted: 2023-09-02

## 2023-09-02 PROBLEM — M75.01 ADHESIVE CAPSULITIS OF RIGHT SHOULDER: Status: ACTIVE | Noted: 2023-09-02

## 2023-09-02 PROBLEM — M54.17 LUMBOSACRAL NEURITIS: Status: ACTIVE | Noted: 2023-09-02

## 2023-09-02 PROBLEM — M41.9 SCOLIOSIS: Status: ACTIVE | Noted: 2023-09-02

## 2023-09-02 RX ORDER — ATORVASTATIN CALCIUM 10 MG/1
1 TABLET, FILM COATED ORAL DAILY
COMMUNITY
Start: 2021-08-30

## 2023-09-02 RX ORDER — LEVOTHYROXINE SODIUM 25 UG/1
1 TABLET ORAL DAILY
COMMUNITY
Start: 2022-09-21

## 2023-09-02 RX ORDER — GABAPENTIN 600 MG/1
TABLET, FILM COATED ORAL
COMMUNITY
Start: 2019-12-23

## 2023-09-02 RX ORDER — AMMONIUM LACTATE 12 G/100G
LOTION TOPICAL
COMMUNITY
Start: 2022-07-08

## 2023-09-02 RX ORDER — BLOOD SUGAR DIAGNOSTIC
STRIP MISCELLANEOUS DAILY
COMMUNITY
Start: 2020-07-07

## 2023-09-02 RX ORDER — ANASTROZOLE 1 MG/1
TABLET ORAL
COMMUNITY
End: 2023-10-10 | Stop reason: SDUPTHER

## 2023-09-02 RX ORDER — METFORMIN HYDROCHLORIDE 1000 MG/1
TABLET ORAL
COMMUNITY

## 2023-09-02 RX ORDER — TRAMADOL HYDROCHLORIDE 50 MG/1
1-2 TABLET ORAL 4 TIMES DAILY PRN
COMMUNITY
Start: 2015-08-10

## 2023-09-02 RX ORDER — ATORVASTATIN CALCIUM 20 MG/1
1 TABLET, FILM COATED ORAL DAILY
COMMUNITY
Start: 2022-02-25

## 2023-09-20 LAB — DIABETIC RETINOPATHY: NEGATIVE

## 2023-09-25 RX ORDER — GLIPIZIDE 5 MG/1
TABLET, FILM COATED, EXTENDED RELEASE ORAL
Qty: 90 TABLET | Refills: 0 | Status: SHIPPED | OUTPATIENT
Start: 2023-09-25

## 2023-10-04 ENCOUNTER — OFFICE VISIT (OUTPATIENT)
Dept: PRIMARY CARE CLINIC | Age: 68
End: 2023-10-04
Payer: MEDICARE

## 2023-10-04 VITALS
HEART RATE: 86 BPM | WEIGHT: 170 LBS | SYSTOLIC BLOOD PRESSURE: 124 MMHG | BODY MASS INDEX: 28.32 KG/M2 | HEIGHT: 65 IN | DIASTOLIC BLOOD PRESSURE: 74 MMHG | TEMPERATURE: 98 F | OXYGEN SATURATION: 98 %

## 2023-10-04 DIAGNOSIS — E03.9 ACQUIRED HYPOTHYROIDISM: ICD-10-CM

## 2023-10-04 DIAGNOSIS — Z00.00 MEDICARE ANNUAL WELLNESS VISIT, INITIAL: Primary | ICD-10-CM

## 2023-10-04 DIAGNOSIS — E55.9 VITAMIN D DEFICIENCY: ICD-10-CM

## 2023-10-04 DIAGNOSIS — M41.9 SCOLIOSIS, UNSPECIFIED SCOLIOSIS TYPE, UNSPECIFIED SPINAL REGION: ICD-10-CM

## 2023-10-04 DIAGNOSIS — E11.9 TYPE 2 DIABETES MELLITUS WITHOUT COMPLICATION, WITHOUT LONG-TERM CURRENT USE OF INSULIN (HCC): ICD-10-CM

## 2023-10-04 DIAGNOSIS — M53.9 MULTILEVEL DEGENERATIVE DISC DISEASE: ICD-10-CM

## 2023-10-04 LAB
ALBUMIN SERPL-MCNC: 3.9 G/DL (ref 3.5–5.2)
ALP BLD-CCNC: 91 U/L (ref 35–104)
ALT SERPL-CCNC: 18 U/L (ref 0–32)
ANION GAP SERPL CALCULATED.3IONS-SCNC: 20 MMOL/L (ref 7–16)
AST SERPL-CCNC: 18 U/L (ref 0–31)
BILIRUB SERPL-MCNC: 0.3 MG/DL (ref 0–1.2)
BILIRUBIN, POC: NORMAL
BLOOD URINE, POC: NORMAL
BUN BLDV-MCNC: 10 MG/DL (ref 6–23)
CALCIUM SERPL-MCNC: 9.3 MG/DL (ref 8.6–10.2)
CHLORIDE BLD-SCNC: 99 MMOL/L (ref 98–107)
CHOLESTEROL: 110 MG/DL
CLARITY, POC: NORMAL
CO2: 19 MMOL/L (ref 22–29)
COLOR, POC: NORMAL
CREAT SERPL-MCNC: 0.6 MG/DL (ref 0.5–1)
CREATININE URINE POCT: NORMAL
GFR SERPL CREATININE-BSD FRML MDRD: >60 ML/MIN/1.73M2
GLUCOSE FASTING: 138 MG/DL (ref 74–99)
GLUCOSE URINE, POC: NORMAL
HBA1C MFR BLD: 8.8 %
HCT VFR BLD CALC: 43.3 % (ref 34–48)
HDLC SERPL-MCNC: 34 MG/DL
HEMOGLOBIN: 13.5 G/DL (ref 11.5–15.5)
KETONES, POC: NORMAL
LDL CHOLESTEROL: 63 MG/DL
LEUKOCYTE EST, POC: NORMAL
MCH RBC QN AUTO: 31.5 PG (ref 26–35)
MCHC RBC AUTO-ENTMCNC: 31.2 G/DL (ref 32–34.5)
MCV RBC AUTO: 101.2 FL (ref 80–99.9)
MICROALBUMIN/CREAT 24H UR: NORMAL MG/G{CREAT}
MICROALBUMIN/CREAT UR-RTO: NORMAL
NITRITE, POC: NORMAL
PDW BLD-RTO: 12.9 % (ref 11.5–15)
PH, POC: 7
PLATELET # BLD: 244 K/UL (ref 130–450)
PMV BLD AUTO: 10.3 FL (ref 7–12)
POTASSIUM SERPL-SCNC: 4.9 MMOL/L (ref 3.5–5)
PROTEIN, POC: NORMAL
RBC # BLD: 4.28 M/UL (ref 3.5–5.5)
SODIUM BLD-SCNC: 138 MMOL/L (ref 132–146)
SPECIFIC GRAVITY, POC: 1.01
TOTAL PROTEIN: 6.9 G/DL (ref 6.4–8.3)
TRIGL SERPL-MCNC: 66 MG/DL
TSH SERPL DL<=0.05 MIU/L-ACNC: 2.33 UIU/ML (ref 0.27–4.2)
UROBILINOGEN, POC: 1
VITAMIN D 25-HYDROXY: 42.9 NG/ML (ref 30–100)
VLDLC SERPL CALC-MCNC: 13 MG/DL
WBC # BLD: 7.2 K/UL (ref 4.5–11.5)

## 2023-10-04 PROCEDURE — G0008 ADMIN INFLUENZA VIRUS VAC: HCPCS | Performed by: FAMILY MEDICINE

## 2023-10-04 PROCEDURE — G0402 INITIAL PREVENTIVE EXAM: HCPCS | Performed by: FAMILY MEDICINE

## 2023-10-04 PROCEDURE — 90694 VACC AIIV4 NO PRSRV 0.5ML IM: CPT | Performed by: FAMILY MEDICINE

## 2023-10-04 PROCEDURE — 83036 HEMOGLOBIN GLYCOSYLATED A1C: CPT | Performed by: FAMILY MEDICINE

## 2023-10-04 PROCEDURE — 81002 URINALYSIS NONAUTO W/O SCOPE: CPT | Performed by: FAMILY MEDICINE

## 2023-10-04 PROCEDURE — 82044 UR ALBUMIN SEMIQUANTITATIVE: CPT | Performed by: FAMILY MEDICINE

## 2023-10-04 PROCEDURE — 3052F HG A1C>EQUAL 8.0%<EQUAL 9.0%: CPT | Performed by: FAMILY MEDICINE

## 2023-10-04 PROCEDURE — 36415 COLL VENOUS BLD VENIPUNCTURE: CPT | Performed by: FAMILY MEDICINE

## 2023-10-04 PROCEDURE — 1123F ACP DISCUSS/DSCN MKR DOCD: CPT | Performed by: FAMILY MEDICINE

## 2023-10-04 ASSESSMENT — PATIENT HEALTH QUESTIONNAIRE - PHQ9
3. TROUBLE FALLING OR STAYING ASLEEP: 0
8. MOVING OR SPEAKING SO SLOWLY THAT OTHER PEOPLE COULD HAVE NOTICED. OR THE OPPOSITE, BEING SO FIGETY OR RESTLESS THAT YOU HAVE BEEN MOVING AROUND A LOT MORE THAN USUAL: 0
SUM OF ALL RESPONSES TO PHQ9 QUESTIONS 1 & 2: 0
10. IF YOU CHECKED OFF ANY PROBLEMS, HOW DIFFICULT HAVE THESE PROBLEMS MADE IT FOR YOU TO DO YOUR WORK, TAKE CARE OF THINGS AT HOME, OR GET ALONG WITH OTHER PEOPLE: 0
SUM OF ALL RESPONSES TO PHQ QUESTIONS 1-9: 0
SUM OF ALL RESPONSES TO PHQ QUESTIONS 1-9: 0
6. FEELING BAD ABOUT YOURSELF - OR THAT YOU ARE A FAILURE OR HAVE LET YOURSELF OR YOUR FAMILY DOWN: 0
2. FEELING DOWN, DEPRESSED OR HOPELESS: 0
1. LITTLE INTEREST OR PLEASURE IN DOING THINGS: 0
SUM OF ALL RESPONSES TO PHQ QUESTIONS 1-9: 0
9. THOUGHTS THAT YOU WOULD BE BETTER OFF DEAD, OR OF HURTING YOURSELF: 0
4. FEELING TIRED OR HAVING LITTLE ENERGY: 0
7. TROUBLE CONCENTRATING ON THINGS, SUCH AS READING THE NEWSPAPER OR WATCHING TELEVISION: 0
SUM OF ALL RESPONSES TO PHQ QUESTIONS 1-9: 0
5. POOR APPETITE OR OVEREATING: 0

## 2023-10-04 NOTE — PROGRESS NOTES
Venipuncture was obtained from left arm. Patient tolerated the procedure without complications or complaints.
normal  ENT: tympanic membrane, external ear and ear canal normal bilaterally, oropharynx clear and moist with normal mucous membranes  Neck: neck supple and non tender without mass, no thyromegaly or thyroid nodules, no cervical lymphadenopathy   Pulmonary/Chest: clear to auscultation bilaterally- no wheezes, rales or rhonchi, normal air movement, no respiratory distress  Cardiovascular: normal rate, regular rhythm, normal S1 and S2, no murmurs, rubs, clicks or gallops, distal pulses intact, no carotid bruits  Abdomen: soft, non-tender, non-distended, normal bowel sounds, no masses or organomegaly  Extremities: no cyanosis, clubbing or edema  Neurologic: gait and coordination normal and speech normal   Musculoskeletal: There is evidence of moderate scoliosis and scar consistent with surgical history. No Known Allergies  Prior to Visit Medications    Medication Sig Taking? Authorizing Provider   Semaglutide,0.25 or 0.5MG/DOS, 2 MG/1.5ML SOPN 0.25 mg subcu weekly x4 weeks, then increase to 0.5 mg SQ weekly thereafter. Yes Megha Rooney DO   glipiZIDE (GLUCOTROL XL) 5 MG extended release tablet TAKE 1 TABLET BY MOUTH EVERY DAY BEFORE BREAKFAST Yes Megha Rooney DO   levothyroxine (SYNTHROID) 25 MCG tablet TAKE 1 TABLET BY MOUTH EVERY DAY Yes Megha Rooney DO   atorvastatin (LIPITOR) 20 MG tablet TAKE 1 TABLET BY MOUTH EVERY DAY Yes Megha Rooney DO   metFORMIN (GLUCOPHAGE) 500 MG tablet TAKE 2 TABLETS BY MOUTH TWICE A DAY Yes Megha Rooney DO   vitamin D (CHOLECALCIFEROL) 32939 UNIT CAPS Take 1 capsule by mouth once a week Yes Megha Rooney DO   gabapentin (NEURONTIN) 300 MG capsule Take 1 capsule by mouth 2 times daily for 180 days. Yes Megha Rooney DO   traMADol (ULTRAM) 50 MG tablet Take 1 tablet by mouth every 6 hours as needed for Pain.  Yes Provider, MD Isaac   blood glucose test strips (ONETOUCH ULTRA) strip CHECK ONCE DAILY Yes Megha Rooney DO   anastrozole (ARIMIDEX) 1 MG

## 2023-10-10 ENCOUNTER — ANCILLARY PROCEDURE (OUTPATIENT)
Dept: RADIOLOGY | Facility: CLINIC | Age: 68
End: 2023-10-10
Payer: MEDICARE

## 2023-10-10 ENCOUNTER — OFFICE VISIT (OUTPATIENT)
Dept: HEMATOLOGY/ONCOLOGY | Facility: CLINIC | Age: 68
End: 2023-10-10
Payer: MEDICARE

## 2023-10-10 VITALS
HEIGHT: 66 IN | HEART RATE: 89 BPM | SYSTOLIC BLOOD PRESSURE: 113 MMHG | OXYGEN SATURATION: 96 % | DIASTOLIC BLOOD PRESSURE: 69 MMHG | WEIGHT: 171.6 LBS | TEMPERATURE: 97.2 F | BODY MASS INDEX: 27.58 KG/M2

## 2023-10-10 VITALS — HEIGHT: 65 IN | BODY MASS INDEX: 28.69 KG/M2 | WEIGHT: 172.18 LBS

## 2023-10-10 DIAGNOSIS — Z17.0 MALIGNANT NEOPLASM OF UPPER-OUTER QUADRANT OF RIGHT BREAST IN FEMALE, ESTROGEN RECEPTOR POSITIVE (MULTI): Primary | ICD-10-CM

## 2023-10-10 DIAGNOSIS — Z79.811 ENCOUNTER FOR MONITORING ANASTROZOLE THERAPY: ICD-10-CM

## 2023-10-10 DIAGNOSIS — I89.0 LYMPHEDEMA: ICD-10-CM

## 2023-10-10 DIAGNOSIS — C50.919 MALIGNANT NEOPLASM OF UNSPECIFIED SITE OF UNSPECIFIED FEMALE BREAST (MULTI): ICD-10-CM

## 2023-10-10 DIAGNOSIS — Z00.00 ROUTINE HEALTH MAINTENANCE: ICD-10-CM

## 2023-10-10 DIAGNOSIS — M85.80 OSTEOPENIA, UNSPECIFIED LOCATION: ICD-10-CM

## 2023-10-10 DIAGNOSIS — M85.80 OTHER SPECIFIED DISORDERS OF BONE DENSITY AND STRUCTURE, UNSPECIFIED SITE: ICD-10-CM

## 2023-10-10 DIAGNOSIS — C50.411 MALIGNANT NEOPLASM OF UPPER-OUTER QUADRANT OF RIGHT BREAST IN FEMALE, ESTROGEN RECEPTOR POSITIVE (MULTI): Primary | ICD-10-CM

## 2023-10-10 DIAGNOSIS — Z51.81 ENCOUNTER FOR MONITORING ANASTROZOLE THERAPY: ICD-10-CM

## 2023-10-10 DIAGNOSIS — Z78.0 MENOPAUSE: ICD-10-CM

## 2023-10-10 PROBLEM — Z92.3 PERSONAL HISTORY OF IRRADIATION: Status: ACTIVE | Noted: 2023-10-10

## 2023-10-10 LAB — MAMMOGRAPHY, EXTERNAL: NORMAL

## 2023-10-10 PROCEDURE — 99214 OFFICE O/P EST MOD 30 MIN: CPT | Performed by: NURSE PRACTITIONER

## 2023-10-10 PROCEDURE — 1159F MED LIST DOCD IN RCRD: CPT | Performed by: NURSE PRACTITIONER

## 2023-10-10 PROCEDURE — 77067 SCR MAMMO BI INCL CAD: CPT | Mod: 50

## 2023-10-10 PROCEDURE — 77067 SCR MAMMO BI INCL CAD: CPT | Mod: BILATERAL PROCEDURE | Performed by: RADIOLOGY

## 2023-10-10 PROCEDURE — 1036F TOBACCO NON-USER: CPT | Performed by: NURSE PRACTITIONER

## 2023-10-10 PROCEDURE — 77063 BREAST TOMOSYNTHESIS BI: CPT | Mod: BILATERAL PROCEDURE | Performed by: RADIOLOGY

## 2023-10-10 PROCEDURE — 1126F AMNT PAIN NOTED NONE PRSNT: CPT | Performed by: NURSE PRACTITIONER

## 2023-10-10 RX ORDER — ANASTROZOLE 1 MG/1
1 TABLET ORAL DAILY
Qty: 90 TABLET | Refills: 3 | Status: SHIPPED | OUTPATIENT
Start: 2023-10-10 | End: 2024-10-09

## 2023-10-10 RX ORDER — DIPHENHYDRAMINE HYDROCHLORIDE 50 MG/ML
50 INJECTION INTRAMUSCULAR; INTRAVENOUS AS NEEDED
Status: CANCELLED | OUTPATIENT
Start: 2023-11-06

## 2023-10-10 RX ORDER — ZOLEDRONIC ACID 0.04 MG/ML
4 INJECTION, SOLUTION INTRAVENOUS ONCE
Status: CANCELLED | OUTPATIENT
Start: 2023-11-06

## 2023-10-10 RX ORDER — HEPARIN SODIUM,PORCINE/PF 10 UNIT/ML
50 SYRINGE (ML) INTRAVENOUS AS NEEDED
Status: CANCELLED | OUTPATIENT
Start: 2023-10-10

## 2023-10-10 RX ORDER — HEPARIN 100 UNIT/ML
500 SYRINGE INTRAVENOUS AS NEEDED
Status: CANCELLED | OUTPATIENT
Start: 2023-10-10

## 2023-10-10 RX ORDER — EPINEPHRINE 0.3 MG/.3ML
0.3 INJECTION SUBCUTANEOUS EVERY 5 MIN PRN
Status: CANCELLED | OUTPATIENT
Start: 2023-11-06

## 2023-10-10 RX ORDER — ALBUTEROL SULFATE 0.83 MG/ML
3 SOLUTION RESPIRATORY (INHALATION) AS NEEDED
Status: CANCELLED | OUTPATIENT
Start: 2023-11-06

## 2023-10-10 RX ORDER — METHYLPREDNISOLONE SODIUM SUCCINATE 40 MG/ML
40 INJECTION INTRAMUSCULAR; INTRAVENOUS AS NEEDED
Status: CANCELLED | OUTPATIENT
Start: 2023-11-06

## 2023-10-10 RX ORDER — FAMOTIDINE 10 MG/ML
20 INJECTION INTRAVENOUS ONCE AS NEEDED
Status: CANCELLED | OUTPATIENT
Start: 2023-11-06

## 2023-10-10 ASSESSMENT — ENCOUNTER SYMPTOMS
LOSS OF SENSATION IN FEET: 0
OCCASIONAL FEELINGS OF UNSTEADINESS: 0
DEPRESSION: 0

## 2023-10-10 ASSESSMENT — PATIENT HEALTH QUESTIONNAIRE - PHQ9
2. FEELING DOWN, DEPRESSED OR HOPELESS: NOT AT ALL
1. LITTLE INTEREST OR PLEASURE IN DOING THINGS: NOT AT ALL
SUM OF ALL RESPONSES TO PHQ9 QUESTIONS 1 AND 2: 0

## 2023-10-10 ASSESSMENT — COLUMBIA-SUICIDE SEVERITY RATING SCALE - C-SSRS
2. HAVE YOU ACTUALLY HAD ANY THOUGHTS OF KILLING YOURSELF?: NO
6. HAVE YOU EVER DONE ANYTHING, STARTED TO DO ANYTHING, OR PREPARED TO DO ANYTHING TO END YOUR LIFE?: NO
1. IN THE PAST MONTH, HAVE YOU WISHED YOU WERE DEAD OR WISHED YOU COULD GO TO SLEEP AND NOT WAKE UP?: NO

## 2023-10-10 ASSESSMENT — PAIN SCALES - GENERAL: PAINLEVEL: 0-NO PAIN

## 2023-10-10 NOTE — ASSESSMENT & PLAN NOTE
[id number] ER+/SC+/HER2- breast cancer luminal A, low risk Mammaprint.   Elham is doing well today, continues to tolerate anastrazole since 9/2020.   Currently I am no appreciating any swelling under the left eye. She has been seen by her eye dr as well. I have discussed with her any worsening of this for her and we would consider imaging to evaluate.       There is no evidence on clinical exam today for breast cancer recurrence.

## 2023-10-10 NOTE — PROGRESS NOTES
Patient ID: [place] Alatorre is a 67 y.o. female.  BREAST CANCER DIAGNOSIS:   Breast   AJCC Edition: 8th (AJCC), Diagnosis Date: Sep 2020, Stage(no match), cT1b cN1 cM0 G2    Treatment Synopsis:    BREAST CANCER DIAGNOSIS  cT1N1 ER+(99%)/DE+(40%)/HER2 negative right sided breast cancer w/biopsy proven axillary involvement, low risk mammaprint, luminal A    - 20 mammographic abnormality on Right following many years of no mammographic screening.  She had not had a mammogram in years    - 20 Ultrasound guided biopsy at [facility] in [place], [place] found to have Invasive ductal carcinoma with biopsy @ 11:00, also with focal mucinous features. ER+(99%)/DE+(40%)/HER2 negative. Axillary biopsy ipsilateral positive for breast cancer.     -20  staging scans which were negative for any definitive evidence of distant metastases. Borderline  hilar and mediastinal nodes, largest measuring 1.3cm which require f/u.    -20 Medical oncology consult at  with Dr. Steve Negrete. Tissue to be tested for mammaprint.  Initiated for neoadjuvant endocrine therapy w/anastrozole. Mammaprint low risk luminal A    - 10/2020 Surgical oncology consult with Dr. Remi Glover    -2/15/21 Right breast lumpectomy after magseed localization, Right axillary sentinel node biopsy with Dr. Remi Glover    - 21--21 50 Gy in 25 Fx R tangs followed by boost (1000 cGy) radiation therapy x 30 treatments with Dr. Marques Cruz      Past Medical History: [place]ham has a past medical history of Breast cancer (CMS/[facility]) (2021), Other conditions influencing health status (2021), Personal history of irradiation, Personal history of other diseases of the musculoskeletal system and connective tissue (10/29/2020), and Personal history of other endocrine, nutritional and metabolic disease (10/29/2020).    Social History:  3 sons. They live locally to [place].    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 Quit Smoking approx 2010. 35 year hx 1ppd,  still smokes.   Family History:    Family History   Problem Relation Name Age of Onset    Other (cardiac disorder) Mother      Other (cardiac disorder) Father      Other (malignant neoplasm of esophagus) Father      Breast cancer Mother's Sister      Other (malignant neoplasm of stomach) Maternal Grandfather      Other (malignant neoplasm of oral cavity) Paternal Grandmother      Breast cancer Other maternal cousin     Other (malignant neoplasm of esophagus) Other maternal cousin      Family Oncology History:  Cancer-related family history includes Breast cancer in her mother's sister and another family member.      BREAST CANCER DIAGNOSIS  [id number] ER+/NJ+/HER2- breast cancer luminal A, low risk Mammaprint      CURRENT THERAPY  has been on anastrozole since 9/25/2020-- initially preoperative, now adjuvant.  Bisphosphonate therapy with Zometa initiated 10/29/21    HISTORY OF PRESENT ILLNESS:  [place] Alatorre is a 67 y.o. female who comes today for breast cancer treatment follow-up, surveillance. Continued compliance with daily anastrazole. She denies any breast cancer concerns.     She denies any chest pain or breathing issues, sob. She continues with baseline dry cough since her previous covid infections.     She denies any vision changes or headache issues, dizziness or loss of balance or falls    She reports baseline issues with left shoulder pain- has established with Dr Beatty. Does water aerobics. She has baseline back pain from previous spinal fusion and hardware. She denies any new concerning bone aches or bone pains.     She continues Vit D weekly high dose supplementation for many years through her PCP.     She denies any skin lesions or masses, oral sores lesions or infections. Continues to use right arm lymphedema sleeve and glove at night and has no issues. She is compliant with  "massage. She reports unchanged mild swelling under her left eye, non tender and comes and goes, was seen by eye dr.     She reports a normal appetite with normal bowel movements. She reports normal urination. She is going to be going on Ozempic for DMII.     She reports sleep is ok, fatigue is stable.       Review of Systems - Oncology  ROS 14 points performed, See HPI for exceptions    OBJECTIVE:    VS / Pain:  /69   Pulse 89   Temp 36.2 °C (97.2 °F)   Ht 1.676 m (5' 6\")   Wt 77.8 kg (171 lb 9.6 oz)   SpO2 96%   BMI 27.70 kg/m²   BSA: 1.9 meters squared   Pain Scale: 0    Performance Status:       Physical Exam  Constitutional: Well developed, awake/alert/oriented x4, no distress, alert and cooperative  EYES: Sclera clear  ENMT: mucous membranes moist, no apparent injury, no lesions seen  Head/Neck: Neck supple, no apparent injury, thyroid without mass or tenderness, No JVD, trachea midline, no bruits. No apparent swelling under left eye, no swelling of lacrimal sac  Respiratory / Thoracic: Patent airways, clear to all lobes, normal breath sounds with good chest expansion, thorax symmetric.  Cardiovascular: Regular, rate and rhythm, no murmurs, 2+ equal pulses of the extremities, normal auscultated S 1and S 2  GI: Nondistended, soft, non-tender, no rebound tenderness or guarding, no masses palpable, no organomegaly, +BS, no bruits  Musculoskeletal: ROM intact, no joint swelling, normal strength, no spinal tenderness  Extremities: normal extremities, no cyanosis edema, contusions or wounds, no clubbing  Neurological: alert and oriented x4, intact senses, motor, response and reflexes, normal strength  Breast: Left chest and axilla supple, free of masses or lesions. Right chest s/p lumpectomy with well healed incision- chest and axilla with mild skin thickening around nipple s/p RT; no evidence of chest or axilla masses or lesions  Lymphatic: No cervical, supraclavicular, infraclavicular or axillary " lymphadenopathy  Psychological: Appropriate and talkative mood and behavior  Skin: Warm and dry, no lesions, no rashes, no jaundice    Diagnostic Results        Indications  Indications Free Text Note Form_UH: Indications: Postmenopausal, mother with hip  fracture, treatment with aromatase inhibitor       Referring provider: Steve Negrete MD    Vitals:   [age] year old white female  Height: 65.4 inches  Weight: 166 pounds.      Results/Data  The bone mineral density of the regions of interest of the spine, forearm and  femur were obtained using GE Lunar Prodigy Advance technology. There was  satisfactory alignment of the regions of interest of the spine, forearm and  femur, but there is scoliosis of the lower lumbar spine.       Results:  Scan type........... Region........................................... BMD  (g/cm2).... T score... Z score  AP spine.............. L2-L4 ............................................ 1.168  ............... -0.3 ....... +0.9  Left forearm ....... Radius 33% ................................... 0.758  ............... -1.5 ....... -0.2  Dual femur........... Left Femoral Neck............................ 0.716  .............. -2.3 ....... -1.1     Provider Impressions  Based on the results of this bone mineral density scan, the patient's  T-score is consistent with osteopenia at one or more sites, according to the  World Health Organization Criteria.      Based on the calculated FRAX score the 10-year probability of a hip fracture is  2.2% and the 10-year probability of a major osteoporosis-related fracture is  21.8%.         * The patient should maintain an adequate intake of dietary Calcium and Vitamin  if there are no contraindications.   * Consider evaluation for secondary causes of low bone mass by checking Vitamin  D, PTH, TSH, SPEP and UPEP, if clinically indicated.   * Recommend initiation of anti-resorptive therapy for the treatment of  osteopenia with high FRAX score.  *  Tobacco use and alcohol use should be avoided.  * The patient should engage in regular weight bearing and muscle strengthening  exercises as tolerated.   * Strategies to reduce fall risk should be implemented.   * The patient should have a repeat BMD testing in approximately two years for  reevaluation.          Signatures   Electronically signed by : Luis A Berger MD; Oct  9 2021 11:48AM ES      BI mammo bilateral screening tomosynthesis  Narrative: Interpreted By:  Kaylin Jones,   STUDY:  BI MAMMO BILATERAL SCREENING TOMOSYNTHESIS;  10/10/2023 11:18 am      ACCESSION NUMBER(S):  [id number]      ORDERING CLINICIAN:  ELOY AARON      INDICATION:  Screening. Right lumpectomy with radiation treatment.      COMPARISON:  10/04/2022, 09/28/2021, 02/02/2021      FINDINGS:  2D and tomosynthesis images were reviewed at 1 mm slice thickness.      Density:  The breast tissue is heterogeneously dense, which may  obscure small masses.      Stable postsurgical scarring with surgical clips in the upper outer  right breast at posterior depth, the site of lumpectomy. Stable  postsurgical scarring overlying the right axilla. Stable mild  trabecular thickening and skin thickening right breast consistent  with radiation treatment. No suspicious masses or calcifications are  identified.      Impression: No mammographic evidence of malignancy. Stable postoperative and  postradiation changes, right breast.      BI-RADS CATEGORY:      BI-RADS Category:  2 Benign.  Recommendation:  Routine Screening Mammogram in 1 Year.  Recommended Date:  1 Year.  Laterality:  Bilateral.      For any future breast imaging appointments, please call [phone]).          MACRO:  None      Signed by: Kaylin Jones 10/10/2023 11:56 AM  Dictation workstation:   [id number]       Comprehensive Metabolic Panel, Fasting  Order: [id number]  Component  Ref Range & Units 6 d ago   Sodium  132 - 146 mmol/L 138   Potassium  3.5 - 5.0 mmol/L 4.9    Chloride  98 - 107 mmol/L 99   CO2  22 - 29 mmol/L 19 Low    Anion Gap  7 - 16 mmol/L 20 High    Glucose, Fasting  74 - 99 mg/dL 138 High    BUN  6 - 23 mg/dL 10   Creatinine  0.50 - 1.00 mg/dL 0.6   Est, Glom Filt Rate  >60 mL/min/1.73m2 >60   Comment:  These results are not intended for use in patients <18 years of age.    eGFR results are calculated without a race factor using the 2021 CKD-EPI equation.  Careful clinical correlation is recommended, particularly when comparing to results  calculated using previous equations.  The CKD-EPI equation is less accurate in patients with extremes of muscle mass, extra-renal  metabolism of creatine, excessive creatine ingestion, or following therapy that affects  renal tubular secretion.   Calcium  8.6 - 10.2 mg/dL 9.3   Total Protein  6.4 - 8.3 g/dL 6.9   Albumin  3.5 - 5.2 g/dL 3.9   Total Bilirubin  0.0 - 1.2 mg/dL 0.3   Alkaline Phosphatase  35 - 104 U/L 91   ALT  0 - 32 U/L 18   AST  0 - 31 U/L 18   Resulting Agency [facility]     Specimen Collected: 10/04/23 11:05    Performed by: [facility] Last Resulted: 10/04/23 23:11     [facility]  Outside Information      suggestion  Information displayed in this report may not trend or trigger automated decision support.      Contains abnormal data CBC  Order: [id number]  Component  Ref Range & Units 6 d ago   WBC  4.5 - 11.5 k/uL 7.2   RBC  3.50 - 5.50 m/uL 4.28   Hemoglobin  11.5 - 15.5 g/dL 13.5   Hematocrit  34.0 - 48.0 % 43.3   MCV  80.0 - 99.9 fL 101.2 High    MCH  26.0 - 35.0 pg 31.5   MCHC  32.0 - 34.5 g/dL 31.2 Low    RDW  11.5 - 15.0 % 12.9   Platelets  130 - 450 k/uL 244   MPV  7.0 - 12.0 fL 10.3   Resulting Agency [facility]     Specimen Collected: 10/04/23 11:05    Performed by: [facility] Last Resulted: 10/04/23 21:58   [facility]  Outside  Information      suggestion  Information displayed in this report may not trend or trigger automated decision support.     Vitamin D 25 Hydroxy  Order: [id number]  Component  Ref Range & Units 6 d ago   Vit D, 25-Hydroxy  30.0 - 100.0 ng/mL 42.9   Resulting Agency [facility]     Specimen Collected: 10/04/23 11:05    Performed by: [facility] Last Resulted: 10/04/23 22:53   Received From: [facility]  Result Received: 10/10/23 10:42       Assessment/Plan   Malignant neoplasm of upper-outer quadrant of right female breast (CMS/HCC), Clinical: Stage IB (cT1b, cN1, cM0, G2, ER+, MS+, HER2-)  [place] was seen today for follow-up.  Diagnoses and all orders for this visit:  Malignant neoplasm of upper-outer quadrant of right breast in female, estrogen receptor positive (CMS/HCC) (Primary)  -     XR DEXA bone density; Future  -     Clinic Appointment Request Follow up; ELOY AARON; Future  -     anastrozole (Arimidex) 1 mg tablet; Take 1 tablet (1 mg total) by mouth once daily.  Encounter for monitoring anastrozole therapy  -     XR DEXA bone density; Future  -     Clinic Appointment Request Follow up; ELOY AARON; Future  Osteopenia, unspecified location  -     XR DEXA bone density; Future  -     Clinic Appointment Request Follow up; ELOY AARON; Future  Lymphedema  -     Clinic Appointment Request Follow up; ELOY AARON; Future  Routine health maintenance  Menopause  -     XR DEXA bone density; Future  -     Clinic Appointment Request Follow up; ELOY AARON; Future  Other orders  -     heparin flush 10 unit/mL syringe 50 Units  -     heparin flush 100 unit/mL injection 500 Units  -     alteplase (Cathflo Activase) injection 2 mg  -     sodium chloride 0.9 % bolus 500 mL  -     zoledronic acid (Zometa) IVPB 4 mg  -     sodium chloride 0.9 % bolus 500 mL  -     dextrose 5 % in water (D5W) bolus  -      diphenhydrAMINE (BENADryl) injection 50 mg  -     methylPREDNISolone sod suc / (SOLU-Medrol) 40 mg injection 40 mg  -     famotidine PF (Pepcid) injection 20 mg  -     EPINEPHrine (Epipen) injection syringe 0.3 mg  -     albuterol 2.5 mg /3 mL (0.083 %) nebulizer solution 3 mL  -     Nursing Communication    Problem List Items Addressed This Visit       Lymphedema    Current Assessment & Plan     Lymphedema.   Stable right arm and hand lymph swelling. She is performing self massage Right arm and breast, compliant with compression garments at night          Relevant Orders    Clinic Appointment Request Follow up; ELOY AARON    Malignant neoplasm of upper-outer quadrant of right female breast (CMS/HCC) - Primary    Current Assessment & Plan     [id number] ER+/NJ+/HER2- breast cancer luminal A, low risk Mammaprint.   [place]ham is doing well today, continues to tolerate anastrazole since 9/2020.   Currently I am no appreciating any swelling under the left eye. She has been seen by her eye dr as well. I have discussed with her any worsening of this for her and we would consider imaging to evaluate.       There is no evidence on clinical exam today for breast cancer recurrence.          Relevant Medications    anastrozole (Arimidex) 1 mg tablet    Other Relevant Orders    XR DEXA bone density    Clinic Appointment Request Follow up; ELOY AARON    Encounter for monitoring anastrozole therapy    Current Assessment & Plan     Updated Prescription today, no evidence of toxicities or intolerances.          Relevant Orders    XR DEXA bone density    Clinic Appointment Request Follow up; ELOY AARON    Osteopenia    Current Assessment & Plan     Osteopenia.   Zometa initiated in October 2021 without any major issues. Orders placed today for  final dose #6 May 2024.    Labs today for #5 due in Nov   Planned repeat DEXA late 2023, orders placed today.         Relevant Orders    XR DEXA bone density     Clinic Appointment Request Follow up; LATHA AARON    Routine health maintenance    Current Assessment & Plan     General health maintenance   Continues to follow with Dr. Mota          Other Visit Diagnoses       Menopause        Relevant Orders    XR DEXA bone density    Clinic Appointment Request Follow up; LATHA AARON             Treatment Plan:  Venous Access Orders  Zoledronic Acid (Zometa), Every 24 Weeks      Thank you for the opportunity to be involved in the care of [place] Alatorre.   This is a 67 y.o. female with       We discussed the clinical significance of diagnosis, goals of care and treatment plan in detail.         Please do not hesitate to reach out with any questions. Thank you.     -------------------------------------------------------------------------------------------------------------------------------  Latha Aaron MSN, APRN, FNP-C  [facility]  Division of Medical Oncology- Breast   Collaborating Physician Dr. Steve Negrete   [place] [address]  Phone: [phone]  Fax: [phone]  Available via Secure Chat    Confidential Peer Review Document-  Privilege  Privileged Pursuant to [place] Revised Code Section 2305.24, .25, .251 & .252

## 2023-10-10 NOTE — PATIENT INSTRUCTIONS
Latha LUCERO, CNP follow-up 6 months April 2024.  Same days for labs for Zometa      Planned #5 is due in Nov 2023, #6 and Final in May 2024    DEXA- planned repeat late 2023- orders placed today    PCP Dr Mota annually and as needed.     Call with any questions, concerns or treatment intolerance prior to next office visit [phone].

## 2023-10-10 NOTE — ASSESSMENT & PLAN NOTE
Lymphedema.   Stable right arm and hand lymph swelling. She is performing self massage Right arm and breast, compliant with compression garments at night

## 2023-10-10 NOTE — ASSESSMENT & PLAN NOTE
Osteopenia.   Zometa initiated in October 2021 without any major issues. Orders placed today for  final dose #6 May 2024.    Labs today for #5 due in Nov   Planned repeat DEXA late 2023, orders placed today.

## 2023-10-16 RX ORDER — BLOOD SUGAR DIAGNOSTIC
STRIP MISCELLANEOUS
Qty: 100 STRIP | Refills: 3 | Status: SHIPPED | OUTPATIENT
Start: 2023-10-16

## 2023-10-17 ENCOUNTER — OFFICE VISIT (OUTPATIENT)
Dept: PAIN MANAGEMENT | Age: 68
End: 2023-10-17
Payer: MEDICARE

## 2023-10-17 VITALS
DIASTOLIC BLOOD PRESSURE: 70 MMHG | OXYGEN SATURATION: 94 % | RESPIRATION RATE: 18 BRPM | HEART RATE: 91 BPM | HEIGHT: 65 IN | SYSTOLIC BLOOD PRESSURE: 122 MMHG | WEIGHT: 170 LBS | TEMPERATURE: 96.4 F | BODY MASS INDEX: 28.32 KG/M2

## 2023-10-17 DIAGNOSIS — M47.812 CERVICAL FACET JOINT SYNDROME: ICD-10-CM

## 2023-10-17 DIAGNOSIS — M47.812 CERVICAL SPONDYLOSIS: ICD-10-CM

## 2023-10-17 DIAGNOSIS — M47.814 THORACIC SPONDYLOSIS: ICD-10-CM

## 2023-10-17 DIAGNOSIS — M47.894 THORACIC FACET JOINT SYNDROME: ICD-10-CM

## 2023-10-17 DIAGNOSIS — M51.9 LUMBAR DISC DISORDER: ICD-10-CM

## 2023-10-17 DIAGNOSIS — M47.816 LUMBAR SPONDYLOSIS: ICD-10-CM

## 2023-10-17 DIAGNOSIS — M41.9 SCOLIOSIS OF THORACIC SPINE, UNSPECIFIED SCOLIOSIS TYPE: ICD-10-CM

## 2023-10-17 DIAGNOSIS — M51.9 THORACIC DISC DISEASE: ICD-10-CM

## 2023-10-17 DIAGNOSIS — M50.90 CERVICAL DISC DISORDER: ICD-10-CM

## 2023-10-17 DIAGNOSIS — G89.4 CHRONIC PAIN SYNDROME: Primary | ICD-10-CM

## 2023-10-17 PROCEDURE — 99204 OFFICE O/P NEW MOD 45 MIN: CPT | Performed by: PAIN MEDICINE

## 2023-10-17 PROCEDURE — 1123F ACP DISCUSS/DSCN MKR DOCD: CPT | Performed by: PAIN MEDICINE

## 2023-10-17 RX ORDER — SODIUM CHLORIDE 9 MG/ML
INJECTION, SOLUTION INTRAVENOUS PRN
OUTPATIENT
Start: 2023-10-17

## 2023-10-17 RX ORDER — HYDROCODONE BITARTRATE AND ACETAMINOPHEN 5; 325 MG/1; MG/1
0.5 TABLET ORAL EVERY 8 HOURS PRN
Qty: 45 TABLET | Refills: 0 | Status: SHIPPED | OUTPATIENT
Start: 2023-10-17 | End: 2023-11-16

## 2023-10-17 RX ORDER — SODIUM CHLORIDE 0.9 % (FLUSH) 0.9 %
5-40 SYRINGE (ML) INJECTION PRN
OUTPATIENT
Start: 2023-10-17

## 2023-10-17 RX ORDER — SODIUM CHLORIDE 0.9 % (FLUSH) 0.9 %
5-40 SYRINGE (ML) INJECTION EVERY 12 HOURS SCHEDULED
OUTPATIENT
Start: 2023-10-17

## 2023-10-17 NOTE — PROGRESS NOTES
[address], [address] [place] [address]      [phone]          Consult Note      Patient:  DANIKA Feldman 1955    Date of Service:  10/17/23    Requesting Physician:  Deborah Diaz DO    Reason for Consult:      Patient presents with complaints of neck/mid and low back pain that started a long time ago and has been progressively getting worse. Pain is described as sharp/stabbing/shooting/constant pain. Pain is aggravated by walking/sitting. Pain is relieved Ultram/NSAIDs. HISTORY OF PRESENT ILLNESS:      Pain does radiate to Left upper and lower extremity down to the foot. She  has numbness of the left arm and Left foot and does not have bladder or bowel dysfunction. She has not been on anticoagulation medications to include none. The patient  has not been on herbal supplements. The patient is diabetic. Imaging:   Lumbar spine MRI 2020:  L1/L2   There is normal alignment and vertebral body height. The disc space   is normal. There is no evidence of canal or foraminal narrowing. There is no evidence of bulging or herniated disc. L2/L3   There is normal alignment and vertebral body height. The disc space   is normal. There is no evidence of canal or foraminal narrowing. There is no evidence of bulging or herniated disc. L3/L4   Asymmetrical bulging disc to the left with circumferentially oriented   annular tear. No canal or foraminal narrowing. L4/L5   Trace spondylolisthesis and facet hypertrophy without canal stenosis. Mild bilateral foraminal narrowing. L5/S1   Asymmetrical bulging disc and/or osteophyte formation toward the   left. Bilateral facet hypertrophy. No measurable canal stenosis. Bilateral foraminal narrowing worse on the left than the right. Previous treatments: Physical Therapy, Surgery Lindsey neida, injections with , and medications. .      Opioid Agreement:  Renewal

## 2023-10-26 ENCOUNTER — PREP FOR PROCEDURE (OUTPATIENT)
Dept: PAIN MANAGEMENT | Age: 68
End: 2023-10-26

## 2023-11-02 ENCOUNTER — HOSPITAL ENCOUNTER (OUTPATIENT)
Age: 68
Setting detail: OUTPATIENT SURGERY
Discharge: HOME | End: 2023-11-02
Attending: PAIN MEDICINE | Admitting: PAIN MEDICINE
Payer: MEDICARE

## 2023-11-02 ENCOUNTER — HOSPITAL ENCOUNTER (OUTPATIENT)
Dept: OPERATING ROOM | Age: 68
Setting detail: OUTPATIENT SURGERY
Discharge: HOME | End: 2023-11-02
Attending: PAIN MEDICINE
Payer: MEDICARE

## 2023-11-02 VITALS
WEIGHT: 170 LBS | DIASTOLIC BLOOD PRESSURE: 65 MMHG | RESPIRATION RATE: 16 BRPM | BODY MASS INDEX: 28.32 KG/M2 | OXYGEN SATURATION: 95 % | HEIGHT: 65 IN | SYSTOLIC BLOOD PRESSURE: 118 MMHG | HEART RATE: 89 BPM

## 2023-11-02 DIAGNOSIS — M47.896 OTHER OSTEOARTHRITIS OF SPINE, LUMBAR REGION: ICD-10-CM

## 2023-11-02 PROCEDURE — 2500000003 HC RX 250 WO HCPCS: Performed by: PAIN MEDICINE

## 2023-11-02 PROCEDURE — 64494 INJ PARAVERT F JNT L/S 2 LEV: CPT | Performed by: PAIN MEDICINE

## 2023-11-02 PROCEDURE — 2709999900 HC NON-CHARGEABLE SUPPLY: Performed by: PAIN MEDICINE

## 2023-11-02 PROCEDURE — 7100000010 HC PHASE II RECOVERY - FIRST 15 MIN: Performed by: PAIN MEDICINE

## 2023-11-02 PROCEDURE — 3600000005 HC SURGERY LEVEL 5 BASE: Performed by: PAIN MEDICINE

## 2023-11-02 PROCEDURE — 6360000002 HC RX W HCPCS: Performed by: PAIN MEDICINE

## 2023-11-02 PROCEDURE — 64493 INJ PARAVERT F JNT L/S 1 LEV: CPT | Performed by: PAIN MEDICINE

## 2023-11-02 PROCEDURE — 7100000011 HC PHASE II RECOVERY - ADDTL 15 MIN: Performed by: PAIN MEDICINE

## 2023-11-02 RX ORDER — SODIUM CHLORIDE 9 MG/ML
INJECTION, SOLUTION INTRAVENOUS PRN
Status: DISCONTINUED | OUTPATIENT
Start: 2023-11-02 | End: 2023-11-02 | Stop reason: HOSPADM

## 2023-11-02 RX ORDER — SODIUM CHLORIDE 0.9 % (FLUSH) 0.9 %
5-40 SYRINGE (ML) INJECTION PRN
Status: DISCONTINUED | OUTPATIENT
Start: 2023-11-02 | End: 2023-11-02 | Stop reason: HOSPADM

## 2023-11-02 RX ORDER — LIDOCAINE HYDROCHLORIDE 5 MG/ML
INJECTION, SOLUTION INFILTRATION; INTRAVENOUS PRN
Status: DISCONTINUED | OUTPATIENT
Start: 2023-11-02 | End: 2023-11-02 | Stop reason: ALTCHOICE

## 2023-11-02 RX ORDER — SODIUM CHLORIDE 0.9 % (FLUSH) 0.9 %
5-40 SYRINGE (ML) INJECTION EVERY 12 HOURS SCHEDULED
Status: DISCONTINUED | OUTPATIENT
Start: 2023-11-02 | End: 2023-11-02 | Stop reason: HOSPADM

## 2023-11-02 ASSESSMENT — PAIN - FUNCTIONAL ASSESSMENT: PAIN_FUNCTIONAL_ASSESSMENT: 0-10

## 2023-11-02 ASSESSMENT — PAIN SCALES - GENERAL: PAINLEVEL_OUTOF10: 0

## 2023-11-02 NOTE — OP NOTE
2023    Patient: Cristopher Angel  :  1955  Age:  79 y.o. Sex:  female     PRE-OPERATIVE DIAGNOSIS: Bilateral Lumbar spondylosis, lumbar facet syndrome. POST-OPERATIVE DIAGNOSIS: Same. PROCEDURE:  # 1 Fluoroscopic guided lumbar medial branch blocks Bilateral at Levels: L3, L4, L5 MB. SURGEON: PAZ Byrd M.D. ANESTHESIA: Local    ESTIMATED BLOOD LOSS: None.  ______________________________________________________________________  BRIEF HISTORY:  Cristopher Angel comes in today for 1 fluoroscopic guided lumbar medial branch blocks  Bilateral  at Levels: L3, L4, L5 MB. The potential complications of this procedure were discussed with her again today. She has elected to undergo the aforementioned procedure. Vern complete History & Physical examination were reviewed in depth, a copy of which is in the chart. DESCRIPTION OF PROCEDURE:   After confirming written and informed consent, a time-out was performed and Vern name and date of birth, the procedure to be performed as well as the plan for the location of the needle insertion were confirmed. The patient was brought into the procedure room and placed in the prone position on the fluoroscopy table. Standard monitors were placed and vital signs were observed throughout the procedure. The area of the lumbar spine was prepped with chloraprep and draped in a sterile manner. AP fluoroscopy was used to identify and jeet bartons point at the targeted levels. The skin and subcutaneous tissues in these identified areas were anesthetized with 0.5%Lidocaine. A 22 # gauge 5 inch spinal needle was advanced toward the junction of the superior articular process and the transverse process, along the course of the medial branch. Satisfactory needle placement was confirmed by AP and oblique projections.   At the sacral alar level, the sacral alar region was visualized and the needle tip was positioned on the sacral alar at the base of

## 2023-11-02 NOTE — H&P
test strips (ONETOUCH ULTRA) strip CHECK BLOOD SUGAR ONCE DAILY 10/16/23   Merced Geller, DO   glipiZIDE (GLUCOTROL XL) 5 MG extended release tablet TAKE 1 TABLET BY MOUTH EVERY DAY BEFORE BREAKFAST 23   Merced Geller, DO   levothyroxine (SYNTHROID) 25 MCG tablet TAKE 1 TABLET BY MOUTH EVERY DAY 23   Merced Geller, DO   atorvastatin (LIPITOR) 20 MG tablet TAKE 1 TABLET BY MOUTH EVERY DAY 23   Merced Geller, DO   metFORMIN (GLUCOPHAGE) 500 MG tablet TAKE 2 TABLETS BY MOUTH TWICE A DAY 23   Merced Geller, DO   vitamin D (CHOLECALCIFEROL) 78108 UNIT CAPS Take 1 capsule by mouth once a week 22   Merced Geller, DO   traMADol (ULTRAM) 50 MG tablet Take 1 tablet by mouth every 6 hours as needed for Pain. ProviderIsaac MD   anastrozole (ARIMIDEX) 1 MG tablet TAKE 1 TABLET BY MOUTH EVERY DAY 22   ProviderIsaac MD   Naproxen Sodium (ALEVE PO) Take  by mouth as needed.     ProviderIsaac MD       No Known Allergies    Social History     Socioeconomic History    Marital status:      Spouse name: Not on file    Number of children: Not on file    Years of education: Not on file    Highest education level: Not on file   Occupational History    Not on file   Tobacco Use    Smoking status: Former     Packs/day: 0.50     Years: 20.00     Additional pack years: 0.00     Total pack years: 10.00     Types: Cigarettes     Quit date: 2017     Years since quittin.1    Smokeless tobacco: Never   Vaping Use    Vaping Use: Never used   Substance and Sexual Activity    Alcohol use: Never    Drug use: Never    Sexual activity: Defer     Partners: Male   Other Topics Concern    Not on file   Social History Narrative    Not on file     Social Determinants of Health     Financial Resource Strain: Low Risk  (3/28/2023)    Overall Financial Resource Strain (CARDIA)     Difficulty of Paying Living Expenses: Not hard at all   Food Insecurity: No Food Insecurity

## 2023-11-08 ENCOUNTER — INFUSION (OUTPATIENT)
Dept: HEMATOLOGY/ONCOLOGY | Facility: CLINIC | Age: 68
End: 2023-11-08
Payer: MEDICARE

## 2023-11-08 VITALS
SYSTOLIC BLOOD PRESSURE: 126 MMHG | DIASTOLIC BLOOD PRESSURE: 75 MMHG | RESPIRATION RATE: 18 BRPM | WEIGHT: 169.75 LBS | HEART RATE: 78 BPM | BODY MASS INDEX: 27.4 KG/M2 | TEMPERATURE: 97.3 F

## 2023-11-08 DIAGNOSIS — Z17.0 MALIGNANT NEOPLASM OF UPPER-OUTER QUADRANT OF RIGHT BREAST IN FEMALE, ESTROGEN RECEPTOR POSITIVE (MULTI): ICD-10-CM

## 2023-11-08 DIAGNOSIS — C50.411 MALIGNANT NEOPLASM OF UPPER-OUTER QUADRANT OF RIGHT BREAST IN FEMALE, ESTROGEN RECEPTOR POSITIVE (MULTI): ICD-10-CM

## 2023-11-08 PROCEDURE — 96374 THER/PROPH/DIAG INJ IV PUSH: CPT | Mod: INF

## 2023-11-08 PROCEDURE — 2500000004 HC RX 250 GENERAL PHARMACY W/ HCPCS (ALT 636 FOR OP/ED): Performed by: NURSE PRACTITIONER

## 2023-11-08 RX ORDER — ALBUTEROL SULFATE 0.83 MG/ML
3 SOLUTION RESPIRATORY (INHALATION) AS NEEDED
Status: CANCELLED | OUTPATIENT
Start: 2024-04-21

## 2023-11-08 RX ORDER — FAMOTIDINE 10 MG/ML
20 INJECTION INTRAVENOUS ONCE AS NEEDED
Status: CANCELLED | OUTPATIENT
Start: 2024-04-21

## 2023-11-08 RX ORDER — DIPHENHYDRAMINE HYDROCHLORIDE 50 MG/ML
50 INJECTION INTRAMUSCULAR; INTRAVENOUS AS NEEDED
Status: CANCELLED | OUTPATIENT
Start: 2024-04-21

## 2023-11-08 RX ORDER — EPINEPHRINE 0.3 MG/.3ML
0.3 INJECTION SUBCUTANEOUS EVERY 5 MIN PRN
Status: CANCELLED | OUTPATIENT
Start: 2024-04-21

## 2023-11-08 RX ADMIN — ZOLEDRONIC ACID 4 MG: 4 INJECTION, SOLUTION, CONCENTRATE INTRAVENOUS at 11:14

## 2023-11-08 RX ADMIN — SODIUM CHLORIDE 500 ML: 900 INJECTION, SOLUTION INTRAVENOUS at 10:43

## 2023-11-08 ASSESSMENT — PAIN SCALES - GENERAL: PAINLEVEL: 0-NO PAIN

## 2023-11-13 RX ORDER — METHOCARBAMOL 750 MG/1
1 TABLET ORAL WEEKLY
Qty: 12 CAPSULE | Refills: 3 | Status: SHIPPED | OUTPATIENT
Start: 2023-11-13

## 2023-11-13 NOTE — TELEPHONE ENCOUNTER
Requested Prescriptions     Pending Prescriptions Disp Refills    D3-50 1.25 MG (66937 UT) CAPS [Pharmacy Med Name: VITAMIN D3-50 50,000 UNIT CAP] 12 capsule 3     Sig: TAKE 1 CAPSULE BY MOUTH ONE TIME PER WEEK       Next appt is 1/11/2024  Last appt was 10/4/2023

## 2023-11-14 ENCOUNTER — OFFICE VISIT (OUTPATIENT)
Dept: PAIN MANAGEMENT | Age: 68
End: 2023-11-14
Payer: MEDICARE

## 2023-11-14 VITALS
HEART RATE: 74 BPM | OXYGEN SATURATION: 98 % | WEIGHT: 170 LBS | SYSTOLIC BLOOD PRESSURE: 124 MMHG | RESPIRATION RATE: 18 BRPM | HEIGHT: 65 IN | BODY MASS INDEX: 28.32 KG/M2 | TEMPERATURE: 96.1 F | DIASTOLIC BLOOD PRESSURE: 68 MMHG

## 2023-11-14 DIAGNOSIS — M41.9 SCOLIOSIS OF THORACIC SPINE, UNSPECIFIED SCOLIOSIS TYPE: ICD-10-CM

## 2023-11-14 DIAGNOSIS — M47.812 CERVICAL SPONDYLOSIS: ICD-10-CM

## 2023-11-14 DIAGNOSIS — M51.9 LUMBAR DISC DISORDER: ICD-10-CM

## 2023-11-14 DIAGNOSIS — M47.894 THORACIC FACET JOINT SYNDROME: ICD-10-CM

## 2023-11-14 DIAGNOSIS — M47.814 THORACIC SPONDYLOSIS: ICD-10-CM

## 2023-11-14 DIAGNOSIS — M51.9 THORACIC DISC DISEASE: ICD-10-CM

## 2023-11-14 DIAGNOSIS — G89.4 CHRONIC PAIN SYNDROME: Primary | ICD-10-CM

## 2023-11-14 DIAGNOSIS — M47.816 LUMBAR SPONDYLOSIS: ICD-10-CM

## 2023-11-14 DIAGNOSIS — Z79.891 ENCOUNTER FOR LONG-TERM OPIATE ANALGESIC USE: ICD-10-CM

## 2023-11-14 DIAGNOSIS — M47.812 CERVICAL FACET JOINT SYNDROME: ICD-10-CM

## 2023-11-14 DIAGNOSIS — G89.4 CHRONIC PAIN SYNDROME: ICD-10-CM

## 2023-11-14 DIAGNOSIS — M50.90 CERVICAL DISC DISORDER: ICD-10-CM

## 2023-11-14 PROCEDURE — 99213 OFFICE O/P EST LOW 20 MIN: CPT | Performed by: PAIN MEDICINE

## 2023-11-14 PROCEDURE — 99214 OFFICE O/P EST MOD 30 MIN: CPT | Performed by: PAIN MEDICINE

## 2023-11-14 PROCEDURE — 1123F ACP DISCUSS/DSCN MKR DOCD: CPT | Performed by: PAIN MEDICINE

## 2023-11-14 RX ORDER — SODIUM CHLORIDE 9 MG/ML
INJECTION, SOLUTION INTRAVENOUS PRN
OUTPATIENT
Start: 2023-11-14

## 2023-11-14 RX ORDER — HYDROCODONE BITARTRATE AND ACETAMINOPHEN 5; 325 MG/1; MG/1
0.5 TABLET ORAL EVERY 8 HOURS PRN
Qty: 45 TABLET | Refills: 0 | Status: SHIPPED | OUTPATIENT
Start: 2023-11-16 | End: 2023-12-16

## 2023-11-14 RX ORDER — SODIUM CHLORIDE 0.9 % (FLUSH) 0.9 %
5-40 SYRINGE (ML) INJECTION PRN
OUTPATIENT
Start: 2023-11-14

## 2023-11-14 RX ORDER — SODIUM CHLORIDE 0.9 % (FLUSH) 0.9 %
5-40 SYRINGE (ML) INJECTION EVERY 12 HOURS SCHEDULED
OUTPATIENT
Start: 2023-11-14

## 2023-11-14 NOTE — PROGRESS NOTES
[address] [place], [place] [phone]    Follow up Note      Carry Mix     Date of Visit:  11/14/2023    CC:  Patient presents for follow up   Chief Complaint   Patient presents with    Follow-up     Bilateral L3,4,5 medial branch block       HPI:    Pain is better. Appropriate analgesia with current medications regimen: yes - fair. Change in quality of symptoms:no. Medication side effects:none. Recent diagnostic testing:Cervical and thoracic spine Xray. Recent interventional procedures:Bilateral L3,4,5 medial branch block with more than 75-80% improvement in her pain for the first 3 day, after that it was down to 50%     She has not been on anticoagulation medications to include none. The patient  has not been on herbal supplements. The patient is diabetic. Imaging:   Lumbar spine MRI 2020:  L1/L2   There is normal alignment and vertebral body height. The disc space   is normal. There is no evidence of canal or foraminal narrowing. There is no evidence of bulging or herniated disc. L2/L3   There is normal alignment and vertebral body height. The disc space   is normal. There is no evidence of canal or foraminal narrowing. There is no evidence of bulging or herniated disc. L3/L4   Asymmetrical bulging disc to the left with circumferentially oriented   annular tear. No canal or foraminal narrowing. L4/L5   Trace spondylolisthesis and facet hypertrophy without canal stenosis. Mild bilateral foraminal narrowing. L5/S1   Asymmetrical bulging disc and/or osteophyte formation toward the   left. Bilateral facet hypertrophy. No measurable canal stenosis. Bilateral foraminal narrowing worse on the left than the right. Thoracic xray  Multilevel degenerative changes. Scoliosis. Cervical spine Xray   Vertebrae:  Dextroscoliosis which is unchanged. Multilevel degenerative  joint disease within the facets.   Mild

## 2023-11-15 LAB
6-MONOACETYLMORPHINE, URINE: NEGATIVE
ABNORMAL SPECIMEN VALIDITY TEST: NORMAL
ALCOHOL URINE: NOT DETECTED MG/DL
AMPHETAMINE SCREEN URINE: NEGATIVE
BARBITURATE SCREEN URINE: NEGATIVE
BENZODIAZEPINE SCREEN, URINE: NEGATIVE
BUPRENORPHINE URINE: NEGATIVE
CANNABINOID SCREEN URINE: NEGATIVE
COCAINE METABOLITE, URINE: NEGATIVE
FENTANYL URINE: NEGATIVE
INTEGRITY CHECK, CREATININE, URINE: 49.1 MG/DL (ref 22–250)
INTEGRITY CHECK, OXIDANT, URINE: 58 MG/L
INTEGRITY CHECK, PH, URINE: 6.6 (ref 4.5–9)
INTEGRITY CHECK, SPECIFIC GRAVITY, URINE: 1.01 (ref 1–1.03)
METHADONE SCREEN, URINE: NEGATIVE
OPIATES, URINE: NEGATIVE
OXYCODONE SCREEN URINE: NEGATIVE
PHENCYCLIDINE, URINE: NEGATIVE
TEST INFORMATION: NORMAL
TRAMADOL, URINE: NEGATIVE

## 2023-11-16 LAB
6-MAM, QUANTITATIVE, URINE: <10 NG/ML
7-AMINOCLONAZEPAM, QUANTITATIVE, URINE: <50 NG/ML
ALPHA-HYDROXYALPRAZOLAM, QUANTITATIVE, URINE: <50 NG/ML
ALPHA-HYDROXYMIDAZOLAM, QUANTITATIVE, URINE: <50 NG/ML
ALPHA-HYDROXYTRIAZOLAM, QUANTITATIVE, URINE: <50 NG/ML
ALPRAZOLAM URINE QUANT: <50 NG/ML
CHLORDIAZEPOXIDE, QUANTITATIVE, URINE: <50 NG/ML
CLONAZEPAM, QUANTITATIVE, URINE: <50 NG/ML
CODEINE, QUANTITATIVE, URINE: <50 NG/ML
DIAZEPAM URINE QUANT: <50 NG/ML
FLUNITRAZEPAM, QUANTITATIVE, URINE: <50 NG/ML
FLURAZEPAM, QUANTITATIVE, URINE: <50 NG/ML
HYDROCODONE, QUANTITATIVE, URINE: <50 NG/ML
HYDROMORPHONE, QUANTITATIVE, URINE: <50 NG/ML
LORAZEPAM URINE QUANT: <50 NG/ML
MIDAZOLAM URINE QUANT: <50 NG/ML
MORPHINE, QUANTITATIVE, URINE: <50 NG/ML
NORDIAZEPAM URINE QUANT: <50 NG/ML
NORHYDROCODONE, QUANTITATIVE, URINE: <50 NG/ML
NOROXYCODONE, QUANTITATIVE, URINE: <50 NG/ML
OXAZEPAM URINE QUANT: <50 NG/ML
OXYCODONE URINE, QUANTITATIVE: <50 NG/ML
OXYMORPHONE, QUANTITATIVE, URINE: <50 NG/ML
TEMAZEPAM, QUANTITATIVE, URINE: <50 NG/ML

## 2023-12-04 ENCOUNTER — HOSPITAL ENCOUNTER (OUTPATIENT)
Dept: OPERATING ROOM | Age: 68
Setting detail: OUTPATIENT SURGERY
Discharge: HOME | End: 2023-12-04
Attending: PAIN MEDICINE
Payer: MEDICARE

## 2023-12-04 ENCOUNTER — HOSPITAL ENCOUNTER (OUTPATIENT)
Age: 68
Setting detail: OUTPATIENT SURGERY
Discharge: HOME | End: 2023-12-04
Attending: PAIN MEDICINE | Admitting: PAIN MEDICINE
Payer: MEDICARE

## 2023-12-04 VITALS
SYSTOLIC BLOOD PRESSURE: 138 MMHG | WEIGHT: 170 LBS | HEART RATE: 85 BPM | HEIGHT: 65 IN | OXYGEN SATURATION: 94 % | BODY MASS INDEX: 28.32 KG/M2 | RESPIRATION RATE: 16 BRPM | DIASTOLIC BLOOD PRESSURE: 72 MMHG | TEMPERATURE: 96.9 F

## 2023-12-04 DIAGNOSIS — M47.896 OTHER OSTEOARTHRITIS OF SPINE, LUMBAR REGION: ICD-10-CM

## 2023-12-04 PROCEDURE — 2500000003 HC RX 250 WO HCPCS: Performed by: PAIN MEDICINE

## 2023-12-04 PROCEDURE — 64493 INJ PARAVERT F JNT L/S 1 LEV: CPT | Performed by: PAIN MEDICINE

## 2023-12-04 PROCEDURE — 6360000002 HC RX W HCPCS: Performed by: PAIN MEDICINE

## 2023-12-04 PROCEDURE — 7100000011 HC PHASE II RECOVERY - ADDTL 15 MIN: Performed by: PAIN MEDICINE

## 2023-12-04 PROCEDURE — 3600000005 HC SURGERY LEVEL 5 BASE: Performed by: PAIN MEDICINE

## 2023-12-04 PROCEDURE — 7100000010 HC PHASE II RECOVERY - FIRST 15 MIN: Performed by: PAIN MEDICINE

## 2023-12-04 PROCEDURE — 2709999900 HC NON-CHARGEABLE SUPPLY: Performed by: PAIN MEDICINE

## 2023-12-04 PROCEDURE — 64494 INJ PARAVERT F JNT L/S 2 LEV: CPT | Performed by: PAIN MEDICINE

## 2023-12-04 RX ORDER — SODIUM CHLORIDE 0.9 % (FLUSH) 0.9 %
5-40 SYRINGE (ML) INJECTION EVERY 12 HOURS SCHEDULED
Status: DISCONTINUED | OUTPATIENT
Start: 2023-12-04 | End: 2023-12-04 | Stop reason: HOSPADM

## 2023-12-04 RX ORDER — SODIUM CHLORIDE 9 MG/ML
INJECTION, SOLUTION INTRAVENOUS PRN
Status: DISCONTINUED | OUTPATIENT
Start: 2023-12-04 | End: 2023-12-04 | Stop reason: HOSPADM

## 2023-12-04 RX ORDER — LIDOCAINE HYDROCHLORIDE 5 MG/ML
INJECTION, SOLUTION INFILTRATION; INTRAVENOUS PRN
Status: DISCONTINUED | OUTPATIENT
Start: 2023-12-04 | End: 2023-12-04 | Stop reason: ALTCHOICE

## 2023-12-04 RX ORDER — SODIUM CHLORIDE 0.9 % (FLUSH) 0.9 %
5-40 SYRINGE (ML) INJECTION PRN
Status: DISCONTINUED | OUTPATIENT
Start: 2023-12-04 | End: 2023-12-04 | Stop reason: HOSPADM

## 2023-12-04 ASSESSMENT — PAIN - FUNCTIONAL ASSESSMENT: PAIN_FUNCTIONAL_ASSESSMENT: 0-10

## 2023-12-04 ASSESSMENT — PAIN SCALES - GENERAL
PAINLEVEL_OUTOF10: 0
PAINLEVEL_OUTOF10: 0

## 2023-12-04 ASSESSMENT — PAIN DESCRIPTION - DESCRIPTORS: DESCRIPTORS: ACHING

## 2023-12-04 NOTE — OP NOTE
2023    Patient: Dorice Aase  :  1955  Age:  79 y.o. Sex:  female     PRE-OPERATIVE DIAGNOSIS: Bilateral Lumbar spondylosis, lumbar facet syndrome. POST-OPERATIVE DIAGNOSIS: Same. PROCEDURE:  # 1 Fluoroscopic guided lumbar medial branch blocks Bilateral at Levels: L3, L4, L5 MBB. SURGEON: PAZ Lopez M.D. ANESTHESIA: Local    ESTIMATED BLOOD LOSS: None.  ______________________________________________________________________  BRIEF HISTORY:  Dorice Aase comes in today for 1 fluoroscopic guided lumbar medial branch blocks  Bilateral  at Levels: L3, L4, L5 MB. The potential complications of this procedure were discussed with her again today. She has elected to undergo the aforementioned procedure. Vern complete History & Physical examination were reviewed in depth, a copy of which is in the chart. DESCRIPTION OF PROCEDURE:   After confirming written and informed consent, a time-out was performed and Vern name and date of birth, the procedure to be performed as well as the plan for the location of the needle insertion were confirmed. The patient was brought into the procedure room and placed in the prone position on the fluoroscopy table. Standard monitors were placed and vital signs were observed throughout the procedure. The area of the lumbar spine was prepped with chloraprep and draped in a sterile manner. AP fluoroscopy was used to identify and jeet bartons point at the targeted levels. The skin and subcutaneous tissues in these identified areas were anesthetized with 0.5%Lidocaine. A 22 # gauge 3 1/2 spinal needle was advanced toward the junction of the superior articular process and the transverse process, along the course of the medial branch. Satisfactory needle placement was confirmed by AP and oblique projections.   At the sacral alar level, the sacral alar region was visualized and the needle tip was positioned on the sacral alar at the base of

## 2023-12-04 NOTE — H&P
[address], [facility], [address] [place] [address]  [phone]    Procedure History & Physical      Jamilah Ovalles     HPI:    Patient  is here for axial low back pain for bilateral L3,4,5 MBB. Labs/imaging studies reviewed   All question and concerns addressed including R/B/A associated with the procedure    Past Medical History:   Diagnosis Date    Acquired hypothyroidism 06/30/2022    Anxiety     Anxiety and depression 02/08/2022    COVID 08/2022    Degenerative joint disease of both hips     Depression     Diverticulosis     Fibrocystic breast     History of cardiovascular stress test 07/18/2013    stress echo done    Invasive ductal carcinoma of breast ([address])     Reactive airway disease     Scoliosis     Scoliosis     Type 2 diabetes mellitus without complication ([address])     Type 2 diabetes mellitus without complication, without long-term current use of insulin ([address]) 02/08/2022    Urolithiasis     Vitamin D deficiency     Zoster        Past Surgical History:   Procedure Laterality Date    BACK SURGERY  1973    scoliosis    BREAST LUMPECTOMY Right 2020    COLONOSCOPY N/A 03/01/2022    Dr. Jose West. Follow-up 5 years. DILATION AND CURETTAGE OF UTERUS  07/1993    ECHOCARDIOGRAM EXERCISE STRESS TEST  07/18/2013         TIANNA US PERQ DEVICE SOFT TISSUE PLMT  FIRST LESION  08/31/2020    TIANNA US PERQ DEVICE SOFT TISSUE PLMT  FIRST LESION 8/31/2020 [facility]    PAIN MANAGEMENT PROCEDURE Bilateral 11/2/2023    Bilateral L3,4,5 medial branch block. performed by Miriam Luong MD at [address] [place] [address] RIGHT  07/31/2020    US BREAST NEEDLE BIOPSY RIGHT 7/31/2020 [facility]    US I&D BREAST ABSCESS DEEP  2/2/2021    US I&D BREAST ABSCESS DEEP 2/2/2021       Prior to Admission medications    Medication Sig Start Date End Date Taking?  Authorizing Provider   HYDROcodone-acetaminophen (NORCO) 5-325 MG per tablet Take 0.5 tablets by

## 2023-12-14 ENCOUNTER — OFFICE VISIT (OUTPATIENT)
Dept: PAIN MANAGEMENT | Age: 68
End: 2023-12-14
Payer: MEDICARE

## 2023-12-14 VITALS
DIASTOLIC BLOOD PRESSURE: 72 MMHG | RESPIRATION RATE: 18 BRPM | TEMPERATURE: 96.6 F | BODY MASS INDEX: 28.32 KG/M2 | HEIGHT: 65 IN | HEART RATE: 68 BPM | OXYGEN SATURATION: 95 % | WEIGHT: 170 LBS | SYSTOLIC BLOOD PRESSURE: 120 MMHG

## 2023-12-14 DIAGNOSIS — M51.9 THORACIC DISC DISEASE: ICD-10-CM

## 2023-12-14 DIAGNOSIS — M50.90 CERVICAL DISC DISORDER: ICD-10-CM

## 2023-12-14 DIAGNOSIS — G89.4 CHRONIC PAIN SYNDROME: Primary | ICD-10-CM

## 2023-12-14 DIAGNOSIS — M47.894 THORACIC FACET JOINT SYNDROME: ICD-10-CM

## 2023-12-14 DIAGNOSIS — M41.9 SCOLIOSIS OF THORACIC SPINE, UNSPECIFIED SCOLIOSIS TYPE: ICD-10-CM

## 2023-12-14 DIAGNOSIS — M47.816 LUMBAR SPONDYLOSIS: ICD-10-CM

## 2023-12-14 DIAGNOSIS — Z79.891 ENCOUNTER FOR LONG-TERM OPIATE ANALGESIC USE: ICD-10-CM

## 2023-12-14 DIAGNOSIS — M47.812 CERVICAL FACET JOINT SYNDROME: ICD-10-CM

## 2023-12-14 DIAGNOSIS — M47.814 THORACIC SPONDYLOSIS: ICD-10-CM

## 2023-12-14 DIAGNOSIS — M47.812 CERVICAL SPONDYLOSIS: ICD-10-CM

## 2023-12-14 PROCEDURE — 99214 OFFICE O/P EST MOD 30 MIN: CPT | Performed by: PAIN MEDICINE

## 2023-12-14 PROCEDURE — 1123F ACP DISCUSS/DSCN MKR DOCD: CPT | Performed by: PAIN MEDICINE

## 2023-12-14 PROCEDURE — 99213 OFFICE O/P EST LOW 20 MIN: CPT | Performed by: PAIN MEDICINE

## 2023-12-14 RX ORDER — SODIUM CHLORIDE 9 MG/ML
INJECTION, SOLUTION INTRAVENOUS PRN
OUTPATIENT
Start: 2023-12-14

## 2023-12-14 RX ORDER — SODIUM CHLORIDE 0.9 % (FLUSH) 0.9 %
5-40 SYRINGE (ML) INJECTION EVERY 12 HOURS SCHEDULED
OUTPATIENT
Start: 2023-12-14

## 2023-12-14 RX ORDER — SEMAGLUTIDE 0.68 MG/ML
INJECTION, SOLUTION SUBCUTANEOUS
COMMUNITY
Start: 2023-11-24

## 2023-12-14 RX ORDER — SODIUM CHLORIDE 0.9 % (FLUSH) 0.9 %
5-40 SYRINGE (ML) INJECTION PRN
OUTPATIENT
Start: 2023-12-14

## 2023-12-14 NOTE — PROGRESS NOTES
[address] [place] [address]  [phone]    Follow up Note      Particia [place]     Date of Visit:  12/14/2023    CC:  Patient presents for follow up   Chief Complaint   Patient presents with    Follow-up     Repeat bilateral L3,4,5 medial branch block. HPI:    Pain is better. Appropriate analgesia with current medications regimen: yes - fair. Change in quality of symptoms:no. Medication side effects:none. Recent diagnostic testing:none  Recent interventional procedures:Repeat Bilateral L3,4,5 medial branch block with more than 75-80% improvement in her pain for more than 5 days. She has not been on anticoagulation medications to include none. The patient  has not been on herbal supplements. The patient is diabetic. Imaging:   Lumbar spine MRI 2020:  L1/L2   There is normal alignment and vertebral body height. The disc space   is normal. There is no evidence of canal or foraminal narrowing. There is no evidence of bulging or herniated disc. L2/L3   There is normal alignment and vertebral body height. The disc space   is normal. There is no evidence of canal or foraminal narrowing. There is no evidence of bulging or herniated disc. L3/L4   Asymmetrical bulging disc to the left with circumferentially oriented   annular tear. No canal or foraminal narrowing. L4/L5   Trace spondylolisthesis and facet hypertrophy without canal stenosis. Mild bilateral foraminal narrowing. L5/S1   Asymmetrical bulging disc and/or osteophyte formation toward the   left. Bilateral facet hypertrophy. No measurable canal stenosis. Bilateral foraminal narrowing worse on the left than the right. Thoracic xray  Multilevel degenerative changes. Scoliosis. Cervical spine Xray   Vertebrae:  Dextroscoliosis which is unchanged. Multilevel degenerative  joint disease within the facets.   Mild degenerative anterolisthesis C3 on C4  which

## 2023-12-21 ENCOUNTER — PREP FOR PROCEDURE (OUTPATIENT)
Dept: PAIN MANAGEMENT | Age: 68
End: 2023-12-21

## 2023-12-21 ENCOUNTER — ANESTHESIA EVENT (OUTPATIENT)
Dept: OPERATING ROOM | Age: 68
End: 2023-12-21
Payer: MEDICARE

## 2023-12-28 ENCOUNTER — HOSPITAL ENCOUNTER (OUTPATIENT)
Dept: OPERATING ROOM | Age: 68
Setting detail: OUTPATIENT SURGERY
Discharge: HOME | End: 2023-12-28
Attending: PAIN MEDICINE
Payer: MEDICARE

## 2023-12-28 ENCOUNTER — ANESTHESIA (OUTPATIENT)
Dept: OPERATING ROOM | Age: 68
End: 2023-12-28
Payer: MEDICARE

## 2023-12-28 ENCOUNTER — HOSPITAL ENCOUNTER (OUTPATIENT)
Age: 68
Setting detail: OUTPATIENT SURGERY
Discharge: HOME | End: 2023-12-28
Attending: PAIN MEDICINE | Admitting: PAIN MEDICINE
Payer: MEDICARE

## 2023-12-28 VITALS
SYSTOLIC BLOOD PRESSURE: 142 MMHG | HEART RATE: 86 BPM | DIASTOLIC BLOOD PRESSURE: 71 MMHG | TEMPERATURE: 96.3 F | OXYGEN SATURATION: 94 % | HEIGHT: 60 IN | RESPIRATION RATE: 18 BRPM | BODY MASS INDEX: 33.81 KG/M2 | WEIGHT: 172.2 LBS

## 2023-12-28 DIAGNOSIS — M47.896 OTHER OSTEOARTHRITIS OF SPINE, LUMBAR REGION: ICD-10-CM

## 2023-12-28 LAB — GLUCOSE BLD-MCNC: 135 MG/DL (ref 74–99)

## 2023-12-28 PROCEDURE — 3600000015 HC SURGERY LEVEL 5 ADDTL 15MIN: Performed by: PAIN MEDICINE

## 2023-12-28 PROCEDURE — 64635 DESTROY LUMB/SAC FACET JNT: CPT | Performed by: PAIN MEDICINE

## 2023-12-28 PROCEDURE — 2709999900 HC NON-CHARGEABLE SUPPLY: Performed by: PAIN MEDICINE

## 2023-12-28 PROCEDURE — 2500000003 HC RX 250 WO HCPCS: Performed by: PAIN MEDICINE

## 2023-12-28 PROCEDURE — 3600000005 HC SURGERY LEVEL 5 BASE: Performed by: PAIN MEDICINE

## 2023-12-28 PROCEDURE — 6360000002 HC RX W HCPCS: Performed by: PAIN MEDICINE

## 2023-12-28 PROCEDURE — 3700000000 HC ANESTHESIA ATTENDED CARE: Performed by: PAIN MEDICINE

## 2023-12-28 PROCEDURE — 7100000011 HC PHASE II RECOVERY - ADDTL 15 MIN: Performed by: PAIN MEDICINE

## 2023-12-28 PROCEDURE — 7100000010 HC PHASE II RECOVERY - FIRST 15 MIN: Performed by: PAIN MEDICINE

## 2023-12-28 PROCEDURE — 6360000002 HC RX W HCPCS: Performed by: NURSE ANESTHETIST, CERTIFIED REGISTERED

## 2023-12-28 PROCEDURE — 82962 GLUCOSE BLOOD TEST: CPT

## 2023-12-28 PROCEDURE — 3700000001 HC ADD 15 MINUTES (ANESTHESIA): Performed by: PAIN MEDICINE

## 2023-12-28 PROCEDURE — 2580000003 HC RX 258: Performed by: ANESTHESIOLOGY

## 2023-12-28 PROCEDURE — 64636 DESTROY L/S FACET JNT ADDL: CPT | Performed by: PAIN MEDICINE

## 2023-12-28 RX ORDER — MEPERIDINE HYDROCHLORIDE 25 MG/ML
12.5 INJECTION INTRAMUSCULAR; INTRAVENOUS; SUBCUTANEOUS ONCE
Status: CANCELLED | OUTPATIENT
Start: 2023-12-28

## 2023-12-28 RX ORDER — FENTANYL CITRATE 0.05 MG/ML
50 INJECTION, SOLUTION INTRAMUSCULAR; INTRAVENOUS EVERY 5 MIN PRN
Status: CANCELLED | OUTPATIENT
Start: 2023-12-28

## 2023-12-28 RX ORDER — FENTANYL CITRATE 50 UG/ML
INJECTION, SOLUTION INTRAMUSCULAR; INTRAVENOUS PRN
Status: DISCONTINUED | OUTPATIENT
Start: 2023-12-28 | End: 2023-12-28 | Stop reason: SDUPTHER

## 2023-12-28 RX ORDER — SODIUM CHLORIDE 0.9 % (FLUSH) 0.9 %
5-40 SYRINGE (ML) INJECTION PRN
Status: DISCONTINUED | OUTPATIENT
Start: 2023-12-28 | End: 2023-12-28 | Stop reason: HOSPADM

## 2023-12-28 RX ORDER — DROPERIDOL 2.5 MG/ML
0.62 INJECTION, SOLUTION INTRAMUSCULAR; INTRAVENOUS
Status: CANCELLED | OUTPATIENT
Start: 2023-12-28 | End: 2023-12-29

## 2023-12-28 RX ORDER — MIDAZOLAM HYDROCHLORIDE 1 MG/ML
INJECTION INTRAMUSCULAR; INTRAVENOUS PRN
Status: DISCONTINUED | OUTPATIENT
Start: 2023-12-28 | End: 2023-12-28 | Stop reason: SDUPTHER

## 2023-12-28 RX ORDER — LIDOCAINE HYDROCHLORIDE 20 MG/ML
INJECTION, SOLUTION EPIDURAL; INFILTRATION; INTRACAUDAL; PERINEURAL PRN
Status: DISCONTINUED | OUTPATIENT
Start: 2023-12-28 | End: 2023-12-28 | Stop reason: ALTCHOICE

## 2023-12-28 RX ORDER — SODIUM CHLORIDE, SODIUM LACTATE, POTASSIUM CHLORIDE, CALCIUM CHLORIDE 600; 310; 30; 20 MG/100ML; MG/100ML; MG/100ML; MG/100ML
INJECTION, SOLUTION INTRAVENOUS CONTINUOUS
Status: DISCONTINUED | OUTPATIENT
Start: 2023-12-28 | End: 2023-12-28 | Stop reason: HOSPADM

## 2023-12-28 RX ORDER — SODIUM CHLORIDE 9 MG/ML
INJECTION, SOLUTION INTRAVENOUS PRN
Status: CANCELLED | OUTPATIENT
Start: 2023-12-28

## 2023-12-28 RX ORDER — DIPHENHYDRAMINE HYDROCHLORIDE 50 MG/ML
12.5 INJECTION INTRAMUSCULAR; INTRAVENOUS
Status: CANCELLED | OUTPATIENT
Start: 2023-12-28 | End: 2023-12-29

## 2023-12-28 RX ORDER — SODIUM CHLORIDE 0.9 % (FLUSH) 0.9 %
5-40 SYRINGE (ML) INJECTION PRN
Status: CANCELLED | OUTPATIENT
Start: 2023-12-28

## 2023-12-28 RX ORDER — SODIUM CHLORIDE 9 MG/ML
INJECTION, SOLUTION INTRAVENOUS PRN
Status: DISCONTINUED | OUTPATIENT
Start: 2023-12-28 | End: 2023-12-28 | Stop reason: HOSPADM

## 2023-12-28 RX ORDER — SODIUM CHLORIDE 0.9 % (FLUSH) 0.9 %
5-40 SYRINGE (ML) INJECTION EVERY 12 HOURS SCHEDULED
Status: DISCONTINUED | OUTPATIENT
Start: 2023-12-28 | End: 2023-12-28 | Stop reason: HOSPADM

## 2023-12-28 RX ORDER — SODIUM CHLORIDE 0.9 % (FLUSH) 0.9 %
5-40 SYRINGE (ML) INJECTION EVERY 12 HOURS SCHEDULED
Status: CANCELLED | OUTPATIENT
Start: 2023-12-28

## 2023-12-28 RX ORDER — MORPHINE SULFATE 2 MG/ML
1 INJECTION, SOLUTION INTRAMUSCULAR; INTRAVENOUS EVERY 5 MIN PRN
Status: CANCELLED | OUTPATIENT
Start: 2023-12-28

## 2023-12-28 RX ADMIN — MIDAZOLAM 2 MG: 1 INJECTION INTRAMUSCULAR; INTRAVENOUS at 10:37

## 2023-12-28 RX ADMIN — FENTANYL CITRATE 50 MCG: 50 INJECTION INTRAMUSCULAR; INTRAVENOUS at 10:38

## 2023-12-28 RX ADMIN — FENTANYL CITRATE 25 MCG: 50 INJECTION INTRAMUSCULAR; INTRAVENOUS at 10:41

## 2023-12-28 RX ADMIN — FENTANYL CITRATE 25 MCG: 50 INJECTION INTRAMUSCULAR; INTRAVENOUS at 10:44

## 2023-12-28 RX ADMIN — SODIUM CHLORIDE, POTASSIUM CHLORIDE, SODIUM LACTATE AND CALCIUM CHLORIDE: 600; 310; 30; 20 INJECTION, SOLUTION INTRAVENOUS at 10:25

## 2023-12-28 NOTE — OP NOTE
2023    Patient: Camilo Deras  :  1955  Age:  79 y.o. Sex:  female     PRE-OPERATIVE DIAGNOSIS: Bilateral Lumbar spondylosis, lumbar facet arthropathy. POST-OPERATIVE DIAGNOSIS: Same. PROCEDURE:  Fluoroscopic-guided Bilateral  Lumbar facet medial branch radiofrequency ablation at levels L3,4,5 MB     SURGEON:  PAZ Jaramillo M.D. ANESTHESIA: MAC    ESTIMATED BLOOD LOSS: None.  ______________________________________________________________________  HISTORY & PHYSICAL: Camilo Deras presents today for fluoroscopic-guided Bilateral lumbar facet medial branch radiofrequency ablation at levels L3,4,5 MB. The potential complications of the procedure were explained to Juan Rolo again today and she has elected to undergo the aforementioned procedure. Vern complete History & Physical examination were reviewed in depth, a copy of which is in the chart. DESCRIPTION OF PROCEDURE:    After confirming written and informed consent, a time-out was performed and Vern name and date of birth, the procedure to be performed as well as the plan for the location of the needle insertion were confirmed. The patient was brought into the procedure room and placed in the prone position on the fluoroscopy table. Standard monitors were placed and vital signs were observed throughout the procedure. The area of the lumbar spine and upper buttocks was sterilely prepped with chloraprep and draped in a sterile manner. AP fluoroscopy was used to identify and jeet bartons point at the targeted area. A 22 gauge 10 mm radiofrequency probe was advanced toward each of these points. Once bone was contacted, negative aspiration for blood and CSF was confirmed, sensory stimulation was performed at 50 Hz and at 0.4 volts generating a pressure sensation. Motor stimulation < 2.0 volts elicited multifidus twitching without any radicular symptoms.  1 ml of 2% lidocaine was injected prior to lesioning, which

## 2023-12-28 NOTE — ANESTHESIA POSTPROCEDURE EVALUATION
Department of Anesthesiology  Postprocedure Note    Patient: Clementine Conrad  MRN: [id number]  YOB: 1955  Date of evaluation: 12/28/2023    Procedure Summary       Date: 12/28/23 Room / Location: [address] [place] [address] 04 / [address] [address]    Anesthesia Start: 0816 Anesthesia Stop: 1110    Procedure: Bilateral L3,4,5 medial branch radiofrequency ablation SEDATION (Bilateral) Diagnosis:       Lumbar spondylosis      (Lumbar spondylosis [I34.574])    Surgeons: Juan Nick MD Responsible Provider: Amadou Pruitt MD    Anesthesia Type: MAC ASA Status: 3            Anesthesia Type: No value filed. Margi Phase I: Margi Score: 10    Margi Phase II: Mragi Score: 10    Anesthesia Post Evaluation    Patient location during evaluation: PACU  Patient participation: complete - patient participated  Level of consciousness: awake and alert  Airway patency: patent  Nausea & Vomiting: no nausea and no vomiting  Cardiovascular status: hemodynamically stable  Respiratory status: room air and spontaneous ventilation  Hydration status: stable  Pain management: satisfactory to patient    No notable events documented.

## 2024-01-15 ASSESSMENT — PATIENT HEALTH QUESTIONNAIRE - PHQ9
SUM OF ALL RESPONSES TO PHQ QUESTIONS 1-9: 2
1. LITTLE INTEREST OR PLEASURE IN DOING THINGS: 0
SUM OF ALL RESPONSES TO PHQ QUESTIONS 1-9: 2
2. FEELING DOWN, DEPRESSED OR HOPELESS: NOT AT ALL
3. TROUBLE FALLING OR STAYING ASLEEP: 1
7. TROUBLE CONCENTRATING ON THINGS, SUCH AS READING THE NEWSPAPER OR WATCHING TELEVISION: NOT AT ALL
6. FEELING BAD ABOUT YOURSELF - OR THAT YOU ARE A FAILURE OR HAVE LET YOURSELF OR YOUR FAMILY DOWN: NOT AT ALL
9. THOUGHTS THAT YOU WOULD BE BETTER OFF DEAD, OR OF HURTING YOURSELF: 0
3. TROUBLE FALLING OR STAYING ASLEEP: SEVERAL DAYS
1. LITTLE INTEREST OR PLEASURE IN DOING THINGS: NOT AT ALL
2. FEELING DOWN, DEPRESSED OR HOPELESS: 0
8. MOVING OR SPEAKING SO SLOWLY THAT OTHER PEOPLE COULD HAVE NOTICED. OR THE OPPOSITE, BEING SO FIGETY OR RESTLESS THAT YOU HAVE BEEN MOVING AROUND A LOT MORE THAN USUAL: 0
4. FEELING TIRED OR HAVING LITTLE ENERGY: SEVERAL DAYS
SUM OF ALL RESPONSES TO PHQ9 QUESTIONS 1 & 2: 0
8. MOVING OR SPEAKING SO SLOWLY THAT OTHER PEOPLE COULD HAVE NOTICED. OR THE OPPOSITE - BEING SO FIDGETY OR RESTLESS THAT YOU HAVE BEEN MOVING AROUND A LOT MORE THAN USUAL: NOT AT ALL
SUM OF ALL RESPONSES TO PHQ QUESTIONS 1-9: 2
SUM OF ALL RESPONSES TO PHQ QUESTIONS 1-9: 2
7. TROUBLE CONCENTRATING ON THINGS, SUCH AS READING THE NEWSPAPER OR WATCHING TELEVISION: 0
10. IF YOU CHECKED OFF ANY PROBLEMS, HOW DIFFICULT HAVE THESE PROBLEMS MADE IT FOR YOU TO DO YOUR WORK, TAKE CARE OF THINGS AT HOME, OR GET ALONG WITH OTHER PEOPLE: NOT DIFFICULT AT ALL
SUM OF ALL RESPONSES TO PHQ QUESTIONS 1-9: 2
4. FEELING TIRED OR HAVING LITTLE ENERGY: 1
5. POOR APPETITE OR OVEREATING: NOT AT ALL
9. THOUGHTS THAT YOU WOULD BE BETTER OFF DEAD, OR OF HURTING YOURSELF: NOT AT ALL
5. POOR APPETITE OR OVEREATING: 0
6. FEELING BAD ABOUT YOURSELF - OR THAT YOU ARE A FAILURE OR HAVE LET YOURSELF OR YOUR FAMILY DOWN: 0
10. IF YOU CHECKED OFF ANY PROBLEMS, HOW DIFFICULT HAVE THESE PROBLEMS MADE IT FOR YOU TO DO YOUR WORK, TAKE CARE OF THINGS AT HOME, OR GET ALONG WITH OTHER PEOPLE: 0

## 2024-01-18 ENCOUNTER — OFFICE VISIT (OUTPATIENT)
Dept: PRIMARY CARE CLINIC | Age: 69
End: 2024-01-18
Payer: MEDICARE

## 2024-01-18 VITALS
WEIGHT: 168 LBS | BODY MASS INDEX: 32.98 KG/M2 | TEMPERATURE: 98.2 F | SYSTOLIC BLOOD PRESSURE: 120 MMHG | HEART RATE: 100 BPM | DIASTOLIC BLOOD PRESSURE: 80 MMHG | OXYGEN SATURATION: 97 % | HEIGHT: 60 IN

## 2024-01-18 DIAGNOSIS — E03.9 ACQUIRED HYPOTHYROIDISM: ICD-10-CM

## 2024-01-18 DIAGNOSIS — Z00.00 MEDICARE ANNUAL WELLNESS VISIT, SUBSEQUENT: Primary | ICD-10-CM

## 2024-01-18 DIAGNOSIS — E11.9 TYPE 2 DIABETES MELLITUS WITHOUT COMPLICATION, WITHOUT LONG-TERM CURRENT USE OF INSULIN (HCC): ICD-10-CM

## 2024-01-18 DIAGNOSIS — C77.3 CARCINOMA OF RIGHT BREAST METASTATIC TO AXILLARY LYMPH NODE (HCC): ICD-10-CM

## 2024-01-18 DIAGNOSIS — C50.911 CARCINOMA OF RIGHT BREAST METASTATIC TO AXILLARY LYMPH NODE (HCC): ICD-10-CM

## 2024-01-18 LAB — HBA1C MFR BLD: 7.1 %

## 2024-01-18 PROCEDURE — 3051F HG A1C>EQUAL 7.0%<8.0%: CPT | Performed by: FAMILY MEDICINE

## 2024-01-18 PROCEDURE — 1123F ACP DISCUSS/DSCN MKR DOCD: CPT | Performed by: FAMILY MEDICINE

## 2024-01-18 PROCEDURE — 83036 HEMOGLOBIN GLYCOSYLATED A1C: CPT | Performed by: FAMILY MEDICINE

## 2024-01-18 PROCEDURE — G0439 PPPS, SUBSEQ VISIT: HCPCS | Performed by: FAMILY MEDICINE

## 2024-01-18 RX ORDER — TRAMADOL HYDROCHLORIDE 50 MG/1
50 TABLET ORAL EVERY 6 HOURS PRN
Status: CANCELLED | OUTPATIENT
Start: 2024-01-18

## 2024-01-18 RX ORDER — SEMAGLUTIDE 1.34 MG/ML
0.5 INJECTION, SOLUTION SUBCUTANEOUS WEEKLY
Qty: 3 ADJUSTABLE DOSE PRE-FILLED PEN SYRINGE | Refills: 5 | Status: SHIPPED | OUTPATIENT
Start: 2024-01-18

## 2024-01-18 ASSESSMENT — LIFESTYLE VARIABLES
HOW MANY STANDARD DRINKS CONTAINING ALCOHOL DO YOU HAVE ON A TYPICAL DAY: 1 OR 2
HOW OFTEN DO YOU HAVE A DRINK CONTAINING ALCOHOL: MONTHLY OR LESS

## 2024-01-18 NOTE — PATIENT INSTRUCTIONS
Manage other health problems such as diabetes, high blood pressure, and high cholesterol. If you think you may have a problem with alcohol or drug use, talk to your doctor.   Medicines    Take your medicines exactly as prescribed. Call your doctor if you think you are having a problem with your medicine.     If your doctor recommends aspirin, take the amount directed each day. Make sure you take aspirin and not another kind of pain reliever, such as acetaminophen (Tylenol).   When should you call for help?   Call 911 if you have symptoms of a heart attack. These may include:    Chest pain or pressure, or a strange feeling in the chest.     Sweating.     Shortness of breath.     Pain, pressure, or a strange feeling in the back, neck, jaw, or upper belly or in one or both shoulders or arms.     Lightheadedness or sudden weakness.     A fast or irregular heartbeat.   After you call 911, the  may tell you to chew 1 adult-strength or 2 to 4 low-dose aspirin. Wait for an ambulance. Do not try to drive yourself.  Watch closely for changes in your health, and be sure to contact your doctor if you have any problems.  Where can you learn more?  Go to https://www.[employer].net/patientEd and enter F075 to learn more about \"A Healthy Heart: Care Instructions.\"  Current as of: June 25, 2023               Content Version: 13.9  © 1011-7998 [employer].   Care instructions adapted under license by [employer]. If you have questions about a medical condition or this instruction, always ask your healthcare professional. [employer] disclaims any warranty or liability for your use of this information.      Personalized Preventive Plan for Gabi Orourke - 1/18/2024  Medicare offers a range of preventive health benefits. Some of the tests and screenings are paid in full while other may be subject to a deductible, co-insurance, and/or copay.    Some of these benefits include a comprehensive

## 2024-01-18 NOTE — PROGRESS NOTES
months.    CareTeam (Including outside providers/suppliers regularly involved in providing care):   Patient Care Team:  Davi Horton DO as PCP - General  Davi Horton DO as PCP - Empaneled Provider  Natalie Herrmann MD as Obstetrician (Obstetrics & Gynecology)  Yuli Gomez MD as Surgeon (General Surgery)     Reviewed and updated this visit:  Tobacco  Allergies  Meds  Problems  Med Hx  Surg Hx  Soc Hx  Fam Hx

## 2024-01-22 PROBLEM — M54.17 LUMBOSACRAL NEURITIS: Status: ACTIVE | Noted: 2023-09-02

## 2024-01-22 PROBLEM — Z17.0 ESTROGEN RECEPTOR POSITIVE STATUS (ER+): Status: ACTIVE | Noted: 2022-10-04

## 2024-01-22 PROBLEM — R59.0 AXILLARY LYMPHADENOPATHY: Status: ACTIVE | Noted: 2023-09-02

## 2024-01-22 PROBLEM — M85.80 OSTEOPENIA: Status: ACTIVE | Noted: 2022-10-28

## 2024-01-24 ENCOUNTER — OFFICE VISIT (OUTPATIENT)
Dept: PAIN MANAGEMENT | Age: 69
End: 2024-01-24
Payer: MEDICARE

## 2024-01-24 VITALS
RESPIRATION RATE: 100 BRPM | HEART RATE: 94 BPM | SYSTOLIC BLOOD PRESSURE: 128 MMHG | TEMPERATURE: 95.9 F | HEIGHT: 66 IN | BODY MASS INDEX: 27 KG/M2 | WEIGHT: 168 LBS | DIASTOLIC BLOOD PRESSURE: 82 MMHG

## 2024-01-24 DIAGNOSIS — M47.894 THORACIC FACET JOINT SYNDROME: ICD-10-CM

## 2024-01-24 DIAGNOSIS — M47.816 LUMBAR SPONDYLOSIS: ICD-10-CM

## 2024-01-24 DIAGNOSIS — G89.4 CHRONIC PAIN SYNDROME: ICD-10-CM

## 2024-01-24 DIAGNOSIS — M47.812 CERVICAL FACET JOINT SYNDROME: ICD-10-CM

## 2024-01-24 DIAGNOSIS — M51.9 LUMBAR DISC DISORDER: ICD-10-CM

## 2024-01-24 DIAGNOSIS — M50.90 CERVICAL DISC DISORDER: ICD-10-CM

## 2024-01-24 DIAGNOSIS — M51.9 THORACIC DISC DISEASE: ICD-10-CM

## 2024-01-24 DIAGNOSIS — M47.814 THORACIC SPONDYLOSIS: ICD-10-CM

## 2024-01-24 DIAGNOSIS — M47.812 CERVICAL SPONDYLOSIS: Primary | ICD-10-CM

## 2024-01-24 DIAGNOSIS — M41.9 SCOLIOSIS OF THORACIC SPINE, UNSPECIFIED SCOLIOSIS TYPE: ICD-10-CM

## 2024-01-24 DIAGNOSIS — Z79.891 ENCOUNTER FOR LONG-TERM OPIATE ANALGESIC USE: ICD-10-CM

## 2024-01-24 PROCEDURE — 99214 OFFICE O/P EST MOD 30 MIN: CPT

## 2024-01-24 PROCEDURE — 1123F ACP DISCUSS/DSCN MKR DOCD: CPT | Performed by: PAIN MEDICINE

## 2024-01-24 PROCEDURE — 99214 OFFICE O/P EST MOD 30 MIN: CPT | Performed by: PAIN MEDICINE

## 2024-01-24 RX ORDER — MELOXICAM 15 MG/1
15 TABLET ORAL DAILY PRN
Qty: 30 TABLET | Refills: 0 | Status: SHIPPED | OUTPATIENT
Start: 2024-01-24 | End: 2024-02-23

## 2024-01-24 NOTE — PROGRESS NOTES
Gabi Orourke presents to the [facility] on 1/24/2024. Gabi is complaining of pain mid back . The pain is persistent. The pain is described as shooting. Pain is rated on her best day at a 4, on her worst day at a 6, and on average at a 10 on the VAS scale. She took her last dose of Tramadol couple weeks.      Any procedures since your last visit: No    She is  on NSAIDS and  is not on anticoagulation medications to include none    Pacemaker or defibrillator: No     Medication Contract and Consent for Opioid Use Documents Filed        No documents found                       /82   Pulse 94   Temp (!) 95.9 °F (35.5 °C)   Resp (!) 100   Ht 1.676 m (5' 6\")   Wt 76.2 kg (168 lb)   BMI 27.12 kg/m²      No LMP recorded. Patient has had a hysterectomy.

## 2024-01-24 NOTE — PROGRESS NOTES
[facility]  [address] [place] [address]  [phone]    Follow up Note      Gabi Orourke     Date of Visit:  1/24/2024    CC:  Patient presents for follow up   No chief complaint on file.      HPI:    Pain is slightly better  Appropriate analgesia with current medications regimen:N/A    Change in quality of symptoms:no.    Medication side effects:not applicable   Recent diagnostic testing:none  Recent interventional procedures: Bilateral L3,4,5 medial branch RFA with less than expected response.    She has not been on anticoagulation medications to include none. The patient  has not been on herbal supplements.  The patient is diabetic.     Imaging:   Lumbar spine MRI 2020:  L1/L2   There is normal alignment and vertebral body height. The disc space   is normal. There is no evidence of canal or foraminal narrowing.   There is no evidence of bulging or herniated disc.   L2/L3   There is normal alignment and vertebral body height. The disc space   is normal. There is no evidence of canal or foraminal narrowing.   There is no evidence of bulging or herniated disc.   L3/L4   Asymmetrical bulging disc to the left with circumferentially oriented   annular tear. No canal or foraminal narrowing.   L4/L5   Trace spondylolisthesis and facet hypertrophy without canal stenosis.   Mild bilateral foraminal narrowing.   L5/S1   Asymmetrical bulging disc and/or osteophyte formation toward the   left. Bilateral facet hypertrophy. No measurable canal stenosis.   Bilateral foraminal narrowing worse on the left than the right.     Thoracic xray  Multilevel degenerative changes.  Scoliosis.    Cervical spine Xray   Vertebrae:  Dextroscoliosis which is unchanged.  Multilevel degenerative  joint disease within the facets.  Mild degenerative anterolisthesis C3 on C4  which has developed since the prior study.  Mild degenerative retrolisthesis  of C6 on C7 which has developed since

## 2024-01-25 LAB
6-MAM, QUANTITATIVE, URINE: <10 NG/ML
6-MONOACETYLMORPHINE, URINE: NEGATIVE
7-AMINOCLONAZEPAM, QUANTITATIVE, URINE: <50 NG/ML
ABNORMAL SPECIMEN VALIDITY TEST: ABNORMAL
ALCOHOL URINE: NOT DETECTED MG/DL
ALPHA-HYDROXYALPRAZOLAM, QUANTITATIVE, URINE: <50 NG/ML
ALPHA-HYDROXYMIDAZOLAM, QUANTITATIVE, URINE: <50 NG/ML
ALPHA-HYDROXYTRIAZOLAM, QUANTITATIVE, URINE: <50 NG/ML
ALPRAZOLAM URINE QUANT: <50 NG/ML
AMPHETAMINE SCREEN URINE: NEGATIVE
BARBITURATE SCREEN URINE: NEGATIVE
BENZODIAZEPINE SCREEN, URINE: NEGATIVE
BUPRENORPHINE URINE: NEGATIVE
CANNABINOID SCREEN URINE: NEGATIVE
CHLORDIAZEPOXIDE, QUANTITATIVE, URINE: <50 NG/ML
CLONAZEPAM, QUANTITATIVE, URINE: <50 NG/ML
COCAINE METABOLITE, URINE: NEGATIVE
CODEINE, QUANTITATIVE, URINE: <50 NG/ML
COMPLIANCE DRUG ANALYSIS, URINE: NORMAL
DIAZEPAM URINE QUANT: <50 NG/ML
FENTANYL URINE: NEGATIVE
FLUNITRAZEPAM, QUANTITATIVE, URINE: <50 NG/ML
FLURAZEPAM, QUANTITATIVE, URINE: <50 NG/ML
HYDROCODONE, QUANTITATIVE, URINE: >1000 NG/ML
HYDROMORPHONE, QUANTITATIVE, URINE: 178.6 NG/ML
INTEGRITY CHECK, CREATININE, URINE: 70.9 MG/DL (ref 22–250)
INTEGRITY CHECK, OXIDANT, URINE: <40 MG/L
INTEGRITY CHECK, PH, URINE: 5.7 (ref 4.5–9)
INTEGRITY CHECK, SPECIFIC GRAVITY, URINE: 1.01 (ref 1–1.03)
LORAZEPAM URINE QUANT: <50 NG/ML
METHADONE SCREEN, URINE: NEGATIVE
MIDAZOLAM URINE QUANT: <50 NG/ML
MORPHINE, QUANTITATIVE, URINE: <50 NG/ML
NORDIAZEPAM URINE QUANT: <50 NG/ML
NORHYDROCODONE, QUANTITATIVE, URINE: >1000 NG/ML
NOROXYCODONE, QUANTITATIVE, URINE: <50 NG/ML
OPIATES, URINE: POSITIVE
OXAZEPAM URINE QUANT: <50 NG/ML
OXYCODONE SCREEN URINE: NEGATIVE
OXYCODONE URINE, QUANTITATIVE: <50 NG/ML
OXYMORPHONE, QUANTITATIVE, URINE: <50 NG/ML
PHENCYCLIDINE, URINE: NEGATIVE
TEMAZEPAM, QUANTITATIVE, URINE: <50 NG/ML
TEST INFORMATION: ABNORMAL
TRAMADOL, URINE: NEGATIVE

## 2024-02-21 ENCOUNTER — OFFICE VISIT (OUTPATIENT)
Dept: PAIN MANAGEMENT | Age: 69
End: 2024-02-21
Payer: MEDICARE

## 2024-02-21 VITALS
SYSTOLIC BLOOD PRESSURE: 140 MMHG | BODY MASS INDEX: 27 KG/M2 | HEIGHT: 66 IN | TEMPERATURE: 96.9 F | OXYGEN SATURATION: 92 % | RESPIRATION RATE: 18 BRPM | WEIGHT: 168 LBS | HEART RATE: 105 BPM | DIASTOLIC BLOOD PRESSURE: 70 MMHG

## 2024-02-21 DIAGNOSIS — M51.9 THORACIC DISC DISEASE: ICD-10-CM

## 2024-02-21 DIAGNOSIS — G89.4 CHRONIC PAIN SYNDROME: Primary | ICD-10-CM

## 2024-02-21 DIAGNOSIS — M47.812 CERVICAL SPONDYLOSIS: ICD-10-CM

## 2024-02-21 DIAGNOSIS — M47.814 THORACIC SPONDYLOSIS: ICD-10-CM

## 2024-02-21 DIAGNOSIS — M41.9 SCOLIOSIS OF THORACIC SPINE, UNSPECIFIED SCOLIOSIS TYPE: ICD-10-CM

## 2024-02-21 DIAGNOSIS — M51.9 LUMBAR DISC DISORDER: ICD-10-CM

## 2024-02-21 DIAGNOSIS — M47.894 THORACIC FACET JOINT SYNDROME: ICD-10-CM

## 2024-02-21 DIAGNOSIS — Z79.891 ENCOUNTER FOR LONG-TERM OPIATE ANALGESIC USE: ICD-10-CM

## 2024-02-21 DIAGNOSIS — M47.816 LUMBAR SPONDYLOSIS: ICD-10-CM

## 2024-02-21 DIAGNOSIS — M50.90 CERVICAL DISC DISORDER: ICD-10-CM

## 2024-02-21 DIAGNOSIS — M47.812 CERVICAL FACET JOINT SYNDROME: ICD-10-CM

## 2024-02-21 PROCEDURE — 1123F ACP DISCUSS/DSCN MKR DOCD: CPT | Performed by: PAIN MEDICINE

## 2024-02-21 PROCEDURE — 99213 OFFICE O/P EST LOW 20 MIN: CPT | Performed by: PAIN MEDICINE

## 2024-02-21 PROCEDURE — 99214 OFFICE O/P EST MOD 30 MIN: CPT | Performed by: PAIN MEDICINE

## 2024-02-21 RX ORDER — MELOXICAM 15 MG/1
15 TABLET ORAL DAILY PRN
Qty: 30 TABLET | Refills: 0 | Status: SHIPPED | OUTPATIENT
Start: 2024-02-21 | End: 2024-03-22

## 2024-02-21 RX ORDER — HYDROCODONE BITARTRATE AND ACETAMINOPHEN 5; 325 MG/1; MG/1
1 TABLET ORAL DAILY PRN
Qty: 30 TABLET | Refills: 0 | Status: SHIPPED | OUTPATIENT
Start: 2024-02-21 | End: 2024-03-22

## 2024-02-21 RX ORDER — MELOXICAM 15 MG/1
15 TABLET ORAL DAILY PRN
Qty: 30 TABLET | Refills: 0 | OUTPATIENT
Start: 2024-02-21

## 2024-02-21 NOTE — PROGRESS NOTES
[facility]  [address] [place] [address]  [phone]    Follow up Note      Gabi Orourke     Date of Visit:  2/21/2024    CC:  Patient presents for follow up   Chief Complaint   Patient presents with    Follow-up     Bilateral L3,4,5 medial branch radiofrequency ablation       HPI:    Pain is slightly better.  Appropriate analgesia with current medications regimen:yes    Change in quality of symptoms:no.    Medication side effects:none  Recent diagnostic testing:none  Recent interventional procedures: none    She has not been on anticoagulation medications to include none. The patient  has not been on herbal supplements.  The patient is diabetic.     Imaging:   Lumbar spine MRI 2020:  L1/L2   There is normal alignment and vertebral body height. The disc space   is normal. There is no evidence of canal or foraminal narrowing.   There is no evidence of bulging or herniated disc.   L2/L3   There is normal alignment and vertebral body height. The disc space   is normal. There is no evidence of canal or foraminal narrowing.   There is no evidence of bulging or herniated disc.   L3/L4   Asymmetrical bulging disc to the left with circumferentially oriented   annular tear. No canal or foraminal narrowing.   L4/L5   Trace spondylolisthesis and facet hypertrophy without canal stenosis.   Mild bilateral foraminal narrowing.   L5/S1   Asymmetrical bulging disc and/or osteophyte formation toward the   left. Bilateral facet hypertrophy. No measurable canal stenosis.   Bilateral foraminal narrowing worse on the left than the right.     Thoracic xray  Multilevel degenerative changes.  Scoliosis.    Cervical spine Xray   Vertebrae:  Dextroscoliosis which is unchanged.  Multilevel degenerative  joint disease within the facets.  Mild degenerative anterolisthesis C3 on C4  which has developed since the prior study.  Mild degenerative retrolisthesis  of C6 on C7 which has

## 2024-02-21 NOTE — PROGRESS NOTES
Gabi Orourke presents to the [facility] on 2/21/2024. Gabi is complaining of pain back. The pain is constant. The pain is described as aching, throbbing, sharp, burning, and penetrating. Pain is rated on her best day at a 5, on her worst day at a 10, and on average at a 7 on the VAS scale. She took her last dose of Mobic and Aleve  today.      Any procedures since your last visit: Yes, Bilateral L3,4,5 medial branch radiofrequency ablation   She is  on NSAIDS and  is not on anticoagulation medications to include none   Pacemaker or defibrillator: No   Medication Contract and Consent for Opioid Use Documents Filed        No documents found                       There were no vitals taken for this visit.     No LMP recorded. Patient has had a hysterectomy.

## 2024-03-01 ENCOUNTER — HOSPITAL ENCOUNTER (OUTPATIENT)
Dept: RADIOLOGY | Facility: CLINIC | Age: 69
Discharge: HOME | End: 2024-03-01
Payer: MEDICARE

## 2024-03-01 DIAGNOSIS — Z78.0 MENOPAUSE: ICD-10-CM

## 2024-03-01 DIAGNOSIS — M85.80 OSTEOPENIA, UNSPECIFIED LOCATION: ICD-10-CM

## 2024-03-01 DIAGNOSIS — Z51.81 ENCOUNTER FOR MONITORING ANASTROZOLE THERAPY: ICD-10-CM

## 2024-03-01 DIAGNOSIS — Z79.811 ENCOUNTER FOR MONITORING ANASTROZOLE THERAPY: ICD-10-CM

## 2024-03-01 DIAGNOSIS — C50.411 MALIGNANT NEOPLASM OF UPPER-OUTER QUADRANT OF RIGHT BREAST IN FEMALE, ESTROGEN RECEPTOR POSITIVE (MULTI): ICD-10-CM

## 2024-03-01 DIAGNOSIS — Z17.0 MALIGNANT NEOPLASM OF UPPER-OUTER QUADRANT OF RIGHT BREAST IN FEMALE, ESTROGEN RECEPTOR POSITIVE (MULTI): ICD-10-CM

## 2024-03-01 PROCEDURE — 77080 DXA BONE DENSITY AXIAL: CPT | Performed by: RADIOLOGY

## 2024-03-01 PROCEDURE — 77080 DXA BONE DENSITY AXIAL: CPT

## 2024-03-21 RX ORDER — MELOXICAM 15 MG/1
15 TABLET ORAL DAILY PRN
Qty: 30 TABLET | Refills: 0 | OUTPATIENT
Start: 2024-03-21

## 2024-04-03 ENCOUNTER — OFFICE VISIT (OUTPATIENT)
Dept: PAIN MANAGEMENT | Age: 69
End: 2024-04-03
Payer: MEDICARE

## 2024-04-03 VITALS
BODY MASS INDEX: 27 KG/M2 | DIASTOLIC BLOOD PRESSURE: 68 MMHG | TEMPERATURE: 96.9 F | WEIGHT: 168 LBS | RESPIRATION RATE: 18 BRPM | HEART RATE: 77 BPM | SYSTOLIC BLOOD PRESSURE: 110 MMHG | HEIGHT: 66 IN | OXYGEN SATURATION: 96 %

## 2024-04-03 DIAGNOSIS — M47.894 THORACIC FACET JOINT SYNDROME: ICD-10-CM

## 2024-04-03 DIAGNOSIS — M51.9 THORACIC DISC DISEASE: ICD-10-CM

## 2024-04-03 DIAGNOSIS — Z79.891 ENCOUNTER FOR LONG-TERM OPIATE ANALGESIC USE: ICD-10-CM

## 2024-04-03 DIAGNOSIS — M47.814 THORACIC SPONDYLOSIS: ICD-10-CM

## 2024-04-03 DIAGNOSIS — M47.816 LUMBAR SPONDYLOSIS: ICD-10-CM

## 2024-04-03 DIAGNOSIS — M50.90 CERVICAL DISC DISORDER: ICD-10-CM

## 2024-04-03 DIAGNOSIS — M51.9 LUMBAR DISC DISORDER: ICD-10-CM

## 2024-04-03 DIAGNOSIS — G89.4 CHRONIC PAIN SYNDROME: Primary | ICD-10-CM

## 2024-04-03 DIAGNOSIS — M54.16 LUMBAR RADICULOPATHY: ICD-10-CM

## 2024-04-03 DIAGNOSIS — M47.812 CERVICAL FACET JOINT SYNDROME: ICD-10-CM

## 2024-04-03 DIAGNOSIS — M41.9 SCOLIOSIS OF THORACIC SPINE, UNSPECIFIED SCOLIOSIS TYPE: ICD-10-CM

## 2024-04-03 DIAGNOSIS — M47.812 CERVICAL SPONDYLOSIS: ICD-10-CM

## 2024-04-03 PROCEDURE — 99213 OFFICE O/P EST LOW 20 MIN: CPT | Performed by: PAIN MEDICINE

## 2024-04-03 PROCEDURE — 99214 OFFICE O/P EST MOD 30 MIN: CPT | Performed by: PAIN MEDICINE

## 2024-04-03 PROCEDURE — 1123F ACP DISCUSS/DSCN MKR DOCD: CPT | Performed by: PAIN MEDICINE

## 2024-04-03 RX ORDER — MELOXICAM 15 MG/1
15 TABLET ORAL DAILY PRN
Qty: 30 TABLET | Refills: 0 | Status: SHIPPED | OUTPATIENT
Start: 2024-04-03 | End: 2024-05-03

## 2024-04-03 RX ORDER — SODIUM CHLORIDE 0.9 % (FLUSH) 0.9 %
5-40 SYRINGE (ML) INJECTION PRN
OUTPATIENT
Start: 2024-04-03

## 2024-04-03 RX ORDER — SODIUM CHLORIDE 9 MG/ML
INJECTION, SOLUTION INTRAVENOUS PRN
OUTPATIENT
Start: 2024-04-03

## 2024-04-03 RX ORDER — SODIUM CHLORIDE 0.9 % (FLUSH) 0.9 %
5-40 SYRINGE (ML) INJECTION EVERY 12 HOURS SCHEDULED
OUTPATIENT
Start: 2024-04-03

## 2024-04-03 RX ORDER — HYDROCODONE BITARTRATE AND ACETAMINOPHEN 5; 325 MG/1; MG/1
1 TABLET ORAL DAILY PRN
Qty: 30 TABLET | Refills: 0 | Status: SHIPPED | OUTPATIENT
Start: 2024-04-03 | End: 2024-05-03

## 2024-04-03 NOTE — PROGRESS NOTES
Gabi Orourke presents to the [facility] on 4/3/2024. Gabi is complaining of pain in her back radiates to groin and  LLE. The pain is constant. The pain is described as aching, throbbing, sharp, and burning. Pain is rated on her best day at a 5, on her worst day at a 8, and on average at a 7 on the VAS scale. She took her last dose of Norco and Aleve today.      Any procedures since your last visit: No    She is  on NSAIDS and  is not on anticoagulation medications to include none and is managed by NA.     Pacemaker or defibrillator: No     Medication Contract and Consent for Opioid Use Documents Filed        No documents found                       Resp 18   Ht 1.676 m (5' 6\")   Wt 76.2 kg (168 lb)   BMI 27.12 kg/m²      No LMP recorded. Patient has had a hysterectomy.

## 2024-04-03 NOTE — PROGRESS NOTES
[facility]  [address] [place] [address]  [phone]    Follow up Note      Gabi Orourke     Date of Visit:  4/3/2024    CC:  Patient presents for follow up   Chief Complaint   Patient presents with    Back Pain       HPI:  Worsening low back pain with radiation to the Left lower extremity.  Pain is described as sharp/shooting/constant.  Pain is aggravated b sitting/walking.  Appropriate analgesia with current medications regimen:yes    Change in quality of symptoms:no.    Medication side effects:none  Recent diagnostic testing:none  Recent interventional procedures: none    She has not been on anticoagulation medications to include none. The patient  has not been on herbal supplements.  The patient is diabetic.     Imaging:   Lumbar spine MRI 2020:  L1/L2   There is normal alignment and vertebral body height. The disc space   is normal. There is no evidence of canal or foraminal narrowing.   There is no evidence of bulging or herniated disc.   L2/L3   There is normal alignment and vertebral body height. The disc space   is normal. There is no evidence of canal or foraminal narrowing.   There is no evidence of bulging or herniated disc.   L3/L4   Asymmetrical bulging disc to the left with circumferentially oriented   annular tear. No canal or foraminal narrowing.   L4/L5   Trace spondylolisthesis and facet hypertrophy without canal stenosis.   Mild bilateral foraminal narrowing.   L5/S1   Asymmetrical bulging disc and/or osteophyte formation toward the   left. Bilateral facet hypertrophy. No measurable canal stenosis.   Bilateral foraminal narrowing worse on the left than the right.     Thoracic xray  Multilevel degenerative changes.  Scoliosis.    Cervical spine Xray   Vertebrae:  Dextroscoliosis which is unchanged.  Multilevel degenerative  joint disease within the facets.  Mild degenerative anterolisthesis C3 on C4  which has developed since the prior

## 2024-04-09 PROBLEM — Z78.0 MENOPAUSE: Status: ACTIVE | Noted: 2024-04-09

## 2024-04-09 PROBLEM — Z08 ENCOUNTER FOR FOLLOW-UP SURVEILLANCE OF BREAST CANCER: Status: ACTIVE | Noted: 2024-04-09

## 2024-04-09 PROBLEM — Z85.3 ENCOUNTER FOR FOLLOW-UP SURVEILLANCE OF BREAST CANCER: Status: ACTIVE | Noted: 2024-04-09

## 2024-04-09 NOTE — PROGRESS NOTES
Patient ID: [place]ham Alatorre is a 68 y.o. female.  BREAST CANCER DIAGNOSIS:   Breast   AJCC Edition: 8th (AJCC), Diagnosis Date: Sep 2020, Stage(no match), cT1b cN1 cM0 G2    Treatment Synopsis:    BREAST CANCER DIAGNOSIS  cT1N1 ER+(99%)/AK+(40%)/HER2 negative right sided breast cancer w/biopsy proven axillary involvement, low risk mammaprint, luminal A    - 20 mammographic abnormality on Right following many years of no mammographic screening.  She had not had a mammogram in years    - 20 Ultrasound guided biopsy at [facility] in [place], [place] found to have Invasive ductal carcinoma with biopsy @ 11:00, also with focal mucinous features. ER+(99%)/AK+(40%)/HER2 negative. Axillary biopsy ipsilateral positive for breast cancer.     -20  staging scans which were negative for any definitive evidence of distant metastases. Borderline  hilar and mediastinal nodes, largest measuring 1.3cm which require f/u.    -20 Medical oncology consult at  with Dr. Steve Negrete. Tissue to be tested for mammaprint.  Initiated for neoadjuvant endocrine therapy w/anastrozole. Mammaprint low risk luminal A    - 10/2020 Surgical oncology consult with Dr. Remi Glover    -2/15/21 Right breast lumpectomy after magseed localization, Right axillary sentinel node biopsy with Dr. Remi Glover    - 21--21 50 Gy in 25 Fx R tangs followed by boost (1000 cGy) radiation therapy x 30 treatments with Dr. Marques Cruz      Past Medical History: [place]ham has a past medical history of Breast cancer (CMS/[facility]) (2021), Other conditions influencing health status (2021), Personal history of irradiation, Personal history of other diseases of the musculoskeletal system and connective tissue (10/29/2020), and Personal history of other endocrine, nutritional and metabolic disease (10/29/2020).    Social History:  3 sons. They live locally to [place].    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 Quit Smoking approx 2010. 35 year hx 1ppd,  still smokes.   Family History:    Family History   Problem Relation Name Age of Onset    Other (cardiac disorder) Mother      Other (cardiac disorder) Father      Other (malignant neoplasm of esophagus) Father      Breast cancer Mother's Sister      Other (malignant neoplasm of stomach) Maternal Grandfather      Other (malignant neoplasm of oral cavity) Paternal Grandmother      Breast cancer Other maternal cousin     Other (malignant neoplasm of esophagus) Other maternal cousin      Family Oncology History:  Cancer-related family history includes Breast cancer in her mother's sister and another family member.      BREAST CANCER DIAGNOSIS  [id number] ER+/MD+/HER2- breast cancer luminal A, low risk Mammaprint      CURRENT THERAPY  has been on anastrozole since 9/25/2020-- initially preoperative, then adjuvant.  Bisphosphonate therapy with Zometa initiated 10/29/21    HISTORY OF PRESENT ILLNESS:  [place] Alatorre is a 68 y.o. female who comes today for breast cancer treatment follow-up, surveillance. Continued compliance with daily anastrazole. She denies any breast cancer concerns.     She reports new medications per PCP for DM- is now on ozempic. Reports fatigue is improved now. Reports sleep is stable    She denies any chest pain or breathing issues, sob. She continues with baseline dry cough since her previous covid infections.     She denies any vision changes or headache issues, dizziness or loss of balance or falls    She reports baseline issues with left shoulder pain- has established with Dr Beatty. Does water aerobics + chair yoga. She has baseline back pain from previous spinal fusion and hardware- is on Norco every day for this. She denies any new concerning bone aches or bone pains.     She continues Vit D weekly high dose supplementation for many years through her PCP.     She denies any  skin lesions or masses, oral sores lesions or infections. Continues to use right arm lymphedema sleeve and glove at night and has no issues. She is compliant with massage. She reports unchanged mild swelling under her left eye, non tender and comes and goes, was seen by eye dr.     She reports a normal appetite but smaller more frequent meals. She reports normal bowel movements. She reports normal urination.     Review of Systems - Oncology  ROS 14 points performed, See HPI for exceptions    OBJECTIVE:    VS / Pain:  /71   Pulse 94   Temp 35.6 °C (96 °F)   Resp 18   Wt 74.6 kg (164 lb 7.4 oz)   SpO2 96%   BMI 26.55 kg/m²   BSA: 1.86 meters squared   Pain Scale: 5    Performance Status:       Physical Exam  Constitutional: Well developed, awake/alert/oriented x4, no distress, alert and cooperative  EYES: Sclera clear  ENMT: mucous membranes moist, no apparent injury, no lesions seen  Head/Neck: Neck supple, no apparent injury, thyroid without mass or tenderness, No JVD, trachea midline, no bruits. No apparent swelling under left eye, no swelling of lacrimal sac  Respiratory / Thoracic: Patent airways, clear to all lobes, normal breath sounds with good chest expansion, thorax symmetric.  Cardiovascular: Regular, rate and rhythm, no murmurs, 2+ equal pulses of the extremities, normal auscultated S 1and S 2  GI: Nondistended, soft, non-tender, no rebound tenderness or guarding, no masses palpable, no organomegaly, +BS, no bruits  Musculoskeletal: ROM intact, no joint swelling, normal strength, no spinal tenderness  Extremities: normal extremities, no cyanosis edema, contusions or wounds, no clubbing  Neurological: alert and oriented x4, intact senses, motor, response and reflexes, normal strength  Breast: Left chest and axilla supple, free of masses or lesions. Right chest s/p lumpectomy with well healed incision- chest and axilla with mild skin thickening around nipple s/p RT; no evidence of chest or axilla  masses or lesions  Lymphatic: No cervical, supraclavicular, infraclavicular or axillary lymphadenopathy  Psychological: Appropriate and talkative mood and behavior  Skin: Warm and dry, no lesions, no rashes, no jaundice    Diagnostic Results   Narrative & Impression   Interpreted By:  Kaylin Jones,   STUDY:  BI MAMMO BILATERAL SCREENING TOMOSYNTHESIS;  10/10/2023 11:18 am      ACCESSION NUMBER(S):  [id number]      ORDERING CLINICIAN:  ELOY AARON      INDICATION:  Screening. Right lumpectomy with radiation treatment.      COMPARISON:  10/04/2022, 09/28/2021, 02/02/2021      FINDINGS:  2D and tomosynthesis images were reviewed at 1 mm slice thickness.      Density:  The breast tissue is heterogeneously dense, which may  obscure small masses.      Stable postsurgical scarring with surgical clips in the upper outer  right breast at posterior depth, the site of lumpectomy. Stable  postsurgical scarring overlying the right axilla. Stable mild  trabecular thickening and skin thickening right breast consistent  with radiation treatment. No suspicious masses or calcifications are  identified.      IMPRESSION:  No mammographic evidence of malignancy. Stable postoperative and  postradiation changes, right breast.      BI-RADS CATEGORY:      BI-RADS Category:  2 Benign.  Recommendation:  Routine Screening Mammogram in 1 Year.  Recommended Date:  1 Year.  Laterality:  Bilateral.      For any future breast imaging appointments, please call [phone]).          MACRO:  None      Signed by: Kaylin Jones 10/10/2023 11:56 AM         XR DEXA bone density  Narrative: Interpreted By:  Kirby Cm,   STUDY:  DEXA BONE DENSITY3/1/2024 3:03 pm      INDICATION:  Signs/Symptoms:Osteopenia, on aromatase inhibitor, menopausal. The  patient is a 67 y/o  year old F.      COMPARISON:  None.      ACCESSION NUMBER(S):  [id number]      ORDERING CLINICIAN:  ELOY AARON      TECHNIQUE:  DEXA BONE DENSITY      FINDINGS:  SPINE  L2-L4  Bone Mineral Density: 0.995  T-Score -0.8  Z-Score 1.2  Bone Mineral Density change vs baseline (%):  Not reported  Bone Mineral Density change vs previous (%): Not reported      LEFT FEMUR -TOTAL  Bone Mineral Density: 0.733  T-Score -1.7   Z-Score  -0.4  Bone Mineral Density change vs baseline (%): Not reported  Bone Mineral Density change vs previous (%): Not reported      LEFT FEMUR -NECK  Bone Mineral Density: 0.668  T-Score -1.6  Z-Score -0.1          World Health Organization (WHO) criteria for post-menopausal,   Women:  Normal:         T-score at or above -1 SD  Osteopenia:   T-score between -1 and -2.5 SD  Osteoporosis: T-score at or below -2.5 SD          10-year Fracture Risk (%):  Major Osteoporotic Fracture  9.5  Hip Fracture                        1.2      Note:  If no FRAX score is reported, it is because:  Some T-score for Spine Total or Hip Total or Femoral Neck at or below  -2.5      This exam was performed at [facility] on a [employer]  [facility] Dexa Unit.      Impression: DEXA:  According to World Health Organization criteria,  classification is low bone mass (osteopenia)      Followup recommended in two years or sooner as clinically warranted.      All images and detailed analysis are available on the  Radiology  PACS.      MACRO:  None      Signed by: Kirby Cm 3/1/2024 7:25 PM  Dictation workstation:   [id number]       Comprehensive Metabolic Panel, Fasting  Order: [id number]  Component  Ref Range & Units 6 d ago   Sodium  132 - 146 mmol/L 138   Potassium  3.5 - 5.0 mmol/L 4.9   Chloride  98 - 107 mmol/L 99   CO2  22 - 29 mmol/L 19 Low    Anion Gap  7 - 16 mmol/L 20 High    Glucose, Fasting  74 - 99 mg/dL 138 High    BUN  6 - 23 mg/dL 10   Creatinine  0.50 - 1.00 mg/dL 0.6   Est, Glom Filt Rate  >60 mL/min/1.73m2 >60      Calcium  8.6 - 10.2 mg/dL 9.3   Total Protein  6.4 - 8.3 g/dL 6.9   Albumin  3.5 - 5.2 g/dL 3.9   Total Bilirubin  0.0 - 1.2 mg/dL 0.3    Alkaline Phosphatase  35 - 104 U/L 91   ALT  0 - 32 U/L 18   AST  0 - 31 U/L 18         Component  Ref Range & Units 6 d ago   WBC  4.5 - 11.5 k/uL 7.2   RBC  3.50 - 5.50 m/uL 4.28   Hemoglobin  11.5 - 15.5 g/dL 13.5   Hematocrit  34.0 - 48.0 % 43.3   MCV  80.0 - 99.9 fL 101.2 High    MCH  26.0 - 35.0 pg 31.5   MCHC  32.0 - 34.5 g/dL 31.2 Low    RDW  11.5 - 15.0 % 12.9   Platelets  130 - 450 k/uL 244   MPV  7.0 - 12.0 fL 10.3   Resulting Agency [facility]     Specimen Collected: 10/04/23 11:05    Performed by: [facility] Last Resulted: 10/04/23 21:58   [facility]  Outside Information      suggestion  Information displayed in this report may not trend or trigger automated decision support.     Vitamin D 25 Hydroxy  Order: [id number]  Component  Ref Range & Units 6 d ago   Vit D, 25-Hydroxy  30.0 - 100.0 ng/mL 42.9   Resulting Agency [facility]     Specimen Collected: 10/04/23 11:05    Performed by: [facility] Last Resulted: 10/04/23 22:53   Received From: [facility] [employer] [facility]  Result Received: 10/10/23 10:42       Assessment/Plan   Malignant neoplasm of upper-outer quadrant of right female breast (CMS/HCC), Clinical: Stage IB (cT1b, cN1, cM0, G2, ER+, MD+, HER2-)  [place] was seen today for follow-up.  Diagnoses and all orders for this visit:  Malignant neoplasm of upper-outer quadrant of right breast in female, estrogen receptor positive (CMS/HCC) (Primary)  -     Clinic Appointment Request Follow up; ELOY AARON  -     BI mammo bilateral screening tomosynthesis; Future  -     Clinic Appointment Request Follow up; ELOY AARON; Future  -     Cancel: Vitamin D 25-Hydroxy,Total (for eval of Vitamin D levels); Future  -     Comprehensive Metabolic Panel; Future  Encounter for monitoring anastrozole therapy  -     Clinic Appointment Request  Follow up; ELOY AARON  Personal history of irradiation  Routine health maintenance  Encounter for follow-up surveillance of breast cancer  Osteopenia, unspecified location  -     Clinic Appointment Request Follow up; ELOY AARON  -     Cancel: Vitamin D 25-Hydroxy,Total (for eval of Vitamin D levels); Future  -     Comprehensive Metabolic Panel; Future  Menopause  -     Clinic Appointment Request Follow up; ELOY AARON  Lymphedema  -     Clinic Appointment Request Follow up; ELOY AARON  Vitamin D deficiency  -     Cancel: Vitamin D 25-Hydroxy,Total (for eval of Vitamin D levels); Future  -     Comprehensive Metabolic Panel; Future    Problem List Items Addressed This Visit       Lymphedema    Malignant neoplasm of upper-outer quadrant of right female breast (CMS/HCC) - Primary    Relevant Orders    BI mammo bilateral screening tomosynthesis    Clinic Appointment Request Follow up; ELOY AARON    Comprehensive Metabolic Panel    Encounter for monitoring anastrozole therapy    Osteopenia    Relevant Orders    Comprehensive Metabolic Panel    Routine health maintenance    Personal history of irradiation    Encounter for follow-up surveillance of breast cancer    Menopause    Vitamin D deficiency    Relevant Orders    Comprehensive Metabolic Panel      uM1hV1P4 ER+/[place]+/HER2- breast cancer luminal A, low risk Mammaprint.   [place] is doing well today, continues to tolerate anastrazole since 9/2020. Goal is 5 years -Sept 2025    There is no evidence on clinical exam today for breast cancer recurrence       Osteopenia.   Zometa initiated in October 2021 without any major issues. Orders placed today for  final dose #6 May 2024.    Repeat DEXA 3/2024 Osteopenia T-score -1.7 orders placed today      Lymphedema.   Stable right arm and hand lymph swelling. She is performing self massage Right arm and breast, compliant with compression garments at night       General health maintenance    Continues to follow with Dr. Mota    At least 20 minutes of direct consultation was spent with the patient today reviewing her cancer care plan, educating and answering questions regarding ongoing follow up, greater than 50% in counseling and coordination of care      Treatment Plan:  Venous Access Orders  Zoledronic Acid (Zometa), Every 24 Weeks          Thank you for the opportunity to be involved in the care of [place] Alator[place].   We discussed the clinical significance of diagnosis, goals of care and treatment plan in detail.  Please do not hesitate to reach out with any questions. Thank you.     -------------------------------------------------------------------------------------------------------------------------------  Latha Tapia MSN, APRN, FNP-C  [facility]  Division of Medical Oncology- Breast   Collaborating Physician Dr. Steve Negrete   [facility] Jessica Lorenzo, Keke Cesar [place] [address]  Phone: [phone]  Fax: [phone]  Available via Secure Chat    Confidential Peer Review Document-  Privilege  Privileged Pursuant to [place] Revised Code Section 2305.24, .25, .251 & .252

## 2024-04-10 ENCOUNTER — LAB (OUTPATIENT)
Dept: LAB | Facility: LAB | Age: 69
End: 2024-04-10
Payer: MEDICARE

## 2024-04-10 ENCOUNTER — OFFICE VISIT (OUTPATIENT)
Dept: HEMATOLOGY/ONCOLOGY | Facility: CLINIC | Age: 69
End: 2024-04-10
Payer: MEDICARE

## 2024-04-10 VITALS
OXYGEN SATURATION: 96 % | TEMPERATURE: 96 F | RESPIRATION RATE: 18 BRPM | SYSTOLIC BLOOD PRESSURE: 122 MMHG | WEIGHT: 164.46 LBS | BODY MASS INDEX: 26.55 KG/M2 | DIASTOLIC BLOOD PRESSURE: 71 MMHG | HEART RATE: 94 BPM

## 2024-04-10 DIAGNOSIS — Z00.00 ROUTINE HEALTH MAINTENANCE: ICD-10-CM

## 2024-04-10 DIAGNOSIS — Z92.3 PERSONAL HISTORY OF IRRADIATION: ICD-10-CM

## 2024-04-10 DIAGNOSIS — I89.0 LYMPHEDEMA: ICD-10-CM

## 2024-04-10 DIAGNOSIS — E55.9 VITAMIN D DEFICIENCY: ICD-10-CM

## 2024-04-10 DIAGNOSIS — C50.411 MALIGNANT NEOPLASM OF UPPER-OUTER QUADRANT OF RIGHT BREAST IN FEMALE, ESTROGEN RECEPTOR POSITIVE (MULTI): Primary | ICD-10-CM

## 2024-04-10 DIAGNOSIS — M85.80 OSTEOPENIA, UNSPECIFIED LOCATION: ICD-10-CM

## 2024-04-10 DIAGNOSIS — Z08 ENCOUNTER FOR FOLLOW-UP SURVEILLANCE OF BREAST CANCER: ICD-10-CM

## 2024-04-10 DIAGNOSIS — Z17.0 MALIGNANT NEOPLASM OF UPPER-OUTER QUADRANT OF RIGHT BREAST IN FEMALE, ESTROGEN RECEPTOR POSITIVE (MULTI): Primary | ICD-10-CM

## 2024-04-10 DIAGNOSIS — Z79.811 ENCOUNTER FOR MONITORING ANASTROZOLE THERAPY: ICD-10-CM

## 2024-04-10 DIAGNOSIS — Z85.3 ENCOUNTER FOR FOLLOW-UP SURVEILLANCE OF BREAST CANCER: ICD-10-CM

## 2024-04-10 DIAGNOSIS — C50.411 MALIGNANT NEOPLASM OF UPPER-OUTER QUADRANT OF RIGHT BREAST IN FEMALE, ESTROGEN RECEPTOR POSITIVE (MULTI): ICD-10-CM

## 2024-04-10 DIAGNOSIS — Z78.0 MENOPAUSE: ICD-10-CM

## 2024-04-10 DIAGNOSIS — Z51.81 ENCOUNTER FOR MONITORING ANASTROZOLE THERAPY: ICD-10-CM

## 2024-04-10 DIAGNOSIS — Z17.0 MALIGNANT NEOPLASM OF UPPER-OUTER QUADRANT OF RIGHT BREAST IN FEMALE, ESTROGEN RECEPTOR POSITIVE (MULTI): ICD-10-CM

## 2024-04-10 LAB
ALBUMIN SERPL BCP-MCNC: 4.5 G/DL (ref 3.4–5)
ALP SERPL-CCNC: 84 U/L (ref 33–136)
ALT SERPL W P-5'-P-CCNC: 18 U/L (ref 7–45)
ANION GAP SERPL CALC-SCNC: 11 MMOL/L (ref 10–20)
AST SERPL W P-5'-P-CCNC: 14 U/L (ref 9–39)
BILIRUB SERPL-MCNC: 0.4 MG/DL (ref 0–1.2)
BUN SERPL-MCNC: 11 MG/DL (ref 6–23)
CALCIUM SERPL-MCNC: 9.8 MG/DL (ref 8.6–10.3)
CHLORIDE SERPL-SCNC: 99 MMOL/L (ref 98–107)
CO2 SERPL-SCNC: 31 MMOL/L (ref 21–32)
CREAT SERPL-MCNC: 0.66 MG/DL (ref 0.5–1.05)
EGFRCR SERPLBLD CKD-EPI 2021: >90 ML/MIN/1.73M*2
GLUCOSE SERPL-MCNC: 135 MG/DL (ref 74–99)
POTASSIUM SERPL-SCNC: 4.3 MMOL/L (ref 3.5–5.3)
PROT SERPL-MCNC: 7.1 G/DL (ref 6.4–8.2)
SODIUM SERPL-SCNC: 137 MMOL/L (ref 136–145)

## 2024-04-10 PROCEDURE — 80053 COMPREHEN METABOLIC PANEL: CPT

## 2024-04-10 PROCEDURE — 1036F TOBACCO NON-USER: CPT | Performed by: NURSE PRACTITIONER

## 2024-04-10 PROCEDURE — 1126F AMNT PAIN NOTED NONE PRSNT: CPT | Performed by: NURSE PRACTITIONER

## 2024-04-10 PROCEDURE — 1159F MED LIST DOCD IN RCRD: CPT | Performed by: NURSE PRACTITIONER

## 2024-04-10 PROCEDURE — 36415 COLL VENOUS BLD VENIPUNCTURE: CPT

## 2024-04-10 PROCEDURE — 99214 OFFICE O/P EST MOD 30 MIN: CPT | Performed by: NURSE PRACTITIONER

## 2024-04-10 RX ORDER — HYDROCODONE BITARTRATE AND ACETAMINOPHEN 5; 325 MG/1; MG/1
1 TABLET ORAL DAILY
COMMUNITY

## 2024-04-10 ASSESSMENT — PAIN SCALES - GENERAL: PAINLEVEL: 0-NO PAIN

## 2024-04-10 NOTE — PATIENT INSTRUCTIONS
Latha LUCERO, CNP follow-up 6 months October 2024.      Zometa #6 and Final dose 4/22/24 Labs today please, will check Vit D      Bone density repeat 3/2024 with continued moderate osteopenia. Will repeat Summer 2025    Anastrozole will completed at 5 years Sept 2025. Prescription  is current through 10/2024     PCP Dr Mota annually and as needed.      Call with any questions, concerns or treatment intolerance prior to next office visit [phone].

## 2024-04-22 ENCOUNTER — INFUSION (OUTPATIENT)
Dept: HEMATOLOGY/ONCOLOGY | Facility: CLINIC | Age: 69
End: 2024-04-22
Payer: MEDICARE

## 2024-04-22 VITALS
TEMPERATURE: 96.3 F | WEIGHT: 165.12 LBS | SYSTOLIC BLOOD PRESSURE: 146 MMHG | OXYGEN SATURATION: 95 % | DIASTOLIC BLOOD PRESSURE: 73 MMHG | BODY MASS INDEX: 26.65 KG/M2 | RESPIRATION RATE: 18 BRPM | HEART RATE: 88 BPM

## 2024-04-22 DIAGNOSIS — Z17.0 MALIGNANT NEOPLASM OF UPPER-OUTER QUADRANT OF RIGHT BREAST IN FEMALE, ESTROGEN RECEPTOR POSITIVE (MULTI): ICD-10-CM

## 2024-04-22 DIAGNOSIS — C50.411 MALIGNANT NEOPLASM OF UPPER-OUTER QUADRANT OF RIGHT BREAST IN FEMALE, ESTROGEN RECEPTOR POSITIVE (MULTI): ICD-10-CM

## 2024-04-22 LAB
ALBUMIN SERPL BCP-MCNC: 3.8 G/DL (ref 3.4–5)
ALP SERPL-CCNC: 77 U/L (ref 33–136)
ALT SERPL W P-5'-P-CCNC: 22 U/L (ref 7–45)
ANION GAP SERPL CALC-SCNC: 9 MMOL/L (ref 10–20)
AST SERPL W P-5'-P-CCNC: 15 U/L (ref 9–39)
BILIRUB SERPL-MCNC: 0.3 MG/DL (ref 0–1.2)
BUN SERPL-MCNC: 11 MG/DL (ref 6–23)
CALCIUM SERPL-MCNC: 9 MG/DL (ref 8.6–10.6)
CHLORIDE SERPL-SCNC: 103 MMOL/L (ref 98–107)
CO2 SERPL-SCNC: 30 MMOL/L (ref 21–32)
CREAT SERPL-MCNC: 0.5 MG/DL (ref 0.5–1.05)
EGFRCR SERPLBLD CKD-EPI 2021: >90 ML/MIN/1.73M*2
GLUCOSE SERPL-MCNC: 144 MG/DL (ref 74–99)
POTASSIUM SERPL-SCNC: 4.2 MMOL/L (ref 3.5–5.3)
PROT SERPL-MCNC: 6.1 G/DL (ref 6.4–8.2)
SODIUM SERPL-SCNC: 138 MMOL/L (ref 136–145)

## 2024-04-22 PROCEDURE — 96365 THER/PROPH/DIAG IV INF INIT: CPT | Mod: INF

## 2024-04-22 PROCEDURE — 80053 COMPREHEN METABOLIC PANEL: CPT

## 2024-04-22 PROCEDURE — 2500000004 HC RX 250 GENERAL PHARMACY W/ HCPCS (ALT 636 FOR OP/ED): Performed by: NURSE PRACTITIONER

## 2024-04-22 RX ORDER — EPINEPHRINE 0.3 MG/.3ML
0.3 INJECTION SUBCUTANEOUS EVERY 5 MIN PRN
Status: DISCONTINUED | OUTPATIENT
Start: 2024-04-22 | End: 2024-04-22 | Stop reason: HOSPADM

## 2024-04-22 RX ORDER — ALBUTEROL SULFATE 0.83 MG/ML
3 SOLUTION RESPIRATORY (INHALATION) AS NEEDED
Status: DISCONTINUED | OUTPATIENT
Start: 2024-04-22 | End: 2024-04-22 | Stop reason: HOSPADM

## 2024-04-22 RX ORDER — HEPARIN SODIUM,PORCINE/PF 10 UNIT/ML
50 SYRINGE (ML) INTRAVENOUS AS NEEDED
OUTPATIENT
Start: 2024-04-22

## 2024-04-22 RX ORDER — DIPHENHYDRAMINE HYDROCHLORIDE 50 MG/ML
50 INJECTION INTRAMUSCULAR; INTRAVENOUS AS NEEDED
Status: DISCONTINUED | OUTPATIENT
Start: 2024-04-22 | End: 2024-04-22 | Stop reason: HOSPADM

## 2024-04-22 RX ORDER — EPINEPHRINE 0.3 MG/.3ML
0.3 INJECTION SUBCUTANEOUS EVERY 5 MIN PRN
OUTPATIENT
Start: 2024-10-07

## 2024-04-22 RX ORDER — FAMOTIDINE 10 MG/ML
20 INJECTION INTRAVENOUS ONCE AS NEEDED
OUTPATIENT
Start: 2024-10-07

## 2024-04-22 RX ORDER — ALBUTEROL SULFATE 0.83 MG/ML
3 SOLUTION RESPIRATORY (INHALATION) AS NEEDED
OUTPATIENT
Start: 2024-10-07

## 2024-04-22 RX ORDER — FAMOTIDINE 10 MG/ML
20 INJECTION INTRAVENOUS ONCE AS NEEDED
Status: DISCONTINUED | OUTPATIENT
Start: 2024-04-22 | End: 2024-04-22 | Stop reason: HOSPADM

## 2024-04-22 RX ORDER — HEPARIN 100 UNIT/ML
500 SYRINGE INTRAVENOUS AS NEEDED
OUTPATIENT
Start: 2024-04-22

## 2024-04-22 RX ORDER — DIPHENHYDRAMINE HYDROCHLORIDE 50 MG/ML
50 INJECTION INTRAMUSCULAR; INTRAVENOUS AS NEEDED
OUTPATIENT
Start: 2024-10-07

## 2024-04-22 RX ADMIN — SODIUM CHLORIDE 500 ML: 9 INJECTION, SOLUTION INTRAVENOUS at 13:34

## 2024-04-22 RX ADMIN — ZOLEDRONIC ACID 4 MG: 4 INJECTION, SOLUTION, CONCENTRATE INTRAVENOUS at 14:04

## 2024-04-22 SDOH — ECONOMIC STABILITY: FOOD INSECURITY: WITHIN THE PAST 12 MONTHS, YOU WORRIED THAT YOUR FOOD WOULD RUN OUT BEFORE YOU GOT MONEY TO BUY MORE.: NEVER TRUE

## 2024-04-22 SDOH — ECONOMIC STABILITY: INCOME INSECURITY: HOW HARD IS IT FOR YOU TO PAY FOR THE VERY BASICS LIKE FOOD, HOUSING, MEDICAL CARE, AND HEATING?: NOT HARD AT ALL

## 2024-04-22 SDOH — ECONOMIC STABILITY: FOOD INSECURITY: WITHIN THE PAST 12 MONTHS, THE FOOD YOU BOUGHT JUST DIDN'T LAST AND YOU DIDN'T HAVE MONEY TO GET MORE.: NEVER TRUE

## 2024-04-22 SDOH — ECONOMIC STABILITY: TRANSPORTATION INSECURITY
IN THE PAST 12 MONTHS, HAS LACK OF TRANSPORTATION KEPT YOU FROM MEETINGS, WORK, OR FROM GETTING THINGS NEEDED FOR DAILY LIVING?: NO

## 2024-04-22 ASSESSMENT — PAIN SCALES - GENERAL: PAINLEVEL: 0-NO PAIN

## 2024-04-23 ENCOUNTER — OFFICE VISIT (OUTPATIENT)
Dept: PRIMARY CARE CLINIC | Age: 69
End: 2024-04-23
Payer: MEDICARE

## 2024-04-23 VITALS
TEMPERATURE: 97.8 F | DIASTOLIC BLOOD PRESSURE: 82 MMHG | BODY MASS INDEX: 26.36 KG/M2 | OXYGEN SATURATION: 100 % | SYSTOLIC BLOOD PRESSURE: 120 MMHG | WEIGHT: 164 LBS | HEART RATE: 97 BPM | HEIGHT: 66 IN

## 2024-04-23 DIAGNOSIS — E11.9 TYPE 2 DIABETES MELLITUS WITHOUT COMPLICATION, WITHOUT LONG-TERM CURRENT USE OF INSULIN (HCC): Primary | ICD-10-CM

## 2024-04-23 DIAGNOSIS — E03.9 ACQUIRED HYPOTHYROIDISM: ICD-10-CM

## 2024-04-23 DIAGNOSIS — E55.9 VITAMIN D DEFICIENCY: ICD-10-CM

## 2024-04-23 DIAGNOSIS — M76.892 CAPSULITIS OF LEFT HIP: ICD-10-CM

## 2024-04-23 LAB — HBA1C MFR BLD: 5.9 %

## 2024-04-23 PROCEDURE — 83036 HEMOGLOBIN GLYCOSYLATED A1C: CPT | Performed by: FAMILY MEDICINE

## 2024-04-23 PROCEDURE — 3044F HG A1C LEVEL LT 7.0%: CPT | Performed by: FAMILY MEDICINE

## 2024-04-23 PROCEDURE — 1123F ACP DISCUSS/DSCN MKR DOCD: CPT | Performed by: FAMILY MEDICINE

## 2024-04-23 PROCEDURE — 99214 OFFICE O/P EST MOD 30 MIN: CPT | Performed by: FAMILY MEDICINE

## 2024-04-23 RX ORDER — SEMAGLUTIDE 0.68 MG/ML
INJECTION, SOLUTION SUBCUTANEOUS
COMMUNITY
Start: 2024-04-05

## 2024-04-24 ENCOUNTER — TELEPHONE (OUTPATIENT)
Dept: PAIN MANAGEMENT | Age: 69
End: 2024-04-24

## 2024-04-24 NOTE — TELEPHONE ENCOUNTER
Call to Gabi Orourke that procedure was approved for 4/29/2024 and that [facility] should call her a few days before for the pre op call and between 2:00 PM and 4:00 PM  the business day before with the arrival time. Instructed Gabi to hold ibuprofen for 24 hours, naprosyn or mobic for 4 days and any aspirin containing products or fish oil for 7 days. Instructed to call office back if any questions. Gabi verbalized understanding.    Electronically signed by Margarito Adorno RN on 4/24/2024 at 9:39 AM

## 2024-04-25 ENCOUNTER — PREP FOR PROCEDURE (OUTPATIENT)
Dept: PAIN MANAGEMENT | Age: 69
End: 2024-04-25

## 2024-04-26 LAB
ALBUMIN SERPL-MCNC: 4 G/DL
ALP BLD-CCNC: 98 U/L
ALT SERPL-CCNC: 20 U/L
AST SERPL-CCNC: 15 U/L
BILIRUB SERPL-MCNC: 0.2 MG/DL (ref 0.1–1.4)
BUN BLDV-MCNC: 9 MG/DL
CALCIUM SERPL-MCNC: 9.3 MG/DL
CHLORIDE BLD-SCNC: 102 MMOL/L
CHOLESTEROL, TOTAL: 112 MG/DL
CHOLESTEROL/HDL RATIO: 2.8
CO2: 29 MMOL/L
CREAT SERPL-MCNC: 0.54 MG/DL
GLUCOSE FASTING: 124 MG/DL
HDLC SERPL-MCNC: 40 MG/DL (ref 35–70)
LDL CHOLESTEROL CALCULATED: 59 MG/DL (ref 0–160)
NONHDLC SERPL-MCNC: 72 MG/DL
POTASSIUM SERPL-SCNC: 4.3 MMOL/L
SODIUM BLD-SCNC: 141 MMOL/L
TOTAL PROTEIN: 7.3 G/DL (ref 6.4–8.2)
TRIGL SERPL-MCNC: 65 MG/DL
TSH SERPL DL<=0.05 MIU/L-ACNC: 2.8 UIU/ML
VITAMIN D 25-HYDROXY: 51.5
VITAMIN D2, 25 HYDROXY: NORMAL
VITAMIN D3,25 HYDROXY: NORMAL
VLDLC SERPL CALC-MCNC: 13 MG/DL

## 2024-04-29 ENCOUNTER — HOSPITAL ENCOUNTER (OUTPATIENT)
Dept: OPERATING ROOM | Age: 69
Setting detail: OUTPATIENT SURGERY
Discharge: HOME | End: 2024-04-29
Attending: PAIN MEDICINE
Payer: MEDICARE

## 2024-04-29 ENCOUNTER — HOSPITAL ENCOUNTER (OUTPATIENT)
Age: 69
Setting detail: OUTPATIENT SURGERY
Discharge: HOME | End: 2024-04-29
Attending: PAIN MEDICINE | Admitting: PAIN MEDICINE
Payer: MEDICARE

## 2024-04-29 VITALS
SYSTOLIC BLOOD PRESSURE: 126 MMHG | HEIGHT: 66 IN | HEART RATE: 88 BPM | RESPIRATION RATE: 16 BRPM | WEIGHT: 164 LBS | BODY MASS INDEX: 26.36 KG/M2 | TEMPERATURE: 98.3 F | DIASTOLIC BLOOD PRESSURE: 61 MMHG | OXYGEN SATURATION: 95 %

## 2024-04-29 DIAGNOSIS — M54.16 LUMBAR RADICULOPATHY: ICD-10-CM

## 2024-04-29 PROCEDURE — 6360000004 HC RX CONTRAST MEDICATION: Performed by: PAIN MEDICINE

## 2024-04-29 PROCEDURE — 6360000002 HC RX W HCPCS: Performed by: PAIN MEDICINE

## 2024-04-29 PROCEDURE — 2709999900 HC NON-CHARGEABLE SUPPLY: Performed by: PAIN MEDICINE

## 2024-04-29 PROCEDURE — 7100000011 HC PHASE II RECOVERY - ADDTL 15 MIN: Performed by: PAIN MEDICINE

## 2024-04-29 PROCEDURE — 62323 NJX INTERLAMINAR LMBR/SAC: CPT | Performed by: PAIN MEDICINE

## 2024-04-29 PROCEDURE — 2500000003 HC RX 250 WO HCPCS: Performed by: PAIN MEDICINE

## 2024-04-29 PROCEDURE — 7100000010 HC PHASE II RECOVERY - FIRST 15 MIN: Performed by: PAIN MEDICINE

## 2024-04-29 PROCEDURE — 3600000005 HC SURGERY LEVEL 5 BASE: Performed by: PAIN MEDICINE

## 2024-04-29 RX ORDER — SODIUM CHLORIDE 0.9 % (FLUSH) 0.9 %
5-40 SYRINGE (ML) INJECTION PRN
Status: DISCONTINUED | OUTPATIENT
Start: 2024-04-29 | End: 2024-04-29 | Stop reason: HOSPADM

## 2024-04-29 RX ORDER — LIDOCAINE HYDROCHLORIDE 5 MG/ML
INJECTION, SOLUTION INFILTRATION; INTRAVENOUS PRN
Status: DISCONTINUED | OUTPATIENT
Start: 2024-04-29 | End: 2024-04-29 | Stop reason: ALTCHOICE

## 2024-04-29 RX ORDER — SODIUM CHLORIDE 0.9 % (FLUSH) 0.9 %
5-40 SYRINGE (ML) INJECTION EVERY 12 HOURS SCHEDULED
Status: DISCONTINUED | OUTPATIENT
Start: 2024-04-29 | End: 2024-04-29 | Stop reason: HOSPADM

## 2024-04-29 RX ORDER — SODIUM CHLORIDE 9 MG/ML
INJECTION, SOLUTION INTRAVENOUS PRN
Status: DISCONTINUED | OUTPATIENT
Start: 2024-04-29 | End: 2024-04-29 | Stop reason: HOSPADM

## 2024-04-29 ASSESSMENT — PAIN - FUNCTIONAL ASSESSMENT
PAIN_FUNCTIONAL_ASSESSMENT: 0-10

## 2024-04-29 ASSESSMENT — PAIN DESCRIPTION - DESCRIPTORS: DESCRIPTORS: ACHING

## 2024-04-29 NOTE — H&P
maternal great aunt - breast         REVIEW OF SYSTEMS:    CONSTITUTIONAL:  negative for  fevers, chills, sweats and fatigue    RESPIRATORY:  negative for  dry cough, cough with sputum, dyspnea, wheezing and chest pain    CARDIOVASCULAR:  negative for chest pain, dyspnea, palpitations, syncope    GASTROINTESTINAL:  negative for nausea, vomiting, change in bowel habits, diarrhea, constipation and abdominal pain    MUSCULOSKELETAL: negative for muscle weakness    SKIN: negative for itching or rashes.    BEHAVIOR/PSYCH:  negative for poor appetite, increased appetite, decreased sleep and poor concentration    All other systems negative      PHYSICAL EXAM:    VITALS:  /65   Pulse 97   Temp 98.3 °F (36.8 °C) (Temporal)   Resp 16   Ht 1.676 m (5' 6\")   Wt 74.4 kg (164 lb)   SpO2 100%   BMI 26.47 kg/m²     CONSTITUTIONAL:  awake, alert, cooperative, no apparent distress, and appears stated age    EYES: PERRLA, EOMI    LUNGS:  No increased work of breathing, no audible wheezing    CARDIOVASCULAR:  regular rate and rhythm    ABDOMEN:  Soft non tender non distended     EXTREMITIES: no signs of clubbing or cyanosis.    MUSCULOSKELETAL: negative for flaccid muscle tone or spastic movements.    SKIN: gross examination reveals no signs of rashes, or diaphoresis.    NEURO: Cranial nerves II-XII grossly intact. No signs of agitated mood.       Assessment/Plan:    Low back and left lower extremity pain for LESI L5-S1.

## 2024-04-29 NOTE — OP NOTE
2024    Patient: Gabi Orourke  :  1955  Age:  68 y.o.  Sex:  female     PRE-OPERATIVE DIAGNOSIS: Lumbar disc displacement, lumbar radiculopathy.    POST-OPERATIVE DIAGNOSIS: Same.    PROCEDURE: Fluoroscopic guided therapeutic lumbar epidural steroid injection at the L5-S1 level (# 1).    SURGEON: WILLIAN Don.    ANESTHESIA: Local    ESTIMATED BLOOD LOSS: None.  ______________________________________________________________________    BRIEF HISTORY:  Gabi Orourke comes in today for first lumbar epidural injection at L5-S1 level. The potential complications of this procedure were discussed with her again today.  She has elected to undergo the aforementioned procedure.    Gabi’s complete History & Physical examination were reviewed in depth, a copy of which is in the chart.      DESCRIPTION OF PROCEDURE:    After confirming written and informed consent, a time-out was performed and Gabi’s name and date of birth, the procedure to be performed as well as the plan for the location of the needle insertion were confirmed.    The patient was brought into the procedure room and placed in the prone position on the fluoroscopy table. A pillow was placed under the patient's lower abdomen/upper pelvis to increase lumbar interlaminar space. Standard monitors were placed, and vital signs were observed throughout the procedure. The area of the lumbar spine was prepped with chloraprep and draped in a sterile manner. The L5-S1 interspace was identified and marked under AP fluoroscopy. The skin and subcutaneous tissues at the above level were anesthestized with 0.5% lidocaine. With intermittent fluoroscopy, an # 18 gauge 3 1/2 tuohy epidural needle was inserted and directed toward the interlaminar space. The needle was slowly advanced using loss of resistance technique and 5 cc glass syringe  until the tip of the epidural needle has passed through the ligamentum flavum and entered the epidural

## 2024-04-29 NOTE — DISCHARGE INSTRUCTIONS
[facility] Pain Management Department  [phone]   Post-Pain Block/ Radiofrequency Home Going Instructions    1-Go home, rest for the remainder of the day    2-Please do not lift over 20 pounds the day of the injection    3-If you received sedation No: alcohol, driving, operating lawn mowers, plows, tractors or other dangerous equipment until next morning. Do not make important decisions or sign legal documents for 24 hours. You may experience light headedness, dizziness, nausea or sleepiness after sedation. Do not stay alone. A responsible adult must be with you for 24 hours. You could be nauseated from the medications you have received. Your IV site may be sore and bruised.    4-No dietary restrictions     5-Resume all medications the same day, blood thinners to be resumed 24 hours after injection    6-Keep the surgical site clean and dry, you may shower the next morning and remove the      dressing.     7- No sitz baths, tub baths or hot tubs/swimming for 24 hours.       8- If you have any pain at the injection site(s), application of an ice pack to the area should be       helpful, 20 minutes on/20 minutes off for next 48 hours.    9- Call [facility] pain management immediately at if you develop.  Fever greater than 100.4 F  Have bleeding or drainage from the puncture site  Have progressive Leg/arm numbness and or weakness  Loss of control of bowel and or bladder (wet/soil yourself)  Severe headache with inability to lift head    10-You may return to work the next day         Infection After Surgery: Care Instructions  Overview  After surgery, an infection is always possible. It doesn't mean that the surgery didn't go well.  Because an infection can be serious, your doctor has taken steps to manage it.  Your doctor checked the infection and cleaned it if necessary. Your doctor may have made an opening in the area so that the pus can drain out. You may have gauze in the cut so that the area will stay open and

## 2024-05-01 RX ORDER — MELOXICAM 15 MG/1
15 TABLET ORAL DAILY PRN
Qty: 30 TABLET | Refills: 0 | OUTPATIENT
Start: 2024-05-01

## 2024-05-01 ASSESSMENT — ENCOUNTER SYMPTOMS
PHOTOPHOBIA: 0
NAUSEA: 0
COUGH: 0
ABDOMINAL PAIN: 0
SINUS PRESSURE: 0
BACK PAIN: 1
ABDOMINAL DISTENTION: 0
EYE PAIN: 0
EYE DISCHARGE: 0
CONSTIPATION: 0
RHINORRHEA: 0
DIARRHEA: 0
VOMITING: 0
COLOR CHANGE: 0
SORE THROAT: 0
FACIAL SWELLING: 0
BLOOD IN STOOL: 0
WHEEZING: 0
SHORTNESS OF BREATH: 0
EYE ITCHING: 0

## 2024-05-01 NOTE — PROGRESS NOTES
04/23/24 1253   BP: 120/82   Position: Sitting   Pulse: 97   Temp: 97.8 °F (36.6 °C)   TempSrc: Temporal   SpO2: 100%   Weight: 74.4 kg (164 lb)   Height: 1.676 m (5' 6\")      Body mass index is 26.47 kg/m².        General Appearance: alert and oriented to person, place and time, well-developed and well-nourished, in no acute distress  Skin: warm and dry, no rash or erythema  Head: normocephalic and atraumatic  Eyes: pupils equal, round, and reactive to light, extraocular eye movements intact, conjunctivae normal  ENT: tympanic membrane, external ear and ear canal normal bilaterally, oropharynx clear and moist with normal mucous membranes  Neck: neck supple and non tender without mass, no thyromegaly or thyroid nodules, no cervical lymphadenopathy   Pulmonary/Chest: clear to auscultation bilaterally- no wheezes, rales or rhonchi, normal air movement, no respiratory distress  Cardiovascular: normal rate, regular rhythm, normal S1 and S2, no murmurs, rubs, clicks or gallops, distal pulses intact, no carotid bruits  Abdomen: soft, non-tender, non-distended, normal bowel sounds, no masses or organomegaly  Extremities: no cyanosis, clubbing or edema  Neurologic: gait and coordination normal and speech normal   Musculoskeletal: There is evidence of moderate scoliosis and scar consistent with surgical history.      No Known Allergies  Prior to Visit Medications    Medication Sig Taking? Authorizing Provider   OZEMPIC, 0.25 OR 0.5 MG/DOSE, 2 MG/3ML SOPN INJECT 0.5 MG INTO THE SKIN ONE TIME PER WEEK Yes ProviderIsaac MD   HYDROcodone-acetaminophen (NORCO) 5-325 MG per tablet Take 1 tablet by mouth daily as needed for Pain for up to 30 days. Max Daily Amount: 1 tablet Yes Neri Orantes MD   meloxicam (MOBIC) 15 MG tablet Take 1 tablet by mouth daily as needed for Pain Yes Neri Orantes MD   metFORMIN (GLUCOPHAGE) 500 MG tablet TAKE 2 TABLETS BY MOUTH TWICE A DAY Yes Davi Horton,    levothyroxine (SYNTHROID) 
  Date Value Ref Range Status   08/25/2020 0.13 0.05 - 0.50 E9/L Final     Basophils Absolute   Date Value Ref Range Status   08/25/2020 0.04 0.00 - 0.20 E9/L Final       CMP  Sodium   Date Value Ref Range Status   10/04/2023 138 132 - 146 mmol/L Final     Potassium   Date Value Ref Range Status   10/04/2023 4.9 3.5 - 5.0 mmol/L Final     Chloride   Date Value Ref Range Status   10/04/2023 99 98 - 107 mmol/L Final     CO2   Date Value Ref Range Status   10/04/2023 19 (L) 22 - 29 mmol/L Final     Anion Gap   Date Value Ref Range Status   10/04/2023 20 (H) 7 - 16 mmol/L Final     Glucose   Date Value Ref Range Status   10/13/2022 177 (H) 74 - 99 mg/dL Final     BUN   Date Value Ref Range Status   10/04/2023 10 6 - 23 mg/dL Final     Creatinine   Date Value Ref Range Status   10/04/2023 0.6 0.50 - 1.00 mg/dL Final     GFR Non-   Date Value Ref Range Status   10/13/2022 >60 >=60 mL/min/1.73 Final     Comment:     Chronic Kidney Disease: less than 60 ml/min/1.73 sq.m.          Kidney Failure: less than 15 ml/min/1.73 sq.m.  Results valid for patients 18 years and older.       GFR    Date Value Ref Range Status   10/13/2022 >60  Final     Calcium   Date Value Ref Range Status   10/04/2023 9.3 8.6 - 10.2 mg/dL Final     Total Bilirubin   Date Value Ref Range Status   10/04/2023 0.3 0.0 - 1.2 mg/dL Final     Alkaline Phosphatase   Date Value Ref Range Status   10/04/2023 91 35 - 104 U/L Final     ALT   Date Value Ref Range Status   10/04/2023 18 0 - 32 U/L Final     AST   Date Value Ref Range Status   10/04/2023 18 0 - 31 U/L Final       TSH  Lab Results   Component Value Date    TSH 2.33 10/04/2023       A1C  Lab Results   Component Value Date    LABA1C 5.9 04/23/2024       LIPID  Lab Results   Component Value Date    CHOL 110 10/04/2023    TRIG 66 10/04/2023    HDL 34 (L) 10/04/2023    VLDL 13 10/04/2023     Results for POC orders placed in visit on 04/23/24   POCT glycosylated hemoglobin

## 2024-05-02 ENCOUNTER — TELEPHONE (OUTPATIENT)
Dept: PRIMARY CARE CLINIC | Age: 69
End: 2024-05-02

## 2024-05-02 NOTE — TELEPHONE ENCOUNTER
Left VM notifying patient of the following per Dr. Horton:    Labs reviewed.  Fasting blood sugar was 124.  Remainder the labs were normal.  Follow-up here 8/6/2024.     Left office number in case of any questions.

## 2024-05-02 NOTE — TELEPHONE ENCOUNTER
----- Message from Davi Horton DO sent at 5/2/2024  9:51 AM EDT -----  Labs reviewed.  Fasting blood sugar was 124.  Remainder the labs were normal.  Follow-up here 8/6/2024.

## 2024-05-03 DIAGNOSIS — E55.9 VITAMIN D DEFICIENCY: ICD-10-CM

## 2024-05-03 DIAGNOSIS — E03.9 ACQUIRED HYPOTHYROIDISM: ICD-10-CM

## 2024-05-03 DIAGNOSIS — E11.9 TYPE 2 DIABETES MELLITUS WITHOUT COMPLICATION, WITHOUT LONG-TERM CURRENT USE OF INSULIN (HCC): ICD-10-CM

## 2024-05-03 DIAGNOSIS — M76.892 CAPSULITIS OF LEFT HIP: ICD-10-CM

## 2024-05-06 ENCOUNTER — TELEPHONE (OUTPATIENT)
Dept: PRIMARY CARE CLINIC | Age: 69
End: 2024-05-06

## 2024-05-06 DIAGNOSIS — M16.12 PRIMARY OSTEOARTHRITIS OF LEFT HIP: Primary | ICD-10-CM

## 2024-05-07 ENCOUNTER — OFFICE VISIT (OUTPATIENT)
Dept: PAIN MANAGEMENT | Age: 69
End: 2024-05-07
Payer: MEDICARE

## 2024-05-07 VITALS
TEMPERATURE: 96.8 F | RESPIRATION RATE: 16 BRPM | HEART RATE: 97 BPM | BODY MASS INDEX: 26.36 KG/M2 | DIASTOLIC BLOOD PRESSURE: 70 MMHG | SYSTOLIC BLOOD PRESSURE: 150 MMHG | OXYGEN SATURATION: 98 % | HEIGHT: 66 IN | WEIGHT: 164 LBS

## 2024-05-07 DIAGNOSIS — M47.814 THORACIC SPONDYLOSIS: ICD-10-CM

## 2024-05-07 DIAGNOSIS — M41.9 SCOLIOSIS OF THORACIC SPINE, UNSPECIFIED SCOLIOSIS TYPE: ICD-10-CM

## 2024-05-07 DIAGNOSIS — M47.816 LUMBAR SPONDYLOSIS: ICD-10-CM

## 2024-05-07 DIAGNOSIS — M47.812 CERVICAL SPONDYLOSIS: ICD-10-CM

## 2024-05-07 PROCEDURE — 1123F ACP DISCUSS/DSCN MKR DOCD: CPT | Performed by: PAIN MEDICINE

## 2024-05-07 PROCEDURE — 99214 OFFICE O/P EST MOD 30 MIN: CPT

## 2024-05-07 PROCEDURE — 99214 OFFICE O/P EST MOD 30 MIN: CPT | Performed by: PAIN MEDICINE

## 2024-05-07 RX ORDER — MELOXICAM 15 MG/1
15 TABLET ORAL DAILY PRN
Qty: 30 TABLET | Refills: 0 | Status: SHIPPED | OUTPATIENT
Start: 2024-05-07 | End: 2024-06-06

## 2024-05-07 RX ORDER — HYDROCODONE BITARTRATE AND ACETAMINOPHEN 5; 325 MG/1; MG/1
1 TABLET ORAL DAILY PRN
Qty: 30 TABLET | Refills: 0 | Status: SHIPPED | OUTPATIENT
Start: 2024-05-07 | End: 2024-06-06

## 2024-05-07 NOTE — PROGRESS NOTES
Gabi Orourke presents to the [facility] on 5/7/2024. Gabi is complaining of pain in her low back. The pain is constant. The pain is described as aching, dull, and sharp. Pain is rated on her best day at a 5, on her worst day at a 9, and on average at a 6 on the VAS scale. She took her last dose of Mobic today.     Any procedures since your last visit: Yes, with 60 % relief.    Pacemaker or defibrillator: No     She is  on NSAIDS and is not on anticoagulation medications.    Medication Contract and Consent for Opioid Use Documents Filed        No documents found                    BP (!) 150/70   Pulse 97   Temp 96.8 °F (36 °C) (Infrared)   Resp 16   Ht 1.676 m (5' 6\")   Wt 74.4 kg (164 lb)   SpO2 98%   BMI 26.47 kg/m²      No LMP recorded. Patient has had a hysterectomy.  
for Pain 4/3/24 5/3/24  Neri Orantes MD     No Known Allergies    Social History     Socioeconomic History    Marital status:      Spouse name: Not on file    Number of children: Not on file    Years of education: Not on file    Highest education level: Not on file   Occupational History    Not on file   Tobacco Use    Smoking status: Former     Current packs/day: 0.00     Average packs/day: 0.5 packs/day for 20.0 years (10.0 ttl pk-yrs)     Types: Cigarettes     Start date: 1997     Quit date: 2017     Years since quittin.6    Smokeless tobacco: Never   Vaping Use    Vaping Use: Never used   Substance and Sexual Activity    Alcohol use: Never    Drug use: Never    Sexual activity: Defer   Other Topics Concern    Not on file   Social History Narrative    Not on file     Social Determinants of Health     Financial Resource Strain: Low Risk  (2024)    Overall Financial Resource Strain (CARDIA)     Difficulty of Paying Living Expenses: Not hard at all   Food Insecurity: No Food Insecurity (2024)    Hunger Vital Sign     Worried About Running Out of Food in the Last Year: Never true     Ran Out of Food in the Last Year: Never true   Transportation Needs: Unknown (2024)    PRAPARE - Transportation     Lack of Transportation (Medical): Not on file     Lack of Transportation (Non-Medical): No   Physical Activity: Inactive (2024)    Exercise Vital Sign     Days of Exercise per Week: 0 days     Minutes of Exercise per Session: 0 min   Stress: Not on file   Social Connections: Not on file   Intimate Partner Violence: Not on file   Housing Stability: Unknown (2024)    Housing Stability Vital Sign     Unable to Pay for Housing in the Last Year: Not on file     Number of Places Lived in the Last Year: Not on file     Unstable Housing in the Last Year: No     Family History   Problem Relation Age of Onset    Heart Disease Mother     Diabetes Mother     Cancer Father 52

## 2024-06-05 RX ORDER — MELOXICAM 15 MG/1
15 TABLET ORAL DAILY PRN
Qty: 30 TABLET | Refills: 0 | OUTPATIENT
Start: 2024-06-05

## 2024-06-10 RX ORDER — GLIPIZIDE 5 MG/1
TABLET, FILM COATED, EXTENDED RELEASE ORAL
Qty: 90 TABLET | Refills: 1 | Status: SHIPPED | OUTPATIENT
Start: 2024-06-10

## 2024-06-10 NOTE — TELEPHONE ENCOUNTER
Requested Prescriptions     Pending Prescriptions Disp Refills    glipiZIDE (GLUCOTROL XL) 5 MG extended release tablet [Pharmacy Med Name: GLIPIZIDE ER 5 MG TABLET] 90 tablet 1     Sig: TAKE 1 TABLET BY MOUTH EVERY DAY BEFORE BREAKFAST       Next appt is 8/6/2024  Last appt was 4/23/2024

## 2024-06-11 ENCOUNTER — OFFICE VISIT (OUTPATIENT)
Dept: PAIN MANAGEMENT | Age: 69
End: 2024-06-11
Payer: MEDICARE

## 2024-06-11 VITALS
HEIGHT: 66 IN | TEMPERATURE: 96.5 F | HEART RATE: 102 BPM | BODY MASS INDEX: 26.36 KG/M2 | RESPIRATION RATE: 18 BRPM | DIASTOLIC BLOOD PRESSURE: 58 MMHG | SYSTOLIC BLOOD PRESSURE: 96 MMHG | WEIGHT: 164 LBS | OXYGEN SATURATION: 96 %

## 2024-06-11 DIAGNOSIS — M25.552 PAIN IN LEFT HIP: ICD-10-CM

## 2024-06-11 DIAGNOSIS — M41.9 SCOLIOSIS OF THORACIC SPINE, UNSPECIFIED SCOLIOSIS TYPE: ICD-10-CM

## 2024-06-11 DIAGNOSIS — M47.814 THORACIC SPONDYLOSIS: ICD-10-CM

## 2024-06-11 DIAGNOSIS — M51.9 LUMBAR DISC DISORDER: ICD-10-CM

## 2024-06-11 DIAGNOSIS — M47.816 LUMBAR SPONDYLOSIS: ICD-10-CM

## 2024-06-11 DIAGNOSIS — G89.4 CHRONIC PAIN SYNDROME: Primary | ICD-10-CM

## 2024-06-11 DIAGNOSIS — M47.812 CERVICAL SPONDYLOSIS: ICD-10-CM

## 2024-06-11 DIAGNOSIS — M54.16 LUMBAR RADICULOPATHY: ICD-10-CM

## 2024-06-11 DIAGNOSIS — M47.812 CERVICAL FACET JOINT SYNDROME: ICD-10-CM

## 2024-06-11 PROCEDURE — 99213 OFFICE O/P EST LOW 20 MIN: CPT | Performed by: PAIN MEDICINE

## 2024-06-11 PROCEDURE — 99214 OFFICE O/P EST MOD 30 MIN: CPT | Performed by: PAIN MEDICINE

## 2024-06-11 PROCEDURE — 1123F ACP DISCUSS/DSCN MKR DOCD: CPT | Performed by: PAIN MEDICINE

## 2024-06-11 RX ORDER — HYDROCODONE BITARTRATE AND ACETAMINOPHEN 5; 325 MG/1; MG/1
1 TABLET ORAL DAILY PRN
Qty: 30 TABLET | Refills: 0 | Status: SHIPPED | OUTPATIENT
Start: 2024-06-11 | End: 2024-07-11

## 2024-06-11 RX ORDER — MELOXICAM 15 MG/1
15 TABLET ORAL DAILY PRN
Qty: 30 TABLET | Refills: 0 | Status: SHIPPED | OUTPATIENT
Start: 2024-06-11 | End: 2024-07-11

## 2024-06-11 NOTE — PROGRESS NOTES
Gabi Orourke presents to the [facility] on 6/11/2024. Gabi is complaining of pain in her lower back. The pain is constant. The pain is described as aching, dull, and sharp. Pain is rated on her best day at a 5, on her worst day at a 9, and on average at a 6 on the VAS scale. She took her last dose of Mobic and Norco 3 days ago.      Any procedures since your last visit: No    She is  on NSAIDS and  is not on anticoagulation medications to include none and is managed by NA.     Pacemaker or defibrillator: No    Medication Contract and Consent for Opioid Use Documents Filed        No documents found                       Resp 18   Ht 1.676 m (5' 5.98\")   Wt 74.4 kg (164 lb)   BMI 26.48 kg/m²      No LMP recorded. Patient has had a hysterectomy.  
MANAGEMENT PROCEDURE Bilateral 12/04/2023    Repeat bilateral L3,4,5 medial branch block. performed by Neri Orantes MD at [facility] OR    PAIN MANAGEMENT PROCEDURE Bilateral 12/28/2023    Bilateral L3,4,5 medial branch radiofrequency ablation SEDATION performed by Neri Orantes MD at [facility] OR    PAIN MANAGEMENT PROCEDURE Left 4/29/2024    Lumbar epidural steroid injection L5-S1 Left paramedian. performed by Neri Orantes MD at [facility] OR    US BREAST BIOPSY W LOC DEVICE 1ST LESION RIGHT  07/31/2020    US BREAST NEEDLE BIOPSY RIGHT 7/31/2020 [facility]    US I&D BREAST ABSCESS DEEP  02/02/2021    US I&D BREAST ABSCESS DEEP 2/2/2021     Prior to Admission medications    Medication Sig Start Date End Date Taking? Authorizing Provider   HYDROcodone-acetaminophen (NORCO) 5-325 MG per tablet Take 1 tablet by mouth daily as needed for Pain for up to 30 days. Max Daily Amount: 1 tablet 6/11/24 7/11/24 Yes Neri Orantes MD   meloxicam (MOBIC) 15 MG tablet Take 1 tablet by mouth daily as needed for Pain 6/11/24 7/11/24 Yes Neri Orantes MD   glipiZIDE (GLUCOTROL XL) 5 MG extended release tablet TAKE 1 TABLET BY MOUTH EVERY DAY BEFORE BREAKFAST 6/10/24  Yes Davi Horton DO   OZEMPIC, 0.25 OR 0.5 MG/DOSE, 2 MG/3ML SOPN INJECT 0.5 MG INTO THE SKIN ONE TIME PER WEEK 4/5/24  Yes Provider, MD Isaac   metFORMIN (GLUCOPHAGE) 500 MG tablet TAKE 2 TABLETS BY MOUTH TWICE A DAY 12/20/23  Yes Davi Horton DO   levothyroxine (SYNTHROID) 25 MCG tablet TAKE 1 TABLET BY MOUTH EVERY DAY 12/20/23  Yes Davi Horton DO   atorvastatin (LIPITOR) 20 MG tablet TAKE 1 TABLET BY MOUTH EVERY DAY 12/20/23  Yes Davi Horton DO   D3-50 1.25 MG (19858 UT) CAPS TAKE 1 CAPSULE BY MOUTH ONE TIME PER WEEK  Patient taking differently: Take 1 capsule by mouth once a week Every Sunday 11/13/23  Yes Clifford, Davi S, DO   blood glucose test strips (ONETOUCH ULTRA) strip CHECK BLOOD SUGAR ONCE DAILY 10/16/23  Yes

## 2024-06-17 RX ORDER — LEVOTHYROXINE SODIUM 0.03 MG/1
TABLET ORAL
Qty: 90 TABLET | Refills: 1 | Status: SHIPPED | OUTPATIENT
Start: 2024-06-17

## 2024-06-17 RX ORDER — ATORVASTATIN CALCIUM 20 MG/1
TABLET, FILM COATED ORAL
Qty: 90 TABLET | Refills: 1 | Status: SHIPPED | OUTPATIENT
Start: 2024-06-17

## 2024-07-09 ENCOUNTER — OFFICE VISIT (OUTPATIENT)
Dept: PAIN MANAGEMENT | Age: 69
End: 2024-07-09
Payer: MEDICARE

## 2024-07-09 VITALS
HEART RATE: 99 BPM | TEMPERATURE: 96.9 F | HEIGHT: 66 IN | SYSTOLIC BLOOD PRESSURE: 120 MMHG | OXYGEN SATURATION: 96 % | DIASTOLIC BLOOD PRESSURE: 62 MMHG | BODY MASS INDEX: 26.36 KG/M2 | RESPIRATION RATE: 18 BRPM | WEIGHT: 164 LBS

## 2024-07-09 DIAGNOSIS — M50.90 CERVICAL DISC DISORDER: ICD-10-CM

## 2024-07-09 DIAGNOSIS — M47.812 CERVICAL FACET JOINT SYNDROME: ICD-10-CM

## 2024-07-09 DIAGNOSIS — M47.816 LUMBAR SPONDYLOSIS: ICD-10-CM

## 2024-07-09 DIAGNOSIS — M47.814 THORACIC SPONDYLOSIS: ICD-10-CM

## 2024-07-09 DIAGNOSIS — M25.552 PAIN IN LEFT HIP: ICD-10-CM

## 2024-07-09 DIAGNOSIS — M47.812 CERVICAL SPONDYLOSIS: ICD-10-CM

## 2024-07-09 DIAGNOSIS — M54.16 LUMBAR RADICULOPATHY: ICD-10-CM

## 2024-07-09 DIAGNOSIS — M51.9 LUMBAR DISC DISORDER: ICD-10-CM

## 2024-07-09 DIAGNOSIS — Z79.891 ENCOUNTER FOR LONG-TERM OPIATE ANALGESIC USE: ICD-10-CM

## 2024-07-09 DIAGNOSIS — M41.9 SCOLIOSIS OF THORACIC SPINE, UNSPECIFIED SCOLIOSIS TYPE: ICD-10-CM

## 2024-07-09 DIAGNOSIS — G89.4 CHRONIC PAIN SYNDROME: Primary | ICD-10-CM

## 2024-07-09 PROCEDURE — 1123F ACP DISCUSS/DSCN MKR DOCD: CPT | Performed by: PAIN MEDICINE

## 2024-07-09 PROCEDURE — 99213 OFFICE O/P EST LOW 20 MIN: CPT | Performed by: PAIN MEDICINE

## 2024-07-09 PROCEDURE — 99214 OFFICE O/P EST MOD 30 MIN: CPT | Performed by: PAIN MEDICINE

## 2024-07-09 RX ORDER — MELOXICAM 15 MG/1
15 TABLET ORAL DAILY PRN
Qty: 30 TABLET | Refills: 0 | Status: SHIPPED | OUTPATIENT
Start: 2024-07-09 | End: 2024-08-08

## 2024-07-09 RX ORDER — HYDROCODONE BITARTRATE AND ACETAMINOPHEN 5; 325 MG/1; MG/1
1 TABLET ORAL DAILY PRN
Qty: 30 TABLET | Refills: 0 | Status: SHIPPED | OUTPATIENT
Start: 2024-07-11 | End: 2024-08-10

## 2024-07-09 NOTE — PROGRESS NOTES
Gabi Orourke presents to the [facility] on 7/9/2024. Gabi is complaining of pain in her lower back. The pain is constant. The pain is described as aching, dull, and sharp. Pain is rated on her best day at a 5, on her worst day at a 9, and on average at a 6 on the VAS scale. She took her last dose of Mobic and Norco yesterday.      Any procedures since your last visit: No    She is  on NSAIDS and  is not on anticoagulation medications to include none and is managed by NA.     Pacemaker or defibrillator: No     Medication Contract and Consent for Opioid Use Documents Filed        No documents found                       Ht 1.676 m (5' 5.98\")   Wt 74.4 kg (164 lb)   BMI 26.48 kg/m²      No LMP recorded. Patient has had a hysterectomy.  
bilaterally upper and lower  Range of motion:pain with internal rotation of hips positive left  Intact:Yes  Edema:Normal      Neurological:     Sensory:normal to light touch bilateral upper and lower extremities.  Motor:                 Right Quadriceps5/5          Left Quadriceps5/5           Right Gastrocnemius5/5    Left Gastrocnemius5/5  Right Ant Tibialis5/5  Left Ant Tibialis5/5  Right Quadriceps reflex2+  Left Quadriceps reflex2+  Right Achilles reflex2+  Left Achilles reflex2+  Gait:antalgic with a cane     Dermatology:     Skin:no unusual rashes and no skin lesions     Impression:  Chronic neck/mid and low back pain with radiation to the Left upper and lower extremities with h/o cai neida surgery.  Lumbar spine MRI mild multilevel degenerative disc disease  Good outcome with bilateral L3,4,5 MB RFA.  Plan:  Follow up on her low back pain with radiation to the Left lower extremity with no acute issues.  Independently reviewed lumbar spine MRI and Left hip Xray and discussed with the patient.  Will refer patient to neurosurgery for second opinion.  Continue with Norco 5/325 QD PRN(maintaining daily activities with no signs of abuse or diversion).  Continue with Mobic 15 mg QD PRN.  OARRS report reviewed 07/2024/opioid agreement up to date.  Patient encouraged to stay active and to lose weight.  Treatment plan discussed with the patient including medications side effects.    We discussed with the patient that combining opioids, benzodiazepines, alcohol, illicit drugs or sleep aids increases the risk of respiratory depression including death. We discussed that these medications may cause drowsiness, sedation or dizziness and have counseled the patient not to drive or operate machinery. We have discussed that these medications will be prescribed only by one provider. We have discussed with the patient about age related risk factors and have thoroughly discussed the importance of taking these medications as

## 2024-08-06 ENCOUNTER — OFFICE VISIT (OUTPATIENT)
Dept: PRIMARY CARE CLINIC | Age: 69
End: 2024-08-06

## 2024-08-06 VITALS
WEIGHT: 159 LBS | DIASTOLIC BLOOD PRESSURE: 62 MMHG | OXYGEN SATURATION: 100 % | HEART RATE: 98 BPM | TEMPERATURE: 98.2 F | HEIGHT: 66 IN | BODY MASS INDEX: 25.55 KG/M2 | SYSTOLIC BLOOD PRESSURE: 118 MMHG

## 2024-08-06 DIAGNOSIS — E03.9 ACQUIRED HYPOTHYROIDISM: ICD-10-CM

## 2024-08-06 DIAGNOSIS — M41.9 SCOLIOSIS, UNSPECIFIED SCOLIOSIS TYPE, UNSPECIFIED SPINAL REGION: ICD-10-CM

## 2024-08-06 DIAGNOSIS — E55.9 VITAMIN D DEFICIENCY: ICD-10-CM

## 2024-08-06 DIAGNOSIS — M53.9 MULTILEVEL DEGENERATIVE DISC DISEASE: ICD-10-CM

## 2024-08-06 DIAGNOSIS — M65.9 SYNOVITIS OF LEFT KNEE: ICD-10-CM

## 2024-08-06 DIAGNOSIS — E11.9 TYPE 2 DIABETES MELLITUS WITHOUT COMPLICATION, WITHOUT LONG-TERM CURRENT USE OF INSULIN (HCC): Primary | ICD-10-CM

## 2024-08-06 LAB — HBA1C MFR BLD: 6.1 %

## 2024-08-06 ASSESSMENT — ENCOUNTER SYMPTOMS
ABDOMINAL PAIN: 0
RHINORRHEA: 0
COLOR CHANGE: 0
BACK PAIN: 1
EYE ITCHING: 0
SINUS PRESSURE: 0
VOMITING: 0
FACIAL SWELLING: 0
EYE PAIN: 0
WHEEZING: 0
SHORTNESS OF BREATH: 0
NAUSEA: 0
COUGH: 0
BLOOD IN STOOL: 0
DIARRHEA: 0
PHOTOPHOBIA: 0
EYE DISCHARGE: 0
SORE THROAT: 0
ABDOMINAL DISTENTION: 0
CONSTIPATION: 0

## 2024-08-06 NOTE — PROGRESS NOTES
Gabi Orourke (:  1955) is a 68 y.o. female,Established patient, here for evaluation of the following chief complaint(s):  3 Month Follow-Up (A1c 6.1   Pain Management would like pt to see a neurologist and she prefers . Ranulfo Chris MD)      Assessment & Plan   ASSESSMENT/PLAN:  1. Type 2 diabetes mellitus without complication, without long-term current use of insulin (HCC)  -     POCT glycosylated hemoglobin (Hb A1C)  -     CBC; Future  -     Comprehensive Metabolic Panel, Fasting; Future  -     Lipid Panel; Future  2. Scoliosis, unspecified scoliosis type, unspecified spinal region  -     External Referral To Neurosurgery  3. Multilevel degenerative disc disease  -     External Referral To Neurosurgery  4. Synovitis of left knee  -     XR KNEE LEFT (3 VIEWS); Future  5. Vitamin D deficiency  -     Vitamin D 25 Hydroxy; Future  6. Acquired hypothyroidism  -     TSH; Future      PLAN:  Colonoscopy report from 3/1/2022 reviewed.  Follow-up 5 years.  Continue to follow with [facility] regarding breast cancer.  Neuro surgery referral placed.  Dr. Chris, [facility]  Labs ordered  X-ray left hip reviewed revealing moderate arthritis.   Issued a handicap parking placard letter.  Patient was advised that knee discomfort may possibly be related to lumbar radiculopathy or referred pain from her left hip osteoarthritis.  Left knee x-ray ordered.  RTO 6 months    Return in about 6 months (around 2025) for Follow up.         Subjective   SUBJECTIVE/OBJECTIVE:  Patient is here for routine 3-month follow-up.  She complains of left knee pain and is asking for a possible injection.  She is asking for a neurosurgical consultation in [place].  She has a name of a specialist that she was to consult with.  She has seen orthopedic spine specialist at [facility] in the past.  She feels she is being undermedicated with Norco at this point in time.  She feels much better now that sugar is better controlled.        Review of

## 2024-08-08 RX ORDER — MELOXICAM 15 MG/1
15 TABLET ORAL DAILY PRN
Qty: 30 TABLET | Refills: 0 | OUTPATIENT
Start: 2024-08-08

## 2024-08-08 RX ORDER — MELOXICAM 15 MG/1
15 TABLET ORAL DAILY PRN
Qty: 30 TABLET | Refills: 0 | Status: SHIPPED | OUTPATIENT
Start: 2024-08-08 | End: 2024-09-07

## 2024-08-09 ENCOUNTER — OFFICE VISIT (OUTPATIENT)
Dept: PAIN MANAGEMENT | Age: 69
End: 2024-08-09
Payer: MEDICARE

## 2024-08-09 VITALS
DIASTOLIC BLOOD PRESSURE: 56 MMHG | HEIGHT: 66 IN | BODY MASS INDEX: 25.55 KG/M2 | OXYGEN SATURATION: 93 % | TEMPERATURE: 96.6 F | RESPIRATION RATE: 18 BRPM | HEART RATE: 112 BPM | SYSTOLIC BLOOD PRESSURE: 114 MMHG | WEIGHT: 159 LBS

## 2024-08-09 DIAGNOSIS — M25.552 PAIN IN LEFT HIP: ICD-10-CM

## 2024-08-09 DIAGNOSIS — M47.814 THORACIC SPONDYLOSIS: ICD-10-CM

## 2024-08-09 DIAGNOSIS — M47.812 CERVICAL FACET JOINT SYNDROME: ICD-10-CM

## 2024-08-09 DIAGNOSIS — G89.4 CHRONIC PAIN SYNDROME: Primary | ICD-10-CM

## 2024-08-09 DIAGNOSIS — M50.90 CERVICAL DISC DISORDER: ICD-10-CM

## 2024-08-09 DIAGNOSIS — M54.16 LUMBAR RADICULOPATHY: ICD-10-CM

## 2024-08-09 DIAGNOSIS — M47.816 LUMBAR SPONDYLOSIS: ICD-10-CM

## 2024-08-09 DIAGNOSIS — Z79.891 ENCOUNTER FOR LONG-TERM OPIATE ANALGESIC USE: ICD-10-CM

## 2024-08-09 DIAGNOSIS — M41.9 SCOLIOSIS OF THORACIC SPINE, UNSPECIFIED SCOLIOSIS TYPE: ICD-10-CM

## 2024-08-09 DIAGNOSIS — M47.812 CERVICAL SPONDYLOSIS: ICD-10-CM

## 2024-08-09 DIAGNOSIS — M51.9 LUMBAR DISC DISORDER: ICD-10-CM

## 2024-08-09 PROCEDURE — 99213 OFFICE O/P EST LOW 20 MIN: CPT | Performed by: PAIN MEDICINE

## 2024-08-09 PROCEDURE — 1123F ACP DISCUSS/DSCN MKR DOCD: CPT | Performed by: PAIN MEDICINE

## 2024-08-09 PROCEDURE — 99214 OFFICE O/P EST MOD 30 MIN: CPT | Performed by: PAIN MEDICINE

## 2024-08-09 RX ORDER — SODIUM CHLORIDE 0.9 % (FLUSH) 0.9 %
5-40 SYRINGE (ML) INJECTION EVERY 12 HOURS SCHEDULED
OUTPATIENT
Start: 2024-08-09

## 2024-08-09 RX ORDER — SODIUM CHLORIDE 9 MG/ML
INJECTION, SOLUTION INTRAVENOUS PRN
OUTPATIENT
Start: 2024-08-09

## 2024-08-09 RX ORDER — SODIUM CHLORIDE 0.9 % (FLUSH) 0.9 %
5-40 SYRINGE (ML) INJECTION PRN
OUTPATIENT
Start: 2024-08-09

## 2024-08-09 RX ORDER — HYDROCODONE BITARTRATE AND ACETAMINOPHEN 5; 325 MG/1; MG/1
1 TABLET ORAL DAILY PRN
Qty: 30 TABLET | Refills: 0 | Status: SHIPPED | OUTPATIENT
Start: 2024-08-11 | End: 2024-09-10

## 2024-08-09 NOTE — PROGRESS NOTES
[facility]  [address], [place] [address]  [phone]    Follow up Note      Gabi Orourke     Date of Visit:  8/9/2024    CC:  Patient presents for follow up   Chief Complaint   Patient presents with    Back Pain       HPI:  Worsening low back with radiation to the Left lower extremity.  Pain is described as sharp/shooting/constant and is aggravated by standing/walking.  Appropriate analgesia with current medications regimen:yes    Change in quality of symptoms:no.    Medication side effects:none  Recent diagnostic testing:none  Recent interventional procedures: none    She has not been on anticoagulation medications to include none. The patient  has not been on herbal supplements.  The patient is diabetic.     Imaging:   Lumbar spine MRI 2024  1. Degenerative change.  Slight grade 1 anterolisthesis at L4-5 and slight  grade 1 retrolisthesis at L5-S1.  Mild levoscoliosis.  2. Mild central canal stenosis at L4-5.  3. Multilevel neural foraminal stenosis, most prominent (moderate to severe)  on the left at L5-S1.  4. Small left-sided disc protrusion at L1-2 causing moderate left neural  foraminal stenosis and slight impression on the left subarticular recess.  Lumbar spine MRI 2020:  L1/L2   There is normal alignment and vertebral body height. The disc space   is normal. There is no evidence of canal or foraminal narrowing.   There is no evidence of bulging or herniated disc.   L2/L3   There is normal alignment and vertebral body height. The disc space   is normal. There is no evidence of canal or foraminal narrowing.   There is no evidence of bulging or herniated disc.   L3/L4   Asymmetrical bulging disc to the left with circumferentially oriented   annular tear. No canal or foraminal narrowing.   L4/L5   Trace spondylolisthesis and facet hypertrophy without canal stenosis.   Mild bilateral foraminal narrowing.   L5/S1   Asymmetrical bulging disc and/or

## 2024-08-09 NOTE — PROGRESS NOTES
Gabi Orourke presents to the [facility] on 8/9/2024. Gabi is complaining of pain lower back radiates to bilateral hips and left leg. The pain is constant. The pain is described as aching and dull. Pain is rated on her best day at a 5, on her worst day at a 9, and on average at a 7 on the VAS scale. She took her last dose of Mobic and Norco today.      Any procedures since your last visit: No  She is  on NSAIDS and  is not on anticoagulation medications to include     Pacemaker or defibrillator: No   Medication Contract and Consent for Opioid Use Documents Filed        No documents found                       There were no vitals taken for this visit.     No LMP recorded. Patient has had a hysterectomy.   No

## 2024-08-14 ENCOUNTER — TELEPHONE (OUTPATIENT)
Dept: PAIN MANAGEMENT | Age: 69
End: 2024-08-14

## 2024-08-14 DIAGNOSIS — M47.814 THORACIC SPONDYLOSIS: ICD-10-CM

## 2024-08-14 DIAGNOSIS — M47.812 CERVICAL SPONDYLOSIS: ICD-10-CM

## 2024-08-14 DIAGNOSIS — M41.9 SCOLIOSIS OF THORACIC SPINE, UNSPECIFIED SCOLIOSIS TYPE: ICD-10-CM

## 2024-08-14 DIAGNOSIS — M47.816 LUMBAR SPONDYLOSIS: ICD-10-CM

## 2024-08-14 RX ORDER — SEMAGLUTIDE 0.68 MG/ML
INJECTION, SOLUTION SUBCUTANEOUS
Qty: 3 ML | Refills: 5 | Status: SHIPPED | OUTPATIENT
Start: 2024-08-14

## 2024-08-14 RX ORDER — HYDROCODONE BITARTRATE AND ACETAMINOPHEN 5; 325 MG/1; MG/1
1 TABLET ORAL DAILY PRN
Qty: 30 TABLET | Refills: 0 | Status: SHIPPED | OUTPATIENT
Start: 2024-08-14 | End: 2024-09-13

## 2024-08-14 NOTE — TELEPHONE ENCOUNTER
Mois[facility] @ [facility] has been out of stock. Would it be possible to re send to [facility]? I called there and they have it. She called and suggested  Norco 10 to break in half. Please advise

## 2024-08-21 ENCOUNTER — TELEPHONE (OUTPATIENT)
Dept: PAIN MANAGEMENT | Age: 69
End: 2024-08-21

## 2024-08-21 NOTE — TELEPHONE ENCOUNTER
Call to Gabi Orourke that procedure was scheduled for 08/26/2024 and that [facility] should call her a few days before for the pre op call and after 3:00 PM the business day before with the arrival time. Instructed Gabi to hold ibuprofen for 24 hours, Celebrex, Mobic, and naprosyn for 4 days and any aspirin containing products, CoQ 10, or fish oil for 7 days. Instructed to call office back if any questions. Gabi verbalized understanding.    Electronically signed by Vicky Guerra RN on 8/21/2024 at 10:38 AM

## 2024-08-26 ENCOUNTER — TELEPHONE (OUTPATIENT)
Dept: PAIN MANAGEMENT | Age: 69
End: 2024-08-26

## 2024-08-26 NOTE — TELEPHONE ENCOUNTER
Call to Gabi Orourke that procedure was rescheduled for 09/09/2024 and that [facility] should call her a few days before for the pre op call and after 3:00 PM the business day before with the arrival time. Instructed Gabi to hold ibuprofen for 24 hours, Celebrex, Mobic, and naprosyn for 4 days and any aspirin containing products, CoQ 10, or fish oil for 7 days. Instructed to call office back if any questions. Gabi verbalized understanding.    Electronically signed by Vicky Guerra RN on 8/26/2024 at 9:30 AM

## 2024-08-26 NOTE — TELEPHONE ENCOUNTER
Phone call received from patient stating she needs to reschedule her procedure today (8/26/24) due to illness. Patient rescheduled to 9/9. Tiara at [facility] & scheduling notified.

## 2024-08-27 ENCOUNTER — TELEPHONE (OUTPATIENT)
Dept: PAIN MANAGEMENT | Age: 69
End: 2024-08-27

## 2024-08-27 NOTE — TELEPHONE ENCOUNTER
Gabi Orourke called into the office requesting a refill of Norco. It was last filled on 08/14/2024 for a 30 day supply per the OARRS report. The OARRS report is  consistent. Their last office visit was on 08/09/2024. Their next office visit is scheduled for 09/18/2024. The reason they need a refill before their next appointment is because her procedure was rescheduled due to illness.

## 2024-08-27 NOTE — TELEPHONE ENCOUNTER
She can call when she is getting closer to running low on her norco.  She is not due until 9/13 and then will give her enough to get to her appt on 9/18.  Thank you!

## 2024-09-05 ENCOUNTER — TELEPHONE (OUTPATIENT)
Dept: PRIMARY CARE CLINIC | Age: 69
End: 2024-09-05

## 2024-09-05 DIAGNOSIS — M41.9 SCOLIOSIS, UNSPECIFIED SCOLIOSIS TYPE, UNSPECIFIED SPINAL REGION: Primary | ICD-10-CM

## 2024-09-05 DIAGNOSIS — M53.9 MULTILEVEL DEGENERATIVE DISC DISEASE: ICD-10-CM

## 2024-09-05 NOTE — TELEPHONE ENCOUNTER
PC from pt, stated a referral to neurology was placed to [facility].    Pt stated since she is seeing other providers at , she would like a referral to Neurosurgery to  instead of [facility]    Pt stated she does not have a preference .    Please advise.

## 2024-09-05 NOTE — TELEPHONE ENCOUNTER
Referral placed to [facility] for neurosurgery consultation.  Apparently Dr. Orantes had already placed a referral 7/9/2024.

## 2024-09-06 NOTE — TELEPHONE ENCOUNTER
Referral placed to [facility].      Pt notified and voiced understanding.  Gave pt information (address, phone number, date of appt)    Appt scheduled for 9/17/24 @ 10am   [id number]   [address]  [facility]

## 2024-09-07 LAB
ALBUMIN: 4.1 G/DL
ALP BLD-CCNC: 82 U/L
ALT SERPL-CCNC: 18 U/L
AST SERPL-CCNC: 20 U/L
BASOPHILS ABSOLUTE: NORMAL
BASOPHILS RELATIVE PERCENT: NORMAL
BILIRUB SERPL-MCNC: 0.3 MG/DL (ref 0.1–1.4)
BUN BLDV-MCNC: 6 MG/DL
CALCIUM SERPL-MCNC: 9.8 MG/DL
CHLORIDE BLD-SCNC: 99 MMOL/L
CHOLESTEROL, TOTAL: 104 MG/DL
CHOLESTEROL/HDL RATIO: 2.81
CO2: 27 MMOL/L
CREAT SERPL-MCNC: 0.6 MG/DL
EOSINOPHILS ABSOLUTE: NORMAL
EOSINOPHILS RELATIVE PERCENT: NORMAL
GLUCOSE FASTING: 114 MG/DL
HCT VFR BLD CALC: 44.1 % (ref 36–46)
HDLC SERPL-MCNC: 37 MG/DL (ref 35–70)
HEMOGLOBIN: 14.3 G/DL (ref 12–16)
LDL CHOLESTEROL: 47
LYMPHOCYTES ABSOLUTE: NORMAL
LYMPHOCYTES RELATIVE PERCENT: NORMAL
MCH RBC QN AUTO: 32.1 PG
MCHC RBC AUTO-ENTMCNC: 32.4 G/DL
MCV RBC AUTO: 98.9 FL
MONOCYTES ABSOLUTE: NORMAL
MONOCYTES RELATIVE PERCENT: NORMAL
NEUTROPHILS ABSOLUTE: NORMAL
NEUTROPHILS RELATIVE PERCENT: NORMAL
NONHDLC SERPL-MCNC: 67 MG/DL
PLATELET # BLD: 309 K/ΜL
PMV BLD AUTO: 9.9 FL
POTASSIUM SERPL-SCNC: 4.4 MMOL/L
RBC # BLD: 4.46 10^6/ΜL
SODIUM BLD-SCNC: 138 MMOL/L
TOTAL PROTEIN: 7.1 G/DL (ref 6.4–8.2)
TRIGL SERPL-MCNC: 101 MG/DL
TSH SERPL DL<=0.05 MIU/L-ACNC: 2.43 UIU/ML
VITAMIN D 25-HYDROXY: 68.3
VITAMIN D2, 25 HYDROXY: NORMAL
VITAMIN D3,25 HYDROXY: NORMAL
VLDLC SERPL CALC-MCNC: 20 MG/DL
WBC # BLD: 7.73 10^3/ML

## 2024-09-09 ENCOUNTER — HOSPITAL ENCOUNTER (OUTPATIENT)
Dept: OPERATING ROOM | Age: 69
Setting detail: OUTPATIENT SURGERY
Discharge: HOME | End: 2024-09-09
Attending: PAIN MEDICINE
Payer: MEDICARE

## 2024-09-09 ENCOUNTER — HOSPITAL ENCOUNTER (OUTPATIENT)
Age: 69
Setting detail: OUTPATIENT SURGERY
Discharge: HOME | End: 2024-09-09
Attending: PAIN MEDICINE | Admitting: PAIN MEDICINE
Payer: MEDICARE

## 2024-09-09 VITALS
SYSTOLIC BLOOD PRESSURE: 124 MMHG | WEIGHT: 158 LBS | OXYGEN SATURATION: 95 % | DIASTOLIC BLOOD PRESSURE: 60 MMHG | HEIGHT: 66 IN | RESPIRATION RATE: 14 BRPM | TEMPERATURE: 98.1 F | BODY MASS INDEX: 25.39 KG/M2 | HEART RATE: 92 BPM

## 2024-09-09 DIAGNOSIS — M54.16 LUMBAR RADICULOPATHY: ICD-10-CM

## 2024-09-09 PROCEDURE — 6360000002 HC RX W HCPCS: Performed by: PAIN MEDICINE

## 2024-09-09 PROCEDURE — 2709999900 HC NON-CHARGEABLE SUPPLY: Performed by: PAIN MEDICINE

## 2024-09-09 PROCEDURE — 62323 NJX INTERLAMINAR LMBR/SAC: CPT | Performed by: PAIN MEDICINE

## 2024-09-09 PROCEDURE — 7100000011 HC PHASE II RECOVERY - ADDTL 15 MIN: Performed by: PAIN MEDICINE

## 2024-09-09 PROCEDURE — 6360000004 HC RX CONTRAST MEDICATION: Performed by: PAIN MEDICINE

## 2024-09-09 PROCEDURE — 2500000003 HC RX 250 WO HCPCS: Performed by: PAIN MEDICINE

## 2024-09-09 PROCEDURE — 7100000010 HC PHASE II RECOVERY - FIRST 15 MIN: Performed by: PAIN MEDICINE

## 2024-09-09 PROCEDURE — 3600000005 HC SURGERY LEVEL 5 BASE: Performed by: PAIN MEDICINE

## 2024-09-09 RX ORDER — SODIUM CHLORIDE 0.9 % (FLUSH) 0.9 %
5-40 SYRINGE (ML) INJECTION PRN
Status: DISCONTINUED | OUTPATIENT
Start: 2024-09-09 | End: 2024-09-09 | Stop reason: HOSPADM

## 2024-09-09 RX ORDER — LIDOCAINE HYDROCHLORIDE 5 MG/ML
INJECTION, SOLUTION INFILTRATION; INTRAVENOUS PRN
Status: DISCONTINUED | OUTPATIENT
Start: 2024-09-09 | End: 2024-09-09 | Stop reason: ALTCHOICE

## 2024-09-09 RX ORDER — SODIUM CHLORIDE 0.9 % (FLUSH) 0.9 %
5-40 SYRINGE (ML) INJECTION EVERY 12 HOURS SCHEDULED
Status: DISCONTINUED | OUTPATIENT
Start: 2024-09-09 | End: 2024-09-09 | Stop reason: HOSPADM

## 2024-09-09 RX ORDER — SODIUM CHLORIDE 9 MG/ML
INJECTION, SOLUTION INTRAVENOUS PRN
Status: DISCONTINUED | OUTPATIENT
Start: 2024-09-09 | End: 2024-09-09 | Stop reason: HOSPADM

## 2024-09-09 ASSESSMENT — PAIN DESCRIPTION - DESCRIPTORS: DESCRIPTORS: ACHING

## 2024-09-09 ASSESSMENT — PAIN - FUNCTIONAL ASSESSMENT
PAIN_FUNCTIONAL_ASSESSMENT: 0-10
PAIN_FUNCTIONAL_ASSESSMENT: NONE - DENIES PAIN
PAIN_FUNCTIONAL_ASSESSMENT: NONE - DENIES PAIN

## 2024-09-11 RX ORDER — MELOXICAM 15 MG/1
15 TABLET ORAL DAILY PRN
Qty: 30 TABLET | Refills: 0 | Status: SHIPPED | OUTPATIENT
Start: 2024-09-11 | End: 2024-10-11

## 2024-09-11 RX ORDER — MELOXICAM 15 MG/1
15 TABLET ORAL DAILY PRN
Qty: 30 TABLET | Refills: 0 | OUTPATIENT
Start: 2024-09-11

## 2024-09-16 ENCOUNTER — TELEPHONE (OUTPATIENT)
Dept: PRIMARY CARE CLINIC | Age: 69
End: 2024-09-16

## 2024-09-16 DIAGNOSIS — E55.9 VITAMIN D DEFICIENCY: ICD-10-CM

## 2024-09-16 DIAGNOSIS — E03.9 ACQUIRED HYPOTHYROIDISM: ICD-10-CM

## 2024-09-16 DIAGNOSIS — E11.9 TYPE 2 DIABETES MELLITUS WITHOUT COMPLICATION, WITHOUT LONG-TERM CURRENT USE OF INSULIN (HCC): ICD-10-CM

## 2024-09-16 RX ORDER — MELOXICAM 15 MG/1
15 TABLET ORAL DAILY PRN
Qty: 30 TABLET | Refills: 0 | OUTPATIENT
Start: 2024-09-16

## 2024-09-17 ENCOUNTER — APPOINTMENT (OUTPATIENT)
Dept: ORTHOPEDIC SURGERY | Facility: CLINIC | Age: 69
End: 2024-09-17
Payer: MEDICARE

## 2024-09-18 ENCOUNTER — TELEPHONE (OUTPATIENT)
Dept: PAIN MANAGEMENT | Age: 69
End: 2024-09-18

## 2024-09-18 ENCOUNTER — OFFICE VISIT (OUTPATIENT)
Dept: PAIN MANAGEMENT | Age: 69
End: 2024-09-18
Payer: MEDICARE

## 2024-09-18 VITALS
TEMPERATURE: 96.2 F | HEIGHT: 66 IN | OXYGEN SATURATION: 96 % | SYSTOLIC BLOOD PRESSURE: 122 MMHG | BODY MASS INDEX: 25.39 KG/M2 | HEART RATE: 85 BPM | WEIGHT: 158 LBS | RESPIRATION RATE: 18 BRPM | DIASTOLIC BLOOD PRESSURE: 58 MMHG

## 2024-09-18 DIAGNOSIS — M50.90 CERVICAL DISC DISORDER: Primary | ICD-10-CM

## 2024-09-18 DIAGNOSIS — M25.552 PAIN IN LEFT HIP: ICD-10-CM

## 2024-09-18 DIAGNOSIS — M47.812 CERVICAL SPONDYLOSIS: ICD-10-CM

## 2024-09-18 DIAGNOSIS — M47.812 CERVICAL FACET JOINT SYNDROME: ICD-10-CM

## 2024-09-18 DIAGNOSIS — M47.814 THORACIC SPONDYLOSIS: ICD-10-CM

## 2024-09-18 DIAGNOSIS — M41.9 SCOLIOSIS OF THORACIC SPINE, UNSPECIFIED SCOLIOSIS TYPE: ICD-10-CM

## 2024-09-18 DIAGNOSIS — M47.816 LUMBAR SPONDYLOSIS: ICD-10-CM

## 2024-09-18 DIAGNOSIS — M16.0 PRIMARY OSTEOARTHRITIS OF BOTH HIPS: ICD-10-CM

## 2024-09-18 DIAGNOSIS — M54.16 LUMBAR RADICULOPATHY: ICD-10-CM

## 2024-09-18 DIAGNOSIS — G89.4 CHRONIC PAIN SYNDROME: ICD-10-CM

## 2024-09-18 DIAGNOSIS — M51.9 LUMBAR DISC DISORDER: ICD-10-CM

## 2024-09-18 PROCEDURE — 99214 OFFICE O/P EST MOD 30 MIN: CPT

## 2024-09-18 PROCEDURE — 99214 OFFICE O/P EST MOD 30 MIN: CPT | Performed by: PAIN MEDICINE

## 2024-09-18 PROCEDURE — 1123F ACP DISCUSS/DSCN MKR DOCD: CPT | Performed by: PAIN MEDICINE

## 2024-09-18 RX ORDER — SODIUM CHLORIDE 9 MG/ML
INJECTION, SOLUTION INTRAVENOUS PRN
OUTPATIENT
Start: 2024-09-18

## 2024-09-18 RX ORDER — SODIUM CHLORIDE 0.9 % (FLUSH) 0.9 %
5-40 SYRINGE (ML) INJECTION EVERY 12 HOURS SCHEDULED
OUTPATIENT
Start: 2024-09-18

## 2024-09-18 RX ORDER — HYDROCODONE BITARTRATE AND ACETAMINOPHEN 5; 325 MG/1; MG/1
1 TABLET ORAL DAILY PRN
Qty: 30 TABLET | Refills: 0 | Status: SHIPPED | OUTPATIENT
Start: 2024-09-18 | End: 2024-10-18

## 2024-09-18 RX ORDER — SODIUM CHLORIDE 0.9 % (FLUSH) 0.9 %
5-40 SYRINGE (ML) INJECTION PRN
OUTPATIENT
Start: 2024-09-18

## 2024-09-23 ENCOUNTER — HOSPITAL ENCOUNTER (OUTPATIENT)
Age: 69
Setting detail: OUTPATIENT SURGERY
Discharge: HOME | End: 2024-09-23
Attending: PAIN MEDICINE | Admitting: PAIN MEDICINE
Payer: MEDICARE

## 2024-09-23 ENCOUNTER — HOSPITAL ENCOUNTER (OUTPATIENT)
Dept: OPERATING ROOM | Age: 69
Setting detail: OUTPATIENT SURGERY
Discharge: HOME | End: 2024-09-23
Attending: PAIN MEDICINE
Payer: MEDICARE

## 2024-09-23 VITALS
BODY MASS INDEX: 26.33 KG/M2 | DIASTOLIC BLOOD PRESSURE: 71 MMHG | HEART RATE: 96 BPM | WEIGHT: 158 LBS | OXYGEN SATURATION: 95 % | HEIGHT: 65 IN | RESPIRATION RATE: 16 BRPM | SYSTOLIC BLOOD PRESSURE: 144 MMHG | TEMPERATURE: 97.2 F

## 2024-09-23 DIAGNOSIS — M16.0 PRIMARY OSTEOARTHRITIS OF BOTH HIPS: ICD-10-CM

## 2024-09-23 PROCEDURE — 3600000005 HC SURGERY LEVEL 5 BASE: Performed by: PAIN MEDICINE

## 2024-09-23 PROCEDURE — 20610 DRAIN/INJ JOINT/BURSA W/O US: CPT | Performed by: PAIN MEDICINE

## 2024-09-23 PROCEDURE — 7100000010 HC PHASE II RECOVERY - FIRST 15 MIN: Performed by: PAIN MEDICINE

## 2024-09-23 PROCEDURE — 7100000011 HC PHASE II RECOVERY - ADDTL 15 MIN: Performed by: PAIN MEDICINE

## 2024-09-23 PROCEDURE — 2500000003 HC RX 250 WO HCPCS: Performed by: PAIN MEDICINE

## 2024-09-23 PROCEDURE — 2709999900 HC NON-CHARGEABLE SUPPLY: Performed by: PAIN MEDICINE

## 2024-09-23 PROCEDURE — 6360000004 HC RX CONTRAST MEDICATION: Performed by: PAIN MEDICINE

## 2024-09-23 PROCEDURE — 6360000002 HC RX W HCPCS: Performed by: PAIN MEDICINE

## 2024-09-23 RX ORDER — LIDOCAINE HYDROCHLORIDE 5 MG/ML
INJECTION, SOLUTION INFILTRATION; INTRAVENOUS PRN
Status: DISCONTINUED | OUTPATIENT
Start: 2024-09-23 | End: 2024-09-23 | Stop reason: ALTCHOICE

## 2024-09-23 RX ORDER — SODIUM CHLORIDE 9 MG/ML
INJECTION, SOLUTION INTRAVENOUS PRN
Status: DISCONTINUED | OUTPATIENT
Start: 2024-09-23 | End: 2024-09-23 | Stop reason: HOSPADM

## 2024-09-23 RX ORDER — SODIUM CHLORIDE 0.9 % (FLUSH) 0.9 %
5-40 SYRINGE (ML) INJECTION PRN
Status: DISCONTINUED | OUTPATIENT
Start: 2024-09-23 | End: 2024-09-23 | Stop reason: HOSPADM

## 2024-09-23 RX ORDER — SODIUM CHLORIDE 0.9 % (FLUSH) 0.9 %
5-40 SYRINGE (ML) INJECTION EVERY 12 HOURS SCHEDULED
Status: DISCONTINUED | OUTPATIENT
Start: 2024-09-23 | End: 2024-09-23 | Stop reason: HOSPADM

## 2024-09-23 ASSESSMENT — PAIN DESCRIPTION - DESCRIPTORS: DESCRIPTORS: ACHING

## 2024-09-25 ENCOUNTER — APPOINTMENT (OUTPATIENT)
Dept: ORTHOPEDIC SURGERY | Facility: CLINIC | Age: 69
End: 2024-09-25
Payer: MEDICARE

## 2024-09-26 ENCOUNTER — TELEPHONE (OUTPATIENT)
Dept: NEUROSURGERY | Facility: HOSPITAL | Age: 69
End: 2024-09-26
Payer: MEDICARE

## 2024-10-01 ENCOUNTER — APPOINTMENT (OUTPATIENT)
Dept: ORTHOPEDIC SURGERY | Facility: CLINIC | Age: 69
End: 2024-10-01
Payer: MEDICARE

## 2024-10-07 NOTE — PROGRESS NOTES
It was a pleasure to see Ms. Alatorre at the Neurosurgery Spine Clinic at [facility].     She is a really nice 68 y.o. female  who presents to us with complaints primarily axial LOWER BACK pain radiates to the LEFT LEG that started about  10  years  ago, and have been gradually worsening since that time.  The symptoms started after no known injury    The pain is 9 /10. The pain is described as burning, sharp, stabbing, and tingling and occurs all day.  Symptoms are exacerbated by standing and walking. Factors which relieve the pain include nothing      Numbness and/or tingling - YES left leg    Weakness : YES Left leg    Bladder/Bowel symptoms - NO    The patient has tried medications (eg: gabapentin, NSAIDS and narcotics ) : Yes  PT : No  Pain Management with ESIs/selective nerve blocks  - YES    she is a NON-SMOKER and DIABETIC    History of Depression : NO    PRIOR SPINE SURGERY: YES    Denies use of Aspirin, Coumadin, or Plavix or any other anticoagulants     NARCOTICS for pain : YES    Part of this patient’s history is from personal review of the patient’s previous charts.      Past Medical History:   Diagnosis Date    Breast cancer (Multi) 02/11/2021    Other conditions influencing health status 03/12/2021    Seroma, postoperative    Personal history of irradiation     Personal history of other diseases of the musculoskeletal system and connective tissue 10/29/2020    History of scoliosis    Personal history of other endocrine, nutritional and metabolic disease 10/29/2020    History of diabetes mellitus           Current Outpatient Medications:     ammonium lactate (Lac-Hydrin) 12 % lotion, APPLY TO SKIN AND NAILS ON RIGHT AND LEFT FOOT DAILY AS DIRECTED, Disp: , Rfl:     anastrozole (Arimidex) 1 mg tablet, Take 1 tablet (1 mg total) by mouth once daily., Disp: 90 tablet, Rfl: 3    atorvastatin (Lipitor) 10 mg tablet, Take 1 tablet (10 mg) by mouth once daily., Disp: , Rfl:     atorvastatin  (Lipitor) 20 mg tablet, Take 1 tablet (20 mg) by mouth once daily., Disp: , Rfl:     gabapentin (Gralise) 600 mg tablet extended release 24 hr, Take by mouth. PLEASE SEE ATTACHED FOR DETAILED DIRECTIONS, Disp: , Rfl:     HYDROcodone-acetaminophen (Norco) 5-325 mg tablet, Take 1 tablet by mouth once daily. 5 MG, Disp: , Rfl:     levothyroxine (Synthroid, Levoxyl) 25 mcg tablet, Take 1 tablet (25 mcg) by mouth once daily., Disp: , Rfl:     metFORMIN (Glucophage) 1,000 mg tablet, Take by mouth., Disp: , Rfl:     OneTouch Ultra Test strip, once daily. test, Disp: , Rfl:     traMADol (Ultram) 50 mg tablet, Take 1-2 tablets ( mg) by mouth 4 times a day as needed. For pain, Disp: , Rfl:       Review of Systems :   Constitutional: No fever or chills  Respiratory: No shortness of breath or wheezing  Musculoskeletal: see HPI above   Neurologic: See HPI above      EXAM:   NEURO: Neurologically patient is awake alert and oriented X 3    No obvious cranial nerve deficit.  Strength is almost 5/5 throughout all motor groups tested with no asymmetric significant motor deficit.  Deep tendon reflexes are symmetric throughout.   Gait : WNL   Sensory examination is intact to touch and painful stimuli.  There is no obvious sagittal or coronal malalignment at least clinically.      IMAGING:   MRI of the LUMBAR spine that was done at  and available to review on the PACS was personally evaluated and shows presence of age-related degenerative changes but no evidence of any significant nerve root compression.  There is evidence of some artifact from the previous Hernandez ney that she had placed for her AIS surgery.    ASSESSMENT AND PLAN:  Ms. Alatorre is a really nice 68 y.o. female who has had previous Hernandez ney placed for AIS when she was a kid.  She has been doing well from that but over the past 10 years has been having axial back pain.  Clinically she does not have any evidence of coronal or sagittal malalignment  that seems clinically significant.  Her MRI does not shows any evidence of nerve root impingement that can be decompressed via any small surgery.  I discussed with her the MRI findings and also the various possible etiology of her back pain and do not recommend any surgical intervention at this point.  She can continue with physical therapy and pain management as necessary if she feels any of that is helping her.    It was a pleasure to participate in Ms. Alatorre clinical care. All questions were answered to her satisfaction and she explained understanding of the further treatment plan.     Jose Carlos Hernandez MD, [facility]   of Neurological Surgery  [facility]  Attending Surgeon  Director - Minimally Invasive Spine Surgery  [place], [place]      ---Some of this note was completed using Dragon voice recognition technology and sometimes the software misinterprets words. This may include unintended errors with respect to translation of words, typographical errors or grammar errors which may not have been identified prior to finalization of the chart note. Please take this into account when reading this note---                 Jos eCarlos Hernandez MD, [facility]   of Neurological Surgery  [facility]  Attending Surgeon  Director - Minimally Invasive Spine Surgery  [place], [place]      Some of this note was completed using Dragon voice recognition technology and sometimes the software misinterprets words. This may include unintended errors with respect to translation of words, typographical errors or grammar errors which may not have been identified prior to finalization of the chart note. Please take this into account when reading this note

## 2024-10-08 ENCOUNTER — OFFICE VISIT (OUTPATIENT)
Dept: NEUROSURGERY | Facility: CLINIC | Age: 69
End: 2024-10-08
Payer: MEDICARE

## 2024-10-08 ENCOUNTER — APPOINTMENT (OUTPATIENT)
Dept: NEUROSURGERY | Facility: CLINIC | Age: 69
End: 2024-10-08
Payer: MEDICARE

## 2024-10-08 VITALS
WEIGHT: 154 LBS | HEART RATE: 91 BPM | SYSTOLIC BLOOD PRESSURE: 117 MMHG | TEMPERATURE: 96.9 F | BODY MASS INDEX: 24.75 KG/M2 | DIASTOLIC BLOOD PRESSURE: 73 MMHG | HEIGHT: 66 IN

## 2024-10-08 DIAGNOSIS — M53.9 DORSOPATHY, UNSPECIFIED: ICD-10-CM

## 2024-10-08 DIAGNOSIS — M54.50 CHRONIC LOW BACK PAIN WITHOUT SCIATICA, UNSPECIFIED BACK PAIN LATERALITY: Primary | ICD-10-CM

## 2024-10-08 DIAGNOSIS — G89.29 CHRONIC LOW BACK PAIN WITHOUT SCIATICA, UNSPECIFIED BACK PAIN LATERALITY: Primary | ICD-10-CM

## 2024-10-08 DIAGNOSIS — M41.9 SCOLIOSIS, UNSPECIFIED: ICD-10-CM

## 2024-10-08 PROCEDURE — 99203 OFFICE O/P NEW LOW 30 MIN: CPT | Performed by: NEUROLOGICAL SURGERY

## 2024-10-08 PROCEDURE — 1159F MED LIST DOCD IN RCRD: CPT | Performed by: NEUROLOGICAL SURGERY

## 2024-10-08 PROCEDURE — 1125F AMNT PAIN NOTED PAIN PRSNT: CPT | Performed by: NEUROLOGICAL SURGERY

## 2024-10-08 PROCEDURE — 99213 OFFICE O/P EST LOW 20 MIN: CPT | Performed by: NEUROLOGICAL SURGERY

## 2024-10-08 PROCEDURE — 1036F TOBACCO NON-USER: CPT | Performed by: NEUROLOGICAL SURGERY

## 2024-10-08 PROCEDURE — 3008F BODY MASS INDEX DOCD: CPT | Performed by: NEUROLOGICAL SURGERY

## 2024-10-08 RX ORDER — CHOLECALCIFEROL (VITAMIN D3) 1250 MCG
1 CAPSULE ORAL WEEKLY
Qty: 12 CAPSULE | Refills: 3 | Status: SHIPPED | OUTPATIENT
Start: 2024-10-08

## 2024-10-08 ASSESSMENT — PAIN SCALES - GENERAL: PAINLEVEL: 5

## 2024-10-12 NOTE — PROGRESS NOTES
Patient ID: [place] Alatorre is a 68 y.o. female.  BREAST CANCER DIAGNOSIS:   Breast   AJCC Edition: 8th (AJCC), Diagnosis Date: Sep 2020, Stage(no match), cT1b cN1 cM0 G2    Treatment Synopsis:    BREAST CANCER DIAGNOSIS  cT1N1 ER+(99%)/CT+(40%)/HER2 negative right sided breast cancer w/biopsy proven axillary involvement, low risk mammaprint, luminal A    - 7/31/20 mammographic abnormality on Right following many years of no mammographic screening.  She had not had a mammogram in years    - 8/6/20 Ultrasound guided biopsy at [facility] in [place], [place] found to have Invasive ductal carcinoma with biopsy @ 11:00, also with focal mucinous features. ER+(99%)/CT+(40%)/HER2 negative. Axillary biopsy ipsilateral positive for breast cancer.     -9/18/20  staging scans which were negative for any definitive evidence of distant metastases. Borderline  hilar and mediastinal nodes, largest measuring 1.3cm which require f/u.    -9/25/20 Medical oncology consult at  with Dr. Steve Negrete. Tissue to be tested for mammaprint.  Initiated for neoadjuvant endocrine therapy w/anastrozole. Mammaprint low risk luminal A    - 10/2020 Surgical oncology consult with Dr. Remi Glover    -2/15/21 Right breast lumpectomy after magseed localization, Right axillary sentinel node biopsy with Dr. Remi Glover    - 4/29/21--6/4/21 50 Gy in 25 Fx R tangs followed by boost (1000 cGy) radiation therapy x 30 treatments with Dr. Marques Cruz      Past Medical History: [place] has a past medical history of Arthritis, Breast cancer (Multi) (02/11/2021), Diabetes mellitus (Multi), Frozen shoulder, Low back pain, Lumbosacral disc disease, Other conditions influencing health status (03/12/2021), Personal history of irradiation, Personal history of other diseases of the musculoskeletal system and connective tissue (10/29/2020), Personal history of other endocrine, nutritional and metabolic disease (10/29/2020), Scoliosis,  Spinal stenosis, and Spondylolisthesis.    Social History:  3 sons. They live locally to [place].    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 Quit Smoking approx . 35 year hx 1ppd,  still smokes.   Family History:    Family History   Problem Relation Name Age of Onset    Other (cardiac disorder) Mother Angella Kelsey     Diabetes Mother Angella Kelsey     Other (cardiac disorder) Father Krunal Kelsey     Other (malignant neoplasm of esophagus) Father Krunal Kelsey     Scoliosis Father Krunal Low     Cancer Father Krunal Kelsey     Diabetes Maternal Grandmother Pamela Montano     Other (malignant neoplasm of stomach) Maternal Grandfather Jorge Montano     Diabetes Maternal Grandfather Jorge Montano     Other (malignant neoplasm of oral cavity) Paternal Grandmother Tiffanie Kelsey     Cancer Paternal Grandmother Tiffanie Kelsey     Breast cancer Mother's Sister      Breast cancer Other maternal cousin     Other (malignant neoplasm of esophagus) Other maternal cousin     Breast cancer Father's Sister       Family Oncology History:  Cancer-related family history includes Breast cancer in her father's sister, mother's sister, and another family member; Cancer in her father and paternal grandmother.      BREAST CANCER DIAGNOSIS  [id number] ER+/KS+/HER2- breast cancer luminal A, low risk Mammaprint      CURRENT THERAPY  has been on anastrozole since 2020-- initially preoperative, then adjuvant.  Bisphosphonate therapy with Zometa initiated 10/29/21    HISTORY OF PRESENT ILLNESS:  [place] Alatorre is a 68 y.o. female who comes today for breast cancer treatment follow-up, surveillance. Today I have reviewed with the patient I will be conducting a clinical physical breast exam. Patient has declined a second medical professional today duirng the exam as a chapperone.      Continued compliance with daily anastrazole. She denies any breast  cancer concerns but reports recent left breast soreness x 3 weeks. Denies injury to breast, fever/chills, recent vaccines. She frequently cares for her grandchildren and states it may have pulled during lifting a child. She states the tenderness is intermittent, does not wake her up at night, and she has never needed medication for the discomfort.     She continues on ozempic for DM per PCP without issues. Reports fatigue is improved now. Reports sleep is stable.     She denies any chest pain or breathing issues, sob. She continues with baseline dry cough since her previous covid infections, this is chronic for her.     She denies any vision changes or headache issues, dizziness or loss of balance or falls. She states she is getting cataract surgery next month.     She reports baseline issues with left shoulder pain- has established with Dr Beatty. Does water aerobics + chair yoga. She has baseline back pain from previous spinal fusion and hardware- is on Norco every day for this and is established with MD Mary. She denies any new concerning bone aches or bone pains.     She continues Vit D weekly high dose supplementation for many years through her PCP.     She denies any skin lesions or masses, oral sores lesions or infections. Continues to use right arm lymphedema sleeve and glove at night and has no issues. She is compliant with massage. She reports unchanged intermittent mild swelling under her left eye, non tender and comes and goes, was seen by eye dr, negative scans.     She reports a normal appetite but often eats smaller more frequent meals. She reports normal bowel movements. She reports normal urination.     Review of Systems - Oncology  ROS 14 points performed, See HPI for exceptions    OBJECTIVE:    VS / Pain:  /72 (BP Location: Right arm, Patient Position: Sitting, BP Cuff Size: Adult)   Pulse 97   Temp 37 °C (98.6 °F) (Temporal)   Wt 69.4 kg (153 lb)   BMI 24.71 kg/m²   BSA: 1.8  meters squared   Pain Scale: 5      Physical Exam  Constitutional: Well developed, awake/alert/oriented x4, no distress, alert and cooperative  EYES: Sclera clear  ENMT: mucous membranes moist, no apparent injury, no lesions seen  Head/Neck: Neck supple, no apparent injury, thyroid without mass or tenderness, No JVD, trachea midline, no bruits. No apparent swelling under left eye, no swelling of lacrimal sac  Respiratory / Thoracic: Patent airways, clear to all lobes, normal breath sounds with good chest expansion, thorax symmetric.  Cardiovascular: Regular, rate and rhythm, no murmurs, 2+ equal pulses of the extremities, normal auscultated S 1and S 2  GI: Nondistended, soft, non-tender, no rebound tenderness or guarding, no masses palpable, no organomegaly, +BS, no bruits  Musculoskeletal: ROM intact, no joint swelling, normal strength, no spinal tenderness. Positive for left sided rib pain upon deep palpation.   Extremities: normal extremities, no cyanosis edema, contusions or wounds, no clubbing  Neurological: alert and oriented x4, intact senses, motor, response and reflexes, normal strength  Breast: Left chest and axilla supple, free of masses or lesions. Right chest s/p lumpectomy with well healed incision- chest and axilla with mild skin thickening around nipple s/p RT; no evidence of chest or axilla masses or lesions bilaterally   Lymphatic: No cervical, supraclavicular, infraclavicular or axillary lymphadenopathy  Psychological: Appropriate and talkative mood and behavior  Skin: Warm and dry, no lesions, no rashes, no jaundice    Diagnostic Results   Narrative & Impression  Interpreted By:  Clary Rosales,   STUDY:  BI MAMMO BILATERAL SCREENING TOMOSYNTHESIS;  10/16/2024 11:06 am      ACCESSION NUMBER(S):  [id number]      ORDERING CLINICIAN:  ELOY AARON      INDICATION:  Screening. Right lumpectomy with radiation. Family history of breast  cancer.      ,C50.411 Malignant neoplasm of upper-outer  quadrant of right female  breast,Z17.0 Estrogen receptor positive status (ER+)      COMPARISON:  10/10/2023 and 10/04/2022.      FINDINGS:  2D and tomosynthesis images were reviewed at 1 mm slice thickness.      Density:  The breasts are heterogeneously dense, which may obscure  small masses.      Postsurgical scarring with associated clips is again seen in the  superior lateral right breast at mid to posterior depth. Right. Other  skin thickening persists. No suspicious masses or calcifications are  identified.      IMPRESSION:  Stable right breast post treatment changes. No mammographic evidence  of malignancy in either breast.      BI-RADS CATEGORY:  BI-RADS Category:  2 Benign.  Recommendation:  Annual Screening.  Recommended Date:  1 Year.  Laterality:  Bilateral.       Assessment/Plan   Malignant neoplasm of upper-outer quadrant of right female breast, Clinical: Stage IB (cT1b, cN1, cM0, G2, ER+, MO+, HER2-)  Diagnoses and all orders for this visit:  Menopause (Primary)  Malignant neoplasm of upper-outer quadrant of right breast in female, estrogen receptor positive  -     Clinic Appointment Request Follow up; ELOY AARON  Encounter for monitoring anastrozole therapy  Personal history of irradiation  Encounter for follow-up surveillance of breast cancer      Problem List Items Addressed This Visit       Malignant neoplasm of upper-outer quadrant of right female breast    Encounter for monitoring anastrozole therapy    Personal history of irradiation    Encounter for follow-up surveillance of breast cancer    Menopause - Primary        [id number] ER+/MO+/HER2- breast cancer luminal A, low risk Mammaprint.   [place] is doing well today, continues to tolerate anastrazole since 9/2020. Goal is 5 years -Sept 2025    There is no evidence on clinical exam today for breast cancer recurrence. Ordered placed for left rib xray due to pain upon palpation (she originally described this as left breast pain as  documented in ROS). I will call her with these results. Plan to RTC April 2025.     Osteopenia.   Zometa initiated in October 2021 without any major issues. Final dose completed  #6 May 2024.    Repeat DEXA 3/2024 Osteopenia T-score -1.7. Will repeat Summer 2026      Lymphedema.   Stable right arm and hand lymph swelling. She is performing self massage Right arm and breast, compliant with compression garments at night       General health maintenance   Continues to follow with Dr. Mota, annually and as needed.     At least 30 minutes of direct consultation was spent with the patient today reviewing her cancer care plan, educating and answering questions regarding ongoing follow up, greater than 50% in counseling and coordination of care      Treatment Plan:  Venous Access Orders  Zoledronic Acid (Zometa), Every 24 Weeks      Thank you for the opportunity to be involved in the care of [place].   We discussed the clinical significance of diagnosis, goals of care and treatment plan in detail.  Please do not hesitate to reach out with any questions. Thank you.     ----------------------------------------------------------------------------------------------------------------------------    Latha Tapia MSN, APRN, FNP-C  [facility]  Division of Medical Oncology- Breast   Collaborating Physician Dr. Steve Negrete   [facility] [place] [address]  Phone: [phone]  Fax: [phone]  Available via Secure Chat    Confidential Peer Review Document-  Privilege  Privileged Pursuant to [place] Revised Code Section 2305.24, .25, .251 & .252

## 2024-10-16 ENCOUNTER — OFFICE VISIT (OUTPATIENT)
Dept: PAIN MANAGEMENT | Age: 69
End: 2024-10-16
Payer: MEDICARE

## 2024-10-16 ENCOUNTER — HOSPITAL ENCOUNTER (OUTPATIENT)
Dept: RADIOLOGY | Facility: CLINIC | Age: 69
Discharge: HOME | End: 2024-10-16
Payer: MEDICARE

## 2024-10-16 ENCOUNTER — HOSPITAL ENCOUNTER (OUTPATIENT)
Dept: RADIOLOGY | Facility: HOSPITAL | Age: 69
Discharge: HOME | End: 2024-10-16
Payer: MEDICARE

## 2024-10-16 ENCOUNTER — OFFICE VISIT (OUTPATIENT)
Dept: HEMATOLOGY/ONCOLOGY | Facility: CLINIC | Age: 69
End: 2024-10-16
Payer: MEDICARE

## 2024-10-16 VITALS
HEART RATE: 103 BPM | BODY MASS INDEX: 26.33 KG/M2 | WEIGHT: 158 LBS | DIASTOLIC BLOOD PRESSURE: 60 MMHG | SYSTOLIC BLOOD PRESSURE: 120 MMHG | TEMPERATURE: 96.6 F | RESPIRATION RATE: 18 BRPM | HEIGHT: 65 IN | OXYGEN SATURATION: 96 %

## 2024-10-16 VITALS
TEMPERATURE: 98.6 F | WEIGHT: 153 LBS | HEART RATE: 97 BPM | SYSTOLIC BLOOD PRESSURE: 144 MMHG | DIASTOLIC BLOOD PRESSURE: 72 MMHG | BODY MASS INDEX: 24.71 KG/M2

## 2024-10-16 VITALS — HEIGHT: 66 IN | BODY MASS INDEX: 24.77 KG/M2 | WEIGHT: 154.1 LBS

## 2024-10-16 DIAGNOSIS — M41.9 SCOLIOSIS OF THORACIC SPINE, UNSPECIFIED SCOLIOSIS TYPE: ICD-10-CM

## 2024-10-16 DIAGNOSIS — Z51.81 ENCOUNTER FOR MONITORING ANASTROZOLE THERAPY: ICD-10-CM

## 2024-10-16 DIAGNOSIS — C50.411 MALIGNANT NEOPLASM OF UPPER-OUTER QUADRANT OF RIGHT BREAST IN FEMALE, ESTROGEN RECEPTOR POSITIVE: ICD-10-CM

## 2024-10-16 DIAGNOSIS — M47.814 THORACIC SPONDYLOSIS: ICD-10-CM

## 2024-10-16 DIAGNOSIS — Z85.3 ENCOUNTER FOR FOLLOW-UP SURVEILLANCE OF BREAST CANCER: ICD-10-CM

## 2024-10-16 DIAGNOSIS — R07.81 RIB PAIN ON LEFT SIDE: ICD-10-CM

## 2024-10-16 DIAGNOSIS — Z79.891 ADMISSION FOR LONG-TERM OPIATE ANALGESIC USE: ICD-10-CM

## 2024-10-16 DIAGNOSIS — Z78.0 MENOPAUSE: ICD-10-CM

## 2024-10-16 DIAGNOSIS — M47.812 CERVICAL SPONDYLOSIS: ICD-10-CM

## 2024-10-16 DIAGNOSIS — M25.552 PAIN IN LEFT HIP: ICD-10-CM

## 2024-10-16 DIAGNOSIS — M50.90 CERVICAL DISC DISORDER: ICD-10-CM

## 2024-10-16 DIAGNOSIS — M47.816 LUMBAR SPONDYLOSIS: ICD-10-CM

## 2024-10-16 DIAGNOSIS — M54.16 LUMBAR RADICULOPATHY: ICD-10-CM

## 2024-10-16 DIAGNOSIS — M51.9 LUMBAR DISC DISORDER: ICD-10-CM

## 2024-10-16 DIAGNOSIS — Z17.0 MALIGNANT NEOPLASM OF UPPER-OUTER QUADRANT OF RIGHT BREAST IN FEMALE, ESTROGEN RECEPTOR POSITIVE: ICD-10-CM

## 2024-10-16 DIAGNOSIS — Z08 ENCOUNTER FOR FOLLOW-UP SURVEILLANCE OF BREAST CANCER: ICD-10-CM

## 2024-10-16 DIAGNOSIS — Z79.811 ENCOUNTER FOR MONITORING ANASTROZOLE THERAPY: ICD-10-CM

## 2024-10-16 DIAGNOSIS — M16.0 PRIMARY OSTEOARTHRITIS OF BOTH HIPS: Primary | ICD-10-CM

## 2024-10-16 DIAGNOSIS — G89.4 CHRONIC PAIN SYNDROME: ICD-10-CM

## 2024-10-16 DIAGNOSIS — M47.812 CERVICAL FACET JOINT SYNDROME: ICD-10-CM

## 2024-10-16 DIAGNOSIS — Z92.3 PERSONAL HISTORY OF IRRADIATION: ICD-10-CM

## 2024-10-16 LAB
SEND OUT REPORT: NORMAL
TEST NAME: NORMAL

## 2024-10-16 PROCEDURE — 77063 BREAST TOMOSYNTHESIS BI: CPT | Performed by: RADIOLOGY

## 2024-10-16 PROCEDURE — 77067 SCR MAMMO BI INCL CAD: CPT | Performed by: RADIOLOGY

## 2024-10-16 PROCEDURE — 99214 OFFICE O/P EST MOD 30 MIN: CPT | Performed by: PAIN MEDICINE

## 2024-10-16 PROCEDURE — 71101 X-RAY EXAM UNILAT RIBS/CHEST: CPT | Mod: LT

## 2024-10-16 PROCEDURE — 71100 X-RAY EXAM RIBS UNI 2 VIEWS: CPT | Mod: LT

## 2024-10-16 PROCEDURE — 99214 OFFICE O/P EST MOD 30 MIN: CPT

## 2024-10-16 PROCEDURE — 77063 BREAST TOMOSYNTHESIS BI: CPT

## 2024-10-16 PROCEDURE — 1123F ACP DISCUSS/DSCN MKR DOCD: CPT | Performed by: PAIN MEDICINE

## 2024-10-16 PROCEDURE — 99215 OFFICE O/P EST HI 40 MIN: CPT | Performed by: NURSE PRACTITIONER

## 2024-10-16 RX ORDER — HYDROCODONE BITARTRATE AND ACETAMINOPHEN 5; 325 MG/1; MG/1
1 TABLET ORAL DAILY PRN
Qty: 30 TABLET | Refills: 0 | Status: SHIPPED | OUTPATIENT
Start: 2024-10-17 | End: 2024-11-16

## 2024-10-16 RX ORDER — ANASTROZOLE 1 MG/1
1 TABLET ORAL DAILY
Qty: 90 TABLET | Refills: 3 | Status: SHIPPED | OUTPATIENT
Start: 2024-10-16 | End: 2025-10-16

## 2024-10-16 ASSESSMENT — PATIENT HEALTH QUESTIONNAIRE - PHQ9
2. FEELING DOWN, DEPRESSED OR HOPELESS: NOT AT ALL
1. LITTLE INTEREST OR PLEASURE IN DOING THINGS: NOT AT ALL
SUM OF ALL RESPONSES TO PHQ9 QUESTIONS 1 & 2: 0

## 2024-10-16 ASSESSMENT — PAIN SCALES - GENERAL: PAINLEVEL_OUTOF10: 0-NO PAIN

## 2024-10-16 NOTE — PATIENT INSTRUCTIONS
Latha LUCERO, CNP follow-up 6 months Spring 2025. Mammogram today is normal.      Bone density repeat 3/2024 with continued moderate osteopenia. Will repeat Summer 2026     Anastrozole will completed at 5 years Sept 2025. Prescription updated today for a full year.       PCP Dr Mota annually and as needed.      Call with any questions, concerns or treatment intolerance prior to next office visit [phone].

## 2024-10-16 NOTE — PROGRESS NOTES
Gabi Orourke presents to the [facility] on 10/16/2024. Gabi is complaining of pain lower back. The pain is constant. The pain is described as stabbing and sharp. Pain is rated on her best day at a 5, on her worst day at a 6, and on average at a 5 on the VAS scale. She took her last dose of Mobic and Norco today.      Any procedures since your last visit: Yes, with 60 % relief.    She is  on NSAIDS and  is not on anticoagulation medications   Pacemaker or defibrillator: No   Medication Contract and Consent for Opioid Use Documents Filed        No documents found                       Resp 18   Ht 1.651 m (5' 5\")   Wt 71.7 kg (158 lb)   BMI 26.29 kg/m²      No LMP recorded. Patient has had a hysterectomy.

## 2024-10-16 NOTE — PROGRESS NOTES
[facility]  [address], [place] [address]  [phone]    Follow up Note      Gabi Orourke     Date of Visit:  10/16/2024    CC:  Patient presents for follow up   Chief Complaint   Patient presents with    Follow-up     Left hip injection under Xray       HPI:  Follow up on her  low back with radiation to the Left lower extremity with no acute issues  Appropriate analgesia with current medications regimen:yes    Change in quality of symptoms:no.    Medication side effects:none  Recent diagnostic testing:none  Recent interventional procedures:Left hip injection with more than 50% improvement in her pain,     She has not been on anticoagulation medications to include none. The patient  has not been on herbal supplements.  The patient is diabetic.     Imaging:   Lumbar spine MRI 2024  1. Degenerative change.  Slight grade 1 anterolisthesis at L4-5 and slight  grade 1 retrolisthesis at L5-S1.  Mild levoscoliosis.  2. Mild central canal stenosis at L4-5.  3. Multilevel neural foraminal stenosis, most prominent (moderate to severe)  on the left at L5-S1.  4. Small left-sided disc protrusion at L1-2 causing moderate left neural  foraminal stenosis and slight impression on the left subarticular recess.  Lumbar spine MRI 2020:  L1/L2   There is normal alignment and vertebral body height. The disc space   is normal. There is no evidence of canal or foraminal narrowing.   There is no evidence of bulging or herniated disc.   L2/L3   There is normal alignment and vertebral body height. The disc space   is normal. There is no evidence of canal or foraminal narrowing.   There is no evidence of bulging or herniated disc.   L3/L4   Asymmetrical bulging disc to the left with circumferentially oriented   annular tear. No canal or foraminal narrowing.   L4/L5   Trace spondylolisthesis and facet hypertrophy without canal stenosis.   Mild bilateral foraminal narrowing.   L5/S1

## 2024-10-17 ENCOUNTER — TELEPHONE (OUTPATIENT)
Dept: HEMATOLOGY/ONCOLOGY | Facility: HOSPITAL | Age: 69
End: 2024-10-17

## 2024-10-17 NOTE — TELEPHONE ENCOUNTER
Call to patient to review normal x-rays of ribs on left side chest. We have reviewed she likely has a pulled muscle and to call me back with any increase or worsening of sx.

## 2024-10-29 LAB
SEND OUT REPORT: NORMAL
TEST NAME: NORMAL

## 2024-11-11 ENCOUNTER — TELEPHONE (OUTPATIENT)
Dept: PAIN MANAGEMENT | Age: 69
End: 2024-11-11

## 2024-11-11 DIAGNOSIS — M47.814 THORACIC SPONDYLOSIS: ICD-10-CM

## 2024-11-11 DIAGNOSIS — M47.816 LUMBAR SPONDYLOSIS: ICD-10-CM

## 2024-11-11 DIAGNOSIS — M47.812 CERVICAL SPONDYLOSIS: ICD-10-CM

## 2024-11-11 DIAGNOSIS — M41.9 SCOLIOSIS OF THORACIC SPINE, UNSPECIFIED SCOLIOSIS TYPE: ICD-10-CM

## 2024-11-11 RX ORDER — HYDROCODONE BITARTRATE AND ACETAMINOPHEN 5; 325 MG/1; MG/1
1 TABLET ORAL DAILY PRN
Qty: 30 TABLET | Refills: 0 | Status: SHIPPED | OUTPATIENT
Start: 2024-11-11 | End: 2024-12-11

## 2024-11-14 RX ORDER — MELOXICAM 15 MG/1
15 TABLET ORAL DAILY PRN
Qty: 30 TABLET | Refills: 2 | Status: SHIPPED | OUTPATIENT
Start: 2024-11-14 | End: 2025-02-12

## 2024-12-03 RX ORDER — GLIPIZIDE 5 MG/1
TABLET, FILM COATED, EXTENDED RELEASE ORAL
Qty: 90 TABLET | Refills: 1 | Status: SHIPPED | OUTPATIENT
Start: 2024-12-03

## 2024-12-03 NOTE — TELEPHONE ENCOUNTER
Requested Prescriptions     Pending Prescriptions Disp Refills    glipiZIDE (GLUCOTROL XL) 5 MG extended release tablet [Pharmacy Med Name: GLIPIZIDE ER 5 MG TABLET] 90 tablet 1     Sig: TAKE 1 TABLET BY MOUTH EVERY DAY BEFORE BREAKFAST       Next appt is 2/11/2025  Last appt was 8/6/2024    Opioid Counseling: I discussed with the patient the potential side effects of opioids including but not limited to addiction, altered mental status, and depression. I stressed avoiding alcohol, benzodiazepines, muscle relaxants and sleep aids unless specifically okayed by a physician. The patient verbalized understanding of the proper use and possible adverse effects of opioids. All of the patient's questions and concerns were addressed. They were instructed to flush the remaining pills down the toilet if they did not need them for pain.

## 2024-12-05 ENCOUNTER — OFFICE VISIT (OUTPATIENT)
Dept: PAIN MANAGEMENT | Age: 69
End: 2024-12-05
Payer: MEDICARE

## 2024-12-05 ENCOUNTER — TELEPHONE (OUTPATIENT)
Dept: PAIN MANAGEMENT | Age: 69
End: 2024-12-05

## 2024-12-05 VITALS
RESPIRATION RATE: 18 BRPM | HEIGHT: 65 IN | TEMPERATURE: 96.3 F | HEART RATE: 93 BPM | BODY MASS INDEX: 26.33 KG/M2 | OXYGEN SATURATION: 97 % | SYSTOLIC BLOOD PRESSURE: 128 MMHG | DIASTOLIC BLOOD PRESSURE: 70 MMHG | WEIGHT: 158 LBS

## 2024-12-05 DIAGNOSIS — M47.812 CERVICAL SPONDYLOSIS: ICD-10-CM

## 2024-12-05 DIAGNOSIS — M51.9 LUMBAR DISC DISORDER: ICD-10-CM

## 2024-12-05 DIAGNOSIS — G89.4 CHRONIC PAIN SYNDROME: ICD-10-CM

## 2024-12-05 DIAGNOSIS — M16.0 PRIMARY OSTEOARTHRITIS OF BOTH HIPS: Primary | ICD-10-CM

## 2024-12-05 DIAGNOSIS — M41.9 SCOLIOSIS OF THORACIC SPINE, UNSPECIFIED SCOLIOSIS TYPE: ICD-10-CM

## 2024-12-05 DIAGNOSIS — M54.16 LUMBAR RADICULOPATHY: ICD-10-CM

## 2024-12-05 DIAGNOSIS — M47.812 CERVICAL FACET JOINT SYNDROME: ICD-10-CM

## 2024-12-05 DIAGNOSIS — M25.552 PAIN IN LEFT HIP: ICD-10-CM

## 2024-12-05 DIAGNOSIS — M47.814 THORACIC SPONDYLOSIS: ICD-10-CM

## 2024-12-05 DIAGNOSIS — M47.816 LUMBAR SPONDYLOSIS: ICD-10-CM

## 2024-12-05 PROCEDURE — 1160F RVW MEDS BY RX/DR IN RCRD: CPT | Performed by: PAIN MEDICINE

## 2024-12-05 PROCEDURE — 1123F ACP DISCUSS/DSCN MKR DOCD: CPT | Performed by: PAIN MEDICINE

## 2024-12-05 PROCEDURE — 1159F MED LIST DOCD IN RCRD: CPT | Performed by: PAIN MEDICINE

## 2024-12-05 PROCEDURE — 99214 OFFICE O/P EST MOD 30 MIN: CPT | Performed by: PAIN MEDICINE

## 2024-12-05 RX ORDER — SODIUM CHLORIDE 0.9 % (FLUSH) 0.9 %
5-40 SYRINGE (ML) INJECTION EVERY 12 HOURS SCHEDULED
OUTPATIENT
Start: 2024-12-05

## 2024-12-05 RX ORDER — HYDROCODONE BITARTRATE AND ACETAMINOPHEN 5; 325 MG/1; MG/1
1 TABLET ORAL DAILY PRN
Qty: 30 TABLET | Refills: 0 | Status: SHIPPED | OUTPATIENT
Start: 2024-12-12 | End: 2025-01-11

## 2024-12-05 RX ORDER — SODIUM CHLORIDE 9 MG/ML
INJECTION, SOLUTION INTRAVENOUS PRN
OUTPATIENT
Start: 2024-12-05

## 2024-12-05 RX ORDER — SODIUM CHLORIDE 0.9 % (FLUSH) 0.9 %
5-40 SYRINGE (ML) INJECTION PRN
OUTPATIENT
Start: 2024-12-05

## 2024-12-05 NOTE — PROGRESS NOTES
Gabi Orourke presents to the [facility] on 12/5/2024. Gabi is complaining of pain in her lower back and left hip/leg. The pain is constant. The pain is described as stabbing and sharp. Pain is rated on her best day at a 5, on her worst day at a 8, and on average at a 6 on the VAS scale. She took her last dose of Mobic and Norco yesterday.      Any procedures since your last visit: No    She is  on NSAIDS and  is not on anticoagulation medications to include none and is managed by NA.     Pacemaker or defibrillator: No     Medication Contract and Consent for Opioid Use Documents Filed        No documents found                       There were no vitals taken for this visit.     No LMP recorded. Patient has had a hysterectomy.  
depression including death. We discussed that these medications may cause drowsiness, sedation or dizziness and have counseled the patient not to drive or operate machinery. We have discussed that these medications will be prescribed only by one provider. We have discussed with the patient about age related risk factors and have thoroughly discussed the importance of taking these medications as prescribed. The patient verbalizes understanding.    chasity Don M.D.

## 2024-12-05 NOTE — TELEPHONE ENCOUNTER
Call to Gabi Orourke that procedure was scheduled for 12/09/2024 and that [facility] should call her a few days before for the pre op call and after 3:00 PM the business day before with the arrival time. Instructed to call office back if any questions. Gabi verbalized understanding.    Electronically signed by Vicky Guerra RN on 12/5/2024 at 11:22 AM

## 2024-12-09 ENCOUNTER — HOSPITAL ENCOUNTER (OUTPATIENT)
Dept: OPERATING ROOM | Age: 69
Setting detail: OUTPATIENT SURGERY
Discharge: HOME | End: 2024-12-09
Attending: PAIN MEDICINE
Payer: MEDICARE

## 2024-12-09 ENCOUNTER — HOSPITAL ENCOUNTER (OUTPATIENT)
Age: 69
Setting detail: OUTPATIENT SURGERY
Discharge: HOME | End: 2024-12-09
Attending: PAIN MEDICINE | Admitting: PAIN MEDICINE
Payer: MEDICARE

## 2024-12-09 VITALS
TEMPERATURE: 97.7 F | OXYGEN SATURATION: 97 % | RESPIRATION RATE: 15 BRPM | HEIGHT: 65 IN | DIASTOLIC BLOOD PRESSURE: 69 MMHG | HEART RATE: 89 BPM | BODY MASS INDEX: 26.33 KG/M2 | SYSTOLIC BLOOD PRESSURE: 153 MMHG | WEIGHT: 158 LBS

## 2024-12-09 DIAGNOSIS — M25.552 PAIN IN LEFT HIP: ICD-10-CM

## 2024-12-09 PROBLEM — M47.894 THORACIC FACET JOINT SYNDROME: Status: RESOLVED | Noted: 2023-10-17 | Resolved: 2024-12-09

## 2024-12-09 PROCEDURE — 20610 DRAIN/INJ JOINT/BURSA W/O US: CPT | Performed by: PAIN MEDICINE

## 2024-12-09 PROCEDURE — 3600000005 HC SURGERY LEVEL 5 BASE: Performed by: PAIN MEDICINE

## 2024-12-09 PROCEDURE — 7100000010 HC PHASE II RECOVERY - FIRST 15 MIN: Performed by: PAIN MEDICINE

## 2024-12-09 PROCEDURE — 6360000004 HC RX CONTRAST MEDICATION: Performed by: PAIN MEDICINE

## 2024-12-09 PROCEDURE — 7100000011 HC PHASE II RECOVERY - ADDTL 15 MIN: Performed by: PAIN MEDICINE

## 2024-12-09 PROCEDURE — 6360000002 HC RX W HCPCS: Performed by: PAIN MEDICINE

## 2024-12-09 PROCEDURE — 2709999900 HC NON-CHARGEABLE SUPPLY: Performed by: PAIN MEDICINE

## 2024-12-09 PROCEDURE — 77002 NEEDLE LOCALIZATION BY XRAY: CPT

## 2024-12-09 RX ORDER — SODIUM CHLORIDE 9 MG/ML
INJECTION, SOLUTION INTRAVENOUS PRN
Status: DISCONTINUED | OUTPATIENT
Start: 2024-12-09 | End: 2024-12-09 | Stop reason: HOSPADM

## 2024-12-09 RX ORDER — SODIUM CHLORIDE 0.9 % (FLUSH) 0.9 %
5-40 SYRINGE (ML) INJECTION PRN
Status: DISCONTINUED | OUTPATIENT
Start: 2024-12-09 | End: 2024-12-09 | Stop reason: HOSPADM

## 2024-12-09 RX ORDER — LIDOCAINE HYDROCHLORIDE 5 MG/ML
INJECTION, SOLUTION INFILTRATION; INTRAVENOUS PRN
Status: DISCONTINUED | OUTPATIENT
Start: 2024-12-09 | End: 2024-12-09 | Stop reason: ALTCHOICE

## 2024-12-09 RX ORDER — SODIUM CHLORIDE 0.9 % (FLUSH) 0.9 %
5-40 SYRINGE (ML) INJECTION EVERY 12 HOURS SCHEDULED
Status: DISCONTINUED | OUTPATIENT
Start: 2024-12-09 | End: 2024-12-09 | Stop reason: HOSPADM

## 2024-12-09 RX ORDER — ATORVASTATIN CALCIUM 20 MG/1
TABLET, FILM COATED ORAL
Qty: 90 TABLET | Refills: 1 | Status: SHIPPED | OUTPATIENT
Start: 2024-12-09

## 2024-12-09 RX ORDER — LEVOTHYROXINE SODIUM 25 UG/1
TABLET ORAL
Qty: 90 TABLET | Refills: 1 | Status: SHIPPED | OUTPATIENT
Start: 2024-12-09

## 2024-12-09 ASSESSMENT — PAIN - FUNCTIONAL ASSESSMENT
PAIN_FUNCTIONAL_ASSESSMENT: NONE - DENIES PAIN
PAIN_FUNCTIONAL_ASSESSMENT: NONE - DENIES PAIN
PAIN_FUNCTIONAL_ASSESSMENT: 0-10

## 2024-12-09 NOTE — H&P
MD Isaac   blood glucose test strips (ONETOUCH ULTRA) strip CHECK BLOOD SUGAR ONCE DAILY 10/16/23   Davi Horton DO       No Known Allergies    Social History     Socioeconomic History    Marital status: Single     Spouse name: Not on file    Number of children: Not on file    Years of education: Not on file    Highest education level: Not on file   Occupational History    Not on file   Tobacco Use    Smoking status: Former     Current packs/day: 0.00     Average packs/day: 0.5 packs/day for 20.0 years (10.0 ttl pk-yrs)     Types: Cigarettes     Start date: 1997     Quit date: 2017     Years since quittin.2    Smokeless tobacco: Never   Vaping Use    Vaping status: Never Used   Substance and Sexual Activity    Alcohol use: Never    Drug use: Never    Sexual activity: Defer   Other Topics Concern    Not on file   Social History Narrative    Not on file     Social Determinants of Health     Financial Resource Strain: Low Risk  (2024)    Overall Financial Resource Strain (CARDIA)     Difficulty of Paying Living Expenses: Not hard at all   Food Insecurity: No Food Insecurity (2024)    Hunger Vital Sign     Worried About Running Out of Food in the Last Year: Never true     Ran Out of Food in the Last Year: Never true   Transportation Needs: Unknown (2024)    PRAPARE - Transportation     Lack of Transportation (Medical): Not on file     Lack of Transportation (Non-Medical): No   Physical Activity: Inactive (2024)    Exercise Vital Sign     Days of Exercise per Week: 0 days     Minutes of Exercise per Session: 0 min   Stress: Not on file   Social Connections: Not on file   Intimate Partner Violence: Not on file   Housing Stability: Unknown (2024)    Housing Stability Vital Sign     Unable to Pay for Housing in the Last Year: Not on file     Number of Places Lived in the Last Year: Not on file     Unstable Housing in the Last Year: No       Family History   Problem Relation

## 2024-12-09 NOTE — TELEPHONE ENCOUNTER
Name of Medication(s) Requested:  Requested Prescriptions     Pending Prescriptions Disp Refills    levothyroxine (SYNTHROID) 25 MCG tablet [Pharmacy Med Name: LEVOTHYROXINE 25 MCG TABLET] 90 tablet 1     Sig: TAKE 1 TABLET BY MOUTH EVERY DAY    atorvastatin (LIPITOR) 20 MG tablet [Pharmacy Med Name: ATORVASTATIN 20 MG TABLET] 90 tablet 1     Sig: TAKE 1 TABLET BY MOUTH EVERY DAY       Medication is on current medication list Yes    Dosage and directions were verified? Yes    Quantity verified: 90 day supply     Pharmacy Verified?  Yes    Last Appointment:  8/6/2024    Future appts:  Future Appointments   Date Time Provider Department Center   12/9/2024  3:40 PM [facility] 1 [facility] [place]   1/16/2025 11:45 AM Neri Orantes MD [facility]   2/11/2025 10:30 AM Davi Horton DO [facility]        (If no appt send self scheduling link. .REFILLAPPT)  Scheduling request sent?     [] Yes  [x] No    Does patient need updated?  [] Yes  [x] No

## 2024-12-09 NOTE — OP NOTE
2024    Patient: Gabi Orourke  :  1955  Age:  69 y.o.  Sex:  female     PRE-OPERATIVE DIAGNOSIS: Left Hip osteoarthritis, hip pain.    POST-OPERATIVE DIAGNOSIS: Same.    PROCEDURE PERFORMED: Left Hip steroid injection under fluoroscopic guidance.    SURGEON:   Arian DORSEY    ANESTHESIA: Local    ESTIMATED BLOOD LOSS: None.    BRIEF HISTORY: Gabi Orourke comes in today for Left hip steroid injection under fluoroscopic guidance. After discussing the potential risks and benefits of the procedure with the patient  Gabi did request that we proceed. A complete History & Physical was reviewed and it is unchanged.    DESCRIPTION OF PROCEDURE:     After confirming written and informed consent, a time-out was performed and Gabi’s name and date of birth, the procedure to be performed as well as the plan for the location of the needle insertion were confirmed.    Patient was brought into the procedure room and was placed in the lateral decubitus position on the fluoroscopy table. Standard monitors were placed and vital signs were observed throughout the procedure  The area of the Left hip was prepped with chloraprep and draped in a sterile manner. The overlying skin and subcutaneous tissues were anesthetized with 0.5% lidocaine.  At this time, a 22 gauge spinal 3 1/2 inch spinal needle was advanced in lateral view until bone was contacted. At this point AP fluoroscopic view was used and the needle was slightly advanced into the hip joint. 0.5 cc of 240 omnipaque was injected with appropiate contrast spread under live fluoroscopy. A solution of 0.25 % marcaine 4 cc and 40 mg DepoMedrol was injected easily after negative aspiration without complications. The needle was then removed and Band-Aid applied.    Disposition the patient tolerated the procedure well and there were no complications . Vital signs remained stable throughout the procedure. The patient was escorted to the recovery area

## 2024-12-09 NOTE — DISCHARGE INSTRUCTIONS
[facility] Pain Management Department  [phone]   Post-Pain Block/ Radiofrequency Home Going Instructions    1-Go home, rest for the remainder of the day  2-Please do not lift over 20 pounds the day of the injection  3-If you received sedation No: alcohol, driving, operating lawn mowers, plows, tractors or other dangerous equipment until next morning. Do not make important decisions or sign legal documents for 24 hours. You may experience light headedness, dizziness, nausea or sleepiness after sedation. Do not stay alone. A responsible adult must be with you for 24 hours. You could be nauseated from the medications you have received. Your IV site may be sore and bruised.    4-No dietary restrictions     5-Resume all medications the same day, blood thinners to be resumed 24 hours after injection    6-Keep the surgical site clean and dry, you may shower the next morning and remove the      dressing.     7- No sitz baths, tub baths or hot tubs/swimming for 24 hours.       8- If you have any pain at the injection site(s), application of an ice pack to the area should be       helpful, 20 minutes on/20 minutes off for next 48 hours.  9- Call [facility] pain management immediately at if you develop.  Fever greater than 100.4 F  Have bleeding or drainage from the puncture site  Have progressive Leg/arm numbness and or weakness  Loss of control of bowel and or bladder (wet/soil yourself)  Severe headache with inability to lift head  10-You may return to work the next day           Infection After Surgery: Care Instructions  Overview  After surgery, an infection is always possible. It doesn't mean that the surgery didn't go well.  Because an infection can be serious, your doctor has taken steps to manage it.  Your doctor checked the infection and cleaned it if necessary. Your doctor may have made an opening in the area so that the pus can drain out. You may have gauze in the cut so that the area will stay open and keep

## 2024-12-11 NOTE — TELEPHONE ENCOUNTER
Requested Prescriptions     Pending Prescriptions Disp Refills    metFORMIN (GLUCOPHAGE) 500 MG tablet [Pharmacy Med Name: METFORMIN  MG TABLET] 360 tablet 3     Sig: TAKE 2 TABLETS BY MOUTH TWICE A DAY       Next appt is 2/11/2025  Last appt was 8/6/2024

## 2025-01-16 ENCOUNTER — OFFICE VISIT (OUTPATIENT)
Dept: PAIN MANAGEMENT | Age: 70
End: 2025-01-16
Payer: MEDICARE

## 2025-01-16 VITALS
RESPIRATION RATE: 18 BRPM | WEIGHT: 158 LBS | SYSTOLIC BLOOD PRESSURE: 122 MMHG | BODY MASS INDEX: 26.33 KG/M2 | TEMPERATURE: 96.4 F | OXYGEN SATURATION: 96 % | HEIGHT: 65 IN | DIASTOLIC BLOOD PRESSURE: 79 MMHG | HEART RATE: 89 BPM

## 2025-01-16 DIAGNOSIS — M54.16 LUMBAR RADICULOPATHY: Primary | ICD-10-CM

## 2025-01-16 DIAGNOSIS — M51.9 LUMBAR DISC DISORDER: ICD-10-CM

## 2025-01-16 DIAGNOSIS — M16.0 PRIMARY OSTEOARTHRITIS OF BOTH HIPS: ICD-10-CM

## 2025-01-16 DIAGNOSIS — M47.814 THORACIC SPONDYLOSIS: ICD-10-CM

## 2025-01-16 DIAGNOSIS — M41.9 SCOLIOSIS OF THORACIC SPINE, UNSPECIFIED SCOLIOSIS TYPE: ICD-10-CM

## 2025-01-16 DIAGNOSIS — M47.816 LUMBAR SPONDYLOSIS: ICD-10-CM

## 2025-01-16 DIAGNOSIS — M47.812 CERVICAL SPONDYLOSIS: ICD-10-CM

## 2025-01-16 PROCEDURE — 99214 OFFICE O/P EST MOD 30 MIN: CPT | Performed by: PAIN MEDICINE

## 2025-01-16 PROCEDURE — 1123F ACP DISCUSS/DSCN MKR DOCD: CPT | Performed by: PAIN MEDICINE

## 2025-01-16 PROCEDURE — 1159F MED LIST DOCD IN RCRD: CPT | Performed by: PAIN MEDICINE

## 2025-01-16 PROCEDURE — 1160F RVW MEDS BY RX/DR IN RCRD: CPT | Performed by: PAIN MEDICINE

## 2025-01-16 RX ORDER — HYDROCODONE BITARTRATE AND ACETAMINOPHEN 5; 325 MG/1; MG/1
1 TABLET ORAL DAILY PRN
Qty: 30 TABLET | Refills: 0 | Status: SHIPPED | OUTPATIENT
Start: 2025-01-16 | End: 2025-02-15

## 2025-01-16 RX ORDER — SODIUM CHLORIDE 0.9 % (FLUSH) 0.9 %
5-40 SYRINGE (ML) INJECTION PRN
OUTPATIENT
Start: 2025-01-16

## 2025-01-16 RX ORDER — SODIUM CHLORIDE 9 MG/ML
INJECTION, SOLUTION INTRAVENOUS PRN
OUTPATIENT
Start: 2025-01-16

## 2025-01-16 RX ORDER — SODIUM CHLORIDE 0.9 % (FLUSH) 0.9 %
5-40 SYRINGE (ML) INJECTION EVERY 12 HOURS SCHEDULED
OUTPATIENT
Start: 2025-01-16

## 2025-01-16 NOTE — PROGRESS NOTES
[facility]  [address], [place] [address]  [phone]    Follow up Note      Gabi Orourke     Date of Visit:  1/16/2025    CC:  Patient presents for follow up   Chief Complaint   Patient presents with    Follow-up     Left hip injection under Xray       HPI:  Worsening low back with radiation to the Left lower extremity.  Pain is described as shooting/intermittent and is aggravated by walking  Appropriate analgesia with current medications regimen:yes    Change in quality of symptoms:no.    Medication side effects:none  Recent diagnostic testing:none  Recent interventional procedures:Left hip injection with good relief in her hip pain    She has not been on anticoagulation medications to include none. The patient  has not been on herbal supplements.  The patient is diabetic.     Imaging:   Lumbar spine MRI 2024  1. Degenerative change.  Slight grade 1 anterolisthesis at L4-5 and slight  grade 1 retrolisthesis at L5-S1.  Mild levoscoliosis.  2. Mild central canal stenosis at L4-5.  3. Multilevel neural foraminal stenosis, most prominent (moderate to severe)  on the left at L5-S1.  4. Small left-sided disc protrusion at L1-2 causing moderate left neural  foraminal stenosis and slight impression on the left subarticular recess.  Lumbar spine MRI 2020:  L1/L2   There is normal alignment and vertebral body height. The disc space   is normal. There is no evidence of canal or foraminal narrowing.   There is no evidence of bulging or herniated disc.   L2/L3   There is normal alignment and vertebral body height. The disc space   is normal. There is no evidence of canal or foraminal narrowing.   There is no evidence of bulging or herniated disc.   L3/L4   Asymmetrical bulging disc to the left with circumferentially oriented   annular tear. No canal or foraminal narrowing.   L4/L5   Trace spondylolisthesis and facet hypertrophy without canal stenosis.   Mild bilateral

## 2025-01-16 NOTE — PROGRESS NOTES
Gabi Orourke presents to the [facility] on 1/16/2025. Gabi is complaining of pain in her lower back and left hip/.leg. The pain is constant. The pain is described as stabbing and sharp. Pain is rated on her best day at a 6, on her worst day at a 8, and on average at a 6 on the VAS scale. She took her last dose of Mobic and Norco Yesterday.      Any procedures since your last visit: Yes, with 0 % relief.    She is  on NSAIDS and  is not on anticoagulation medications to include none and is managed by NA.     Pacemaker or defibrillator: No     Medication Contract and Consent for Opioid Use Documents Filed        No documents found                       Resp 18   Ht 1.651 m (5' 5\")   Wt 71.7 kg (158 lb)   BMI 26.29 kg/m²      No LMP recorded. Patient has had a hysterectomy.

## 2025-01-21 RX ORDER — SEMAGLUTIDE 0.68 MG/ML
INJECTION, SOLUTION SUBCUTANEOUS
Qty: 3 ML | Refills: 5 | Status: SHIPPED | OUTPATIENT
Start: 2025-01-21

## 2025-01-21 NOTE — TELEPHONE ENCOUNTER
Requested Prescriptions     Pending Prescriptions Disp Refills    OZEMPIC, 0.25 OR 0.5 MG/DOSE, 2 MG/3ML SOPN [Pharmacy Med Name: OZEMPIC 0.25-0.5 MG/DOSE PEN]  5     Sig: INJECT 0.5 MG INTO THE SKIN ONE TIME PER WEEK       Next appt is 2/11/2025  Last appt was 8/6/2024

## 2025-01-24 ENCOUNTER — TELEPHONE (OUTPATIENT)
Dept: PAIN MANAGEMENT | Age: 70
End: 2025-01-24

## 2025-01-24 NOTE — TELEPHONE ENCOUNTER
Call to Gabi Orourke that procedure was scheduled for 02/03/2025 and that [facility] should call her a few days before for the pre op call and after 3:00 PM the business day before with the arrival time. Instructed Gabi to hold ibuprofen for 24 hours, Celebrex, Mobic, and naprosyn for 4 days and any aspirin containing products, CoQ 10, or fish oil for 7 days. Instructed to call office back if any questions. Gabi verbalized understanding.    Electronically signed by Vicky Guerra RN on 1/24/2025 at 5:32 PM

## 2025-02-03 ENCOUNTER — HOSPITAL ENCOUNTER (OUTPATIENT)
Age: 70
Setting detail: OUTPATIENT SURGERY
Discharge: HOME | End: 2025-02-03
Attending: PAIN MEDICINE | Admitting: PAIN MEDICINE
Payer: MEDICARE

## 2025-02-03 ENCOUNTER — HOSPITAL ENCOUNTER (OUTPATIENT)
Dept: OPERATING ROOM | Age: 70
Setting detail: OUTPATIENT SURGERY
Discharge: HOME | End: 2025-02-03
Attending: PAIN MEDICINE
Payer: MEDICARE

## 2025-02-03 VITALS
RESPIRATION RATE: 17 BRPM | HEART RATE: 87 BPM | BODY MASS INDEX: 26.33 KG/M2 | DIASTOLIC BLOOD PRESSURE: 64 MMHG | TEMPERATURE: 97.8 F | SYSTOLIC BLOOD PRESSURE: 119 MMHG | WEIGHT: 158 LBS | HEIGHT: 65 IN | OXYGEN SATURATION: 96 %

## 2025-02-03 DIAGNOSIS — M51.9 LUMBAR DISC DISEASE: ICD-10-CM

## 2025-02-03 PROCEDURE — 64483 NJX AA&/STRD TFRM EPI L/S 1: CPT | Performed by: PAIN MEDICINE

## 2025-02-03 PROCEDURE — 3600000015 HC SURGERY LEVEL 5 ADDTL 15MIN: Performed by: PAIN MEDICINE

## 2025-02-03 PROCEDURE — 7100000010 HC PHASE II RECOVERY - FIRST 15 MIN: Performed by: PAIN MEDICINE

## 2025-02-03 PROCEDURE — 64484 NJX AA&/STRD TFRM EPI L/S EA: CPT | Performed by: PAIN MEDICINE

## 2025-02-03 PROCEDURE — 7100000011 HC PHASE II RECOVERY - ADDTL 15 MIN: Performed by: PAIN MEDICINE

## 2025-02-03 PROCEDURE — 3600000005 HC SURGERY LEVEL 5 BASE: Performed by: PAIN MEDICINE

## 2025-02-03 PROCEDURE — 6360000002 HC RX W HCPCS: Performed by: PAIN MEDICINE

## 2025-02-03 PROCEDURE — 2709999900 HC NON-CHARGEABLE SUPPLY: Performed by: PAIN MEDICINE

## 2025-02-03 PROCEDURE — 6360000004 HC RX CONTRAST MEDICATION: Performed by: PAIN MEDICINE

## 2025-02-03 RX ORDER — SODIUM CHLORIDE 0.9 % (FLUSH) 0.9 %
5-40 SYRINGE (ML) INJECTION EVERY 12 HOURS SCHEDULED
Status: DISCONTINUED | OUTPATIENT
Start: 2025-02-03 | End: 2025-02-03 | Stop reason: HOSPADM

## 2025-02-03 RX ORDER — SODIUM CHLORIDE 0.9 % (FLUSH) 0.9 %
5-40 SYRINGE (ML) INJECTION PRN
Status: DISCONTINUED | OUTPATIENT
Start: 2025-02-03 | End: 2025-02-03 | Stop reason: HOSPADM

## 2025-02-03 RX ORDER — LIDOCAINE HYDROCHLORIDE 5 MG/ML
INJECTION, SOLUTION INFILTRATION; INTRAVENOUS PRN
Status: DISCONTINUED | OUTPATIENT
Start: 2025-02-03 | End: 2025-02-03 | Stop reason: ALTCHOICE

## 2025-02-03 RX ORDER — SODIUM CHLORIDE 9 MG/ML
INJECTION, SOLUTION INTRAVENOUS PRN
Status: DISCONTINUED | OUTPATIENT
Start: 2025-02-03 | End: 2025-02-03 | Stop reason: HOSPADM

## 2025-02-03 ASSESSMENT — PAIN DESCRIPTION - DESCRIPTORS: DESCRIPTORS: ACHING

## 2025-02-03 NOTE — H&P
[facility]  [address], [place] [address]  [phone]    Procedure History & Physical      Gabi CONTRERAS Shantellejenellemarkell     HPI:    Patient  is here for low back and left lower extremity pain for Left L4 and L5 TFESI.     Labs/imaging studies reviewed   All question and concerns addressed including R/B/A associated with the procedure    Past Medical History:   Diagnosis Date    Acquired hypothyroidism 06/30/2022    Anxiety     Anxiety and depression 02/08/2022    Breast cancer (HCC)     right- 2021    COVID 08/2022    Degenerative joint disease of both hips     Depression     Diverticulosis     Fibrocystic breast     History of cardiovascular stress test 07/18/2013    stress echo done    Hypertension     Invasive ductal carcinoma of breast (HCC)     Reactive airway disease     Scoliosis     Scoliosis     Type 2 diabetes mellitus without complication (HCC)     PO meds only    Type 2 diabetes mellitus without complication, without long-term current use of insulin (HCC) 02/08/2022    Urolithiasis     Vitamin D deficiency     Zoster        Past Surgical History:   Procedure Laterality Date    BACK SURGERY  1973    scoliosis, cai bars    BREAST LUMPECTOMY Right 2020    COLONOSCOPY N/A 03/01/2022    Dr. Barrios.  Follow-up 5 years.    DILATION AND CURETTAGE OF UTERUS  07/1993    ECHOCARDIOGRAM EXERCISE STRESS TEST  07/18/2013         HIP SURGERY Left 09/23/2024    Left hip injection under Xray performed by Neri Orantes MD at [facility] OR    HIP SURGERY Left 12/09/2024    Left hip injection under Xray performed by Neri Orantes MD at [facility] OR    HYSTERECTOMY (CERVIX STATUS UNKNOWN) Right     [facility] US GUIDED PLACE LOC DEVICE PERC 1ST LESION  08/31/2020    [facility] US PERQ DEVICE SOFT TISSUE PLMT  FIRST LESION 8/31/2020 [facility]    PAIN MANAGEMENT PROCEDURE Bilateral 11/02/2023    Bilateral L3,4,5 medial branch block. performed by Neri Orantes MD at [facility]

## 2025-02-03 NOTE — DISCHARGE INSTRUCTIONS
[facility] Pain Management Department  [phone]   Post-Pain Block/ Radiofrequency Home Going Instructions    1-Go home, rest for the remainder of the day  2-Please do not lift over 20 pounds the day of the injection  3-If you received sedation No: alcohol, driving, operating lawn mowers, plows, tractors or other dangerous equipment until next morning. Do not make important decisions or sign legal documents for 24 hours. You may experience light headedness, dizziness, nausea or sleepiness after sedation. Do not stay alone. A responsible adult must be with you for 24 hours. You could be nauseated from the medications you have received. Your IV site may be sore and bruised.    4-No dietary restrictions     5-Resume all medications the same day, blood thinners to be resumed 24 hours after injection    6-Keep the surgical site clean and dry, you may shower the next morning and remove the      dressing.     7- No sitz baths, tub baths or hot tubs/swimming for 24 hours.       8- If you have any pain at the injection site(s), application of an ice pack to the area should be       helpful, 20 minutes on/20 minutes off for next 48 hours.  9- Call [facility] pain management immediately at if you develop.  Fever greater than 100.4 F  Have bleeding or drainage from the puncture site  Have progressive Leg/arm numbness and or weakness  Loss of control of bowel and or bladder (wet/soil yourself)  Severe headache with inability to lift head  10-You may return to work the next day

## 2025-02-03 NOTE — OP NOTE
2/3/2025    Patient: Gabi Orourke  :  1955  Age:  69 y.o.  Sex:  female     PRE-OPERATIVE DIAGNOSIS: Lumbar disc displacement, lumbar neural foraminal stenosis, lumbar radiculopathy.     POST-OPERATIVE DIAGNOSIS: Same.    PROCEDURE: Left Transforaminal epidural steroid injection under fluoroscopic guidance at foraminal level L4 and L5 (#1).    SURGEON: Arian DORSEY    ANESTHESIA: Local    ESTIMATED BLOOD LOSS: None.  ______________________________________________________________________  BRIEF HISTORY: Gabi Orourke comes in today for the first Left transforaminal epidural steroid injection under fluoroscopic guidance at foraminal level L4 and L5 . The potential complications of this procedure were discussed with her again today.  She has elected to undergo the aforementioned procedure.     Gabi’s complete History & Physical examination were reviewed in depth, a copy of which is in the chart.      DESCRIPTION OF PROCEDURE:    After confirming written and informed consent, a time-out was performed and Gabi’s name and date of birth, the procedure to be performed as well as the plan for the location of the needle insertion were confirmed.    The patient was brought into the procedure room and placed in the prone position on the fluoroscopy table. Standard monitors were placed and vital signs were observed throughout the procedure. The area of the lumbar spine was prepped with chloraprep and draped in a sterile manner. The vertebral body was identified with AP fluoroscopy. An oblique view was obtained to better visualize the inferior junction of the pedicle and transverse process . The 6 o'clock position of the pedicle was marked and identified. The skin and subcutaneous tissue were anesthetized with 0.5% lidocaine. A # 22 gauge pencil point needle was directed toward the targeted point under fluoroscopy until bone was contacted. The needle was then walked inferiorly until the neural

## 2025-02-11 ENCOUNTER — OFFICE VISIT (OUTPATIENT)
Dept: PRIMARY CARE CLINIC | Age: 70
End: 2025-02-11
Payer: MEDICARE

## 2025-02-11 VITALS
TEMPERATURE: 98.2 F | BODY MASS INDEX: 25.99 KG/M2 | WEIGHT: 156 LBS | HEIGHT: 65 IN | DIASTOLIC BLOOD PRESSURE: 78 MMHG | SYSTOLIC BLOOD PRESSURE: 132 MMHG

## 2025-02-11 DIAGNOSIS — M47.816 LUMBAR SPONDYLOSIS: ICD-10-CM

## 2025-02-11 DIAGNOSIS — C50.911 CARCINOMA OF RIGHT BREAST METASTATIC TO AXILLARY LYMPH NODE (HCC): ICD-10-CM

## 2025-02-11 DIAGNOSIS — E03.9 ACQUIRED HYPOTHYROIDISM: ICD-10-CM

## 2025-02-11 DIAGNOSIS — E11.9 TYPE 2 DIABETES MELLITUS WITHOUT COMPLICATION, WITHOUT LONG-TERM CURRENT USE OF INSULIN (HCC): ICD-10-CM

## 2025-02-11 DIAGNOSIS — E55.9 VITAMIN D DEFICIENCY: ICD-10-CM

## 2025-02-11 DIAGNOSIS — Z00.00 MEDICARE ANNUAL WELLNESS VISIT, SUBSEQUENT: Primary | ICD-10-CM

## 2025-02-11 DIAGNOSIS — C77.3 CARCINOMA OF RIGHT BREAST METASTATIC TO AXILLARY LYMPH NODE (HCC): ICD-10-CM

## 2025-02-11 DIAGNOSIS — M41.9 SCOLIOSIS OF THORACIC SPINE, UNSPECIFIED SCOLIOSIS TYPE: ICD-10-CM

## 2025-02-11 LAB
HBA1C MFR BLD: 6.1 %
HCT VFR BLD CALC: 44.5 % (ref 34–48)
HEMOGLOBIN: 14.6 G/DL (ref 11.5–15.5)
MCH RBC QN AUTO: 31.8 PG (ref 26–35)
MCHC RBC AUTO-ENTMCNC: 32.8 G/DL (ref 32–34.5)
MCV RBC AUTO: 96.9 FL (ref 80–99.9)
PDW BLD-RTO: 13.4 % (ref 11.5–15)
PLATELET # BLD: 303 K/UL (ref 130–450)
PMV BLD AUTO: 9.3 FL (ref 7–12)
RBC # BLD: 4.59 M/UL (ref 3.5–5.5)
WBC # BLD: 7.3 K/UL (ref 4.5–11.5)

## 2025-02-11 PROCEDURE — G0439 PPPS, SUBSEQ VISIT: HCPCS | Performed by: FAMILY MEDICINE

## 2025-02-11 PROCEDURE — 36415 COLL VENOUS BLD VENIPUNCTURE: CPT | Performed by: FAMILY MEDICINE

## 2025-02-11 PROCEDURE — 3044F HG A1C LEVEL LT 7.0%: CPT | Performed by: FAMILY MEDICINE

## 2025-02-11 PROCEDURE — 1123F ACP DISCUSS/DSCN MKR DOCD: CPT | Performed by: FAMILY MEDICINE

## 2025-02-11 PROCEDURE — 83036 HEMOGLOBIN GLYCOSYLATED A1C: CPT | Performed by: FAMILY MEDICINE

## 2025-02-11 RX ORDER — SEMAGLUTIDE 0.68 MG/ML
1 INJECTION, SOLUTION SUBCUTANEOUS WEEKLY
Qty: 3 ML | Refills: 5 | Status: SHIPPED
Start: 2025-02-11 | End: 2025-02-12

## 2025-02-11 SDOH — ECONOMIC STABILITY: FOOD INSECURITY: WITHIN THE PAST 12 MONTHS, THE FOOD YOU BOUGHT JUST DIDN'T LAST AND YOU DIDN'T HAVE MONEY TO GET MORE.: NEVER TRUE

## 2025-02-11 SDOH — ECONOMIC STABILITY: FOOD INSECURITY: WITHIN THE PAST 12 MONTHS, YOU WORRIED THAT YOUR FOOD WOULD RUN OUT BEFORE YOU GOT MONEY TO BUY MORE.: NEVER TRUE

## 2025-02-11 ASSESSMENT — PATIENT HEALTH QUESTIONNAIRE - PHQ9
4. FEELING TIRED OR HAVING LITTLE ENERGY: NOT AT ALL
SUM OF ALL RESPONSES TO PHQ QUESTIONS 1-9: 0
SUM OF ALL RESPONSES TO PHQ9 QUESTIONS 1 & 2: 0
3. TROUBLE FALLING OR STAYING ASLEEP: NOT AT ALL
9. THOUGHTS THAT YOU WOULD BE BETTER OFF DEAD, OR OF HURTING YOURSELF: NOT AT ALL
SUM OF ALL RESPONSES TO PHQ QUESTIONS 1-9: 0
6. FEELING BAD ABOUT YOURSELF - OR THAT YOU ARE A FAILURE OR HAVE LET YOURSELF OR YOUR FAMILY DOWN: NOT AT ALL
2. FEELING DOWN, DEPRESSED OR HOPELESS: NOT AT ALL
5. POOR APPETITE OR OVEREATING: NOT AT ALL
10. IF YOU CHECKED OFF ANY PROBLEMS, HOW DIFFICULT HAVE THESE PROBLEMS MADE IT FOR YOU TO DO YOUR WORK, TAKE CARE OF THINGS AT HOME, OR GET ALONG WITH OTHER PEOPLE: NOT DIFFICULT AT ALL
SUM OF ALL RESPONSES TO PHQ QUESTIONS 1-9: 0
8. MOVING OR SPEAKING SO SLOWLY THAT OTHER PEOPLE COULD HAVE NOTICED. OR THE OPPOSITE, BEING SO FIGETY OR RESTLESS THAT YOU HAVE BEEN MOVING AROUND A LOT MORE THAN USUAL: NOT AT ALL
1. LITTLE INTEREST OR PLEASURE IN DOING THINGS: NOT AT ALL
7. TROUBLE CONCENTRATING ON THINGS, SUCH AS READING THE NEWSPAPER OR WATCHING TELEVISION: NOT AT ALL
SUM OF ALL RESPONSES TO PHQ QUESTIONS 1-9: 0

## 2025-02-11 ASSESSMENT — LIFESTYLE VARIABLES
HOW MANY STANDARD DRINKS CONTAINING ALCOHOL DO YOU HAVE ON A TYPICAL DAY: PATIENT DOES NOT DRINK
HOW OFTEN DO YOU HAVE A DRINK CONTAINING ALCOHOL: NEVER

## 2025-02-11 NOTE — PROGRESS NOTES
Medicare Annual Wellness Visit    Gabi Orourke is here for Medicare AWV (Subsequent Medicare AWE Labs A1c 6.1)    Assessment & Plan   Medicare annual wellness visit, subsequent  -     Comprehensive Metabolic Panel, Fasting  -     Lipid Panel  -     CBC  Type 2 diabetes mellitus without complication, without long-term current use of insulin (HCC)  -     POCT glycosylated hemoglobin (Hb A1C)  -     Comprehensive Metabolic Panel, Fasting  -     Lipid Panel  -     Vitamin D 25 Hydroxy  -     TSH  -     CBC  -     Semaglutide, 1 MG/DOSE, 2 MG/1.5ML SOPN; Inject 1 mg into the skin once a week, Disp-2 Adjustable Dose Pre-filled Pen Syringe, R-5Normal  Carcinoma of right breast metastatic to axillary lymph node (HCC)  -     Comprehensive Metabolic Panel, Fasting  Acquired hypothyroidism  -     TSH  Vitamin D deficiency  -     Vitamin D 25 Hydroxy  Lumbar spondylosis  Scoliosis of thoracic spine, unspecified scoliosis type       Return in 6 months (on 8/11/2025) for Medicare Annual Wellness Visit in 1 year, Labs, Follow up.     Subjective       Patient's complete Health Risk Assessment and screening values have been reviewed and are found in Flowsheets. The following problems were reviewed today and where indicated follow up appointments were made and/or referrals ordered.    Positive Risk Factor Screenings with Interventions:          Controlled Medication Review:    Today's Pain Level: No data recorded   Opioid Risk: (Low risk score <55) Opioid risk score: 9    Patient is low risk for opioid use disorder or overdose.    Last PDMP Dario as Reviewed:  Review User Review Instant Review Result   JARED MCCLAIN 2/13/2025  3:50 PM     Reviewed PDMP [1]         General HRA Questions:  Select all that apply: (!) New or Increased Fatigue  Interventions Fatigue:  Consider low impact cardiovascular exercise program minimum moderate and 50 minutes weekly.      Inactivity:  On average, how many days per week do you engage in

## 2025-02-12 LAB
ALBUMIN: 4 G/DL (ref 3.5–5.2)
ALP BLD-CCNC: 90 U/L (ref 35–104)
ALT SERPL-CCNC: 15 U/L (ref 0–32)
ANION GAP SERPL CALCULATED.3IONS-SCNC: 9 MMOL/L (ref 7–16)
AST SERPL-CCNC: 14 U/L (ref 0–31)
BILIRUB SERPL-MCNC: 0.3 MG/DL (ref 0–1.2)
BUN BLDV-MCNC: 11 MG/DL (ref 6–23)
CALCIUM SERPL-MCNC: 9.3 MG/DL (ref 8.6–10.2)
CHLORIDE BLD-SCNC: 100 MMOL/L (ref 98–107)
CHOLESTEROL, TOTAL: 108 MG/DL
CO2: 28 MMOL/L (ref 22–29)
CREAT SERPL-MCNC: 0.6 MG/DL (ref 0.5–1)
GFR, ESTIMATED: >90 ML/MIN/1.73M2
GLUCOSE FASTING: 129 MG/DL (ref 74–99)
HDLC SERPL-MCNC: 46 MG/DL
LDL CHOLESTEROL: 51 MG/DL
POTASSIUM SERPL-SCNC: 4.6 MMOL/L (ref 3.5–5)
SODIUM BLD-SCNC: 137 MMOL/L (ref 132–146)
TOTAL PROTEIN: 6.9 G/DL (ref 6.4–8.3)
TRIGL SERPL-MCNC: 57 MG/DL
TSH SERPL DL<=0.05 MIU/L-ACNC: 3.35 UIU/ML (ref 0.27–4.2)
VITAMIN D 25-HYDROXY: 89.3 NG/ML (ref 30–100)
VLDLC SERPL CALC-MCNC: 11 MG/DL

## 2025-02-12 RX ORDER — TIRZEPATIDE 2.5 MG/.5ML
INJECTION, SOLUTION SUBCUTANEOUS
Refills: 0 | OUTPATIENT
Start: 2025-02-12

## 2025-02-13 ENCOUNTER — OFFICE VISIT (OUTPATIENT)
Dept: PAIN MANAGEMENT | Age: 70
End: 2025-02-13
Payer: MEDICARE

## 2025-02-13 VITALS
RESPIRATION RATE: 18 BRPM | TEMPERATURE: 96.6 F | WEIGHT: 156 LBS | HEIGHT: 65 IN | SYSTOLIC BLOOD PRESSURE: 136 MMHG | DIASTOLIC BLOOD PRESSURE: 62 MMHG | BODY MASS INDEX: 25.99 KG/M2 | OXYGEN SATURATION: 95 % | HEART RATE: 89 BPM

## 2025-02-13 DIAGNOSIS — M47.812 CERVICAL FACET JOINT SYNDROME: ICD-10-CM

## 2025-02-13 DIAGNOSIS — M47.814 THORACIC SPONDYLOSIS: ICD-10-CM

## 2025-02-13 DIAGNOSIS — M50.90 CERVICAL DISC DISORDER: ICD-10-CM

## 2025-02-13 DIAGNOSIS — M16.0 PRIMARY OSTEOARTHRITIS OF BOTH HIPS: ICD-10-CM

## 2025-02-13 DIAGNOSIS — M25.552 PAIN IN LEFT HIP: ICD-10-CM

## 2025-02-13 DIAGNOSIS — G89.4 CHRONIC PAIN SYNDROME: ICD-10-CM

## 2025-02-13 DIAGNOSIS — M47.816 LUMBAR SPONDYLOSIS: ICD-10-CM

## 2025-02-13 DIAGNOSIS — M47.812 CERVICAL SPONDYLOSIS: ICD-10-CM

## 2025-02-13 DIAGNOSIS — M51.9 LUMBAR DISC DISORDER: ICD-10-CM

## 2025-02-13 DIAGNOSIS — M41.9 SCOLIOSIS OF THORACIC SPINE, UNSPECIFIED SCOLIOSIS TYPE: Primary | ICD-10-CM

## 2025-02-13 DIAGNOSIS — M54.16 LUMBAR RADICULOPATHY: ICD-10-CM

## 2025-02-13 PROCEDURE — 99214 OFFICE O/P EST MOD 30 MIN: CPT | Performed by: PAIN MEDICINE

## 2025-02-13 PROCEDURE — 1123F ACP DISCUSS/DSCN MKR DOCD: CPT | Performed by: PAIN MEDICINE

## 2025-02-13 PROCEDURE — 1159F MED LIST DOCD IN RCRD: CPT | Performed by: PAIN MEDICINE

## 2025-02-13 PROCEDURE — 1160F RVW MEDS BY RX/DR IN RCRD: CPT | Performed by: PAIN MEDICINE

## 2025-02-13 RX ORDER — HYDROCODONE BITARTRATE AND ACETAMINOPHEN 5; 325 MG/1; MG/1
1 TABLET ORAL DAILY PRN
Qty: 30 TABLET | Refills: 0 | Status: SHIPPED | OUTPATIENT
Start: 2025-02-13 | End: 2025-03-15

## 2025-02-13 RX ORDER — MELOXICAM 15 MG/1
15 TABLET ORAL DAILY PRN
Qty: 30 TABLET | Refills: 2 | Status: SHIPPED | OUTPATIENT
Start: 2025-02-13 | End: 2025-05-14

## 2025-02-13 NOTE — PROGRESS NOTES
Gabi Orourke presents to the [facility] on 2/13/2025. Gabi is complaining of pain in her lower back and left hip/leg. The pain is constant. The pain is described as sharp. Pain is rated on her best day at a 4, on her worst day at a 8, and on average at a 4 on the VAS scale. She took her last dose of Mobic and Norco today.    Any procedures since your last visit: Yes, with 60-70 % relief.    She is  on NSAIDS and  is not on anticoagulation medications to include none and is managed by NA.     Pacemaker or defibrillator: No     Medication Contract and Consent for Opioid Use Documents Filed        No documents found                       Resp 18   Ht 1.651 m (5' 5\")   Wt 70.8 kg (156 lb)   BMI 25.96 kg/m²      No LMP recorded. Patient has had a hysterectomy.

## 2025-02-13 NOTE — PROGRESS NOTES
[facility]  [address], [place] [address]  [phone]    Follow up Note      Gabi Orourke     Date of Visit:  2/13/2025    CC:  Patient presents for follow up   Chief Complaint   Patient presents with    Follow-up     Left Lumbar 4 and Lumbar 5 transforaminal epidural steroid injection       HPI:  Follow up on her low back with radiation to the Left lower extremity with no acute issues.  Appropriate analgesia with current medications regimen:yes    Change in quality of symptoms:no.    Medication side effects:none  Recent diagnostic testing:none  Recent interventional procedures:Left L4 and L5 TFESI with more than 80% improvement in her pain    She has not been on anticoagulation medications to include none. The patient  has not been on herbal supplements.  The patient is diabetic.     Imaging:   Lumbar spine MRI 2024  1. Degenerative change.  Slight grade 1 anterolisthesis at L4-5 and slight  grade 1 retrolisthesis at L5-S1.  Mild levoscoliosis.  2. Mild central canal stenosis at L4-5.  3. Multilevel neural foraminal stenosis, most prominent (moderate to severe)  on the left at L5-S1.  4. Small left-sided disc protrusion at L1-2 causing moderate left neural  foraminal stenosis and slight impression on the left subarticular recess.  Lumbar spine MRI 2020:  L1/L2   There is normal alignment and vertebral body height. The disc space   is normal. There is no evidence of canal or foraminal narrowing.   There is no evidence of bulging or herniated disc.   L2/L3   There is normal alignment and vertebral body height. The disc space   is normal. There is no evidence of canal or foraminal narrowing.   There is no evidence of bulging or herniated disc.   L3/L4   Asymmetrical bulging disc to the left with circumferentially oriented   annular tear. No canal or foraminal narrowing.   L4/L5   Trace spondylolisthesis and facet hypertrophy without canal stenosis.   Mild

## 2025-04-16 ENCOUNTER — OFFICE VISIT (OUTPATIENT)
Dept: HEMATOLOGY/ONCOLOGY | Facility: CLINIC | Age: 70
End: 2025-04-16
Payer: MEDICARE

## 2025-04-16 VITALS
TEMPERATURE: 97.2 F | SYSTOLIC BLOOD PRESSURE: 132 MMHG | HEART RATE: 86 BPM | DIASTOLIC BLOOD PRESSURE: 72 MMHG | WEIGHT: 154.8 LBS | BODY MASS INDEX: 25 KG/M2 | OXYGEN SATURATION: 96 %

## 2025-04-16 DIAGNOSIS — M85.80 OSTEOPENIA, UNSPECIFIED LOCATION: ICD-10-CM

## 2025-04-16 DIAGNOSIS — Z17.0 MALIGNANT NEOPLASM OF UPPER-OUTER QUADRANT OF RIGHT BREAST IN FEMALE, ESTROGEN RECEPTOR POSITIVE: ICD-10-CM

## 2025-04-16 DIAGNOSIS — Z79.811 ENCOUNTER FOR MONITORING ANASTROZOLE THERAPY: ICD-10-CM

## 2025-04-16 DIAGNOSIS — C50.411 MALIGNANT NEOPLASM OF UPPER-OUTER QUADRANT OF RIGHT BREAST IN FEMALE, ESTROGEN RECEPTOR POSITIVE: ICD-10-CM

## 2025-04-16 DIAGNOSIS — Z78.0 MENOPAUSE: ICD-10-CM

## 2025-04-16 DIAGNOSIS — Z92.3 PERSONAL HISTORY OF IRRADIATION: ICD-10-CM

## 2025-04-16 DIAGNOSIS — Z08 ENCOUNTER FOR FOLLOW-UP SURVEILLANCE OF BREAST CANCER: ICD-10-CM

## 2025-04-16 DIAGNOSIS — Z51.81 ENCOUNTER FOR MONITORING ANASTROZOLE THERAPY: ICD-10-CM

## 2025-04-16 DIAGNOSIS — Z12.31 ENCOUNTER FOR SCREENING MAMMOGRAM FOR MALIGNANT NEOPLASM OF BREAST: ICD-10-CM

## 2025-04-16 DIAGNOSIS — Z85.3 ENCOUNTER FOR FOLLOW-UP SURVEILLANCE OF BREAST CANCER: ICD-10-CM

## 2025-04-16 PROCEDURE — 99214 OFFICE O/P EST MOD 30 MIN: CPT | Performed by: NURSE PRACTITIONER

## 2025-04-16 PROCEDURE — 1036F TOBACCO NON-USER: CPT | Performed by: NURSE PRACTITIONER

## 2025-04-16 PROCEDURE — 1126F AMNT PAIN NOTED NONE PRSNT: CPT | Performed by: NURSE PRACTITIONER

## 2025-04-16 PROCEDURE — 1160F RVW MEDS BY RX/DR IN RCRD: CPT | Performed by: NURSE PRACTITIONER

## 2025-04-16 PROCEDURE — 1159F MED LIST DOCD IN RCRD: CPT | Performed by: NURSE PRACTITIONER

## 2025-04-16 ASSESSMENT — PATIENT HEALTH QUESTIONNAIRE - PHQ9
2. FEELING DOWN, DEPRESSED OR HOPELESS: NOT AT ALL
SUM OF ALL RESPONSES TO PHQ9 QUESTIONS 1 & 2: 0
1. LITTLE INTEREST OR PLEASURE IN DOING THINGS: NOT AT ALL

## 2025-04-16 ASSESSMENT — PAIN SCALES - GENERAL: PAINLEVEL_OUTOF10: 0-NO PAIN

## 2025-04-16 NOTE — PROGRESS NOTES
Patient ID: [place] Alatorre is a 69 y.o. female.  BREAST CANCER DIAGNOSIS:   Breast   AJCC Edition: 8th (AJCC), Diagnosis Date: Sep 2020, Stage(no match), cT1b cN1 cM0 G2    Treatment Synopsis:    BREAST CANCER DIAGNOSIS  cT1N1 ER+(99%)/OK+(40%)/HER2 negative right sided breast cancer w/biopsy proven axillary involvement, low risk mammaprint, luminal A    - 7/31/20 mammographic abnormality on Right following many years of no mammographic screening.  She had not had a mammogram in years    - 8/6/20 Ultrasound guided biopsy at [facility] in [place], [place] found to have Invasive ductal carcinoma with biopsy @ 11:00, also with focal mucinous features. ER+(99%)/OK+(40%)/HER2 negative. Axillary biopsy ipsilateral positive for breast cancer.     -9/18/20  staging scans which were negative for any definitive evidence of distant metastases. Borderline  hilar and mediastinal nodes, largest measuring 1.3cm which require f/u.    -9/25/20 Medical oncology consult at  with Dr. Steve Negrete. Tissue to be tested for mammaprint.  Initiated for neoadjuvant endocrine therapy w/anastrozole. Mammaprint low risk luminal A    - 10/2020 Surgical oncology consult with Dr. Remi Glover    -2/15/21 Right breast lumpectomy after magseed localization, Right axillary sentinel node biopsy with Dr. Remi Glover    - 4/29/21--6/4/21 50 Gy in 25 Fx R tangs followed by boost (1000 cGy) radiation therapy x 30 treatments with Dr. Marques Cruz      Past Medical History: [place]ham has a past medical history of Arthritis, Breast cancer (Multi) (02/11/2021), Diabetes mellitus (Multi), Frozen shoulder, Low back pain, Lumbosacral disc disease, Other conditions influencing health status (03/12/2021), Personal history of irradiation, Personal history of other diseases of the musculoskeletal system and connective tissue (10/29/2020), Personal history of other endocrine, nutritional and metabolic disease (10/29/2020), Scoliosis,  Spinal stenosis, and Spondylolisthesis.    Social History:  3 sons. They live locally to [place].    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 Quit Smoking approx . 35 year hx 1ppd,  still smokes.   Family History:    Family History   Problem Relation Name Age of Onset    Other (cardiac disorder) Mother Angella Kelsey     Diabetes Mother Angella Kelsey     Other (cardiac disorder) Father Krunal Kelsey     Other (malignant neoplasm of esophagus) Father Krunal Kelsey     Scoliosis Father Krunal Low     Cancer Father Krunal Kelsey     Diabetes Maternal Grandmother Pamela Montano     Other (malignant neoplasm of stomach) Maternal Grandfather Jorge Montano     Diabetes Maternal Grandfather Jorge Montano     Other (malignant neoplasm of oral cavity) Paternal Grandmother Tiffanie Kelsey     Cancer Paternal Grandmother Tiffanie Kelsey     Breast cancer Mother's Sister      Breast cancer Other maternal cousin     Other (malignant neoplasm of esophagus) Other maternal cousin     Breast cancer Father's Sister       Family Oncology History:  Cancer-related family history includes Breast cancer in her father's sister, mother's sister, and another family member; Cancer in her father and paternal grandmother.      BREAST CANCER DIAGNOSIS  [id number] ER+/IA+/HER2- breast cancer luminal A, low risk Mammaprint      CURRENT THERAPY  has been on anastrozole since 2020-- initially preoperative, then adjuvant.  Bisphosphonate therapy with Zometa initiated 10/29/21    HISTORY OF PRESENT ILLNESS:  [place] Alatorre is a 69 y.o. female who comes today for breast cancer treatment follow-up, surveillance. Today I have reviewed with the patient I will be conducting a clinical physical breast exam. Patient has declined a second medical professional today duirng the exam as a chapperone.      Continued compliance with daily anastrazole. She would like to  continue this past 5 years. She denies any breast cancer concerns. She is wanting to see derm locally.     She continues on ozempic for DM per PCP without issues. Reports fatigue is improved now. Reports sleep is stable.     She denies any chest pain or breathing issues, sob. She continues to be tobacco free but  smokes.     She denies any vision changes and will have cataract surgery soon. She denies any issues with headache issues, dizziness or loss of balance or falls.     She reports baseline issues with left shoulder pain and improvement in pain, has not needed to see Dr Beatty in a while. Does water aerobics + chair yoga. She has baseline back pain from previous spinal fusion and hardware- is on Norco every day for this and is established with MD Mary. She denies any new concerning bone aches or bone pains.     She continues Vit D weekly high dose supplementation for many years through her PCP.     She denies any skin lesions or masses, oral sores lesions or infections. Continues to use right arm lymphedema sleeve and glove at night and has no issues. She is compliant with massage. She reports unchanged intermittent mild swelling under her left eye that comes and goes, non tender, was seen by eye dr, negative scans.     She reports a normal appetite but often eats smaller more frequent meals. She reports normal bowel movements. She reports normal urination.     Review of Systems - Oncology  ROS 14 points performed, See HPI for exceptions    OBJECTIVE:    VS / Pain:  /72 (BP Location: Right arm, Patient Position: Sitting, BP Cuff Size: Small adult)   Pulse 86   Temp 36.2 °C (97.2 °F) (Temporal)   Wt 70.2 kg (154 lb 12.8 oz)   SpO2 96%   BMI 25.00 kg/m²   BSA: 1.81 meters squared   Pain Scale: 3      Physical Exam  Constitutional: Well developed, awake/alert/oriented x4, no distress, alert and cooperative  EYES: Sclera clear  ENMT: mucous membranes moist, no apparent injury, no lesions  seen  Head/Neck: Neck supple, no apparent injury, thyroid without mass or tenderness, No JVD, trachea midline, no bruits. No apparent swelling under left eye, no swelling of lacrimal sac  Respiratory / Thoracic: Patent airways, clear to all lobes, normal breath sounds with good chest expansion, thorax symmetric.  Cardiovascular: Regular, rate and rhythm, no murmurs, 2+ equal pulses of the extremities, normal auscultated S 1and S 2  GI: Nondistended, soft, non-tender, no rebound tenderness or guarding, no masses palpable, no organomegaly, +BS, no bruits  Musculoskeletal: ROM intact, no joint swelling, normal strength, no spinal tenderness. Positive for left sided rib pain upon deep palpation.   Extremities: normal extremities, no cyanosis edema, contusions or wounds, no clubbing  Neurological: alert and oriented x4, intact senses, motor, response and reflexes, normal strength  Breast: Left chest and axilla supple, free of masses or lesions. Right chest s/p lumpectomy with well healed incision- chest and axilla with mild skin thickening around nipple s/p RT; no evidence of chest or axilla masses or lesions bilaterally   Lymphatic: No cervical, supraclavicular, infraclavicular or axillary lymphadenopathy  Psychological: Appropriate and talkative mood and behavior  Skin: Warm and dry, no lesions, no rashes, no jaundice    Diagnostic Results   Narrative & Impression  Interpreted By:  Clary Rosales,   STUDY:  BI MAMMO BILATERAL SCREENING TOMOSYNTHESIS;  10/16/2024 11:06 am      ACCESSION NUMBER(S):  [id number]      ORDERING CLINICIAN:  ELOY AARON      INDICATION:  Screening. Right lumpectomy with radiation. Family history of breast  cancer.      ,C50.411 Malignant neoplasm of upper-outer quadrant of right female  breast,Z17.0 Estrogen receptor positive status (ER+)      COMPARISON:  10/10/2023 and 10/04/2022.      FINDINGS:  2D and tomosynthesis images were reviewed at 1 mm slice thickness.      Density:  The  breasts are heterogeneously dense, which may obscure  small masses.      Postsurgical scarring with associated clips is again seen in the  superior lateral right breast at mid to posterior depth. Right. Other  skin thickening persists. No suspicious masses or calcifications are  identified.      IMPRESSION:  Stable right breast post treatment changes. No mammographic evidence  of malignancy in either breast.      BI-RADS CATEGORY:  BI-RADS Category:  2 Benign.  Recommendation:  Annual Screening.  Recommended Date:  1 Year.  Laterality:  Bilateral.       Assessment/Plan   Malignant neoplasm of upper-outer quadrant of right female breast, Clinical: Stage IB (cT1b, cN1, cM0, G2, ER+, SC+, HER2-)  Diagnoses and all orders for this visit:  Osteopenia, unspecified location (Primary)  Menopause  Malignant neoplasm of upper-outer quadrant of right breast in female, estrogen receptor positive  Encounter for monitoring anastrozole therapy  Personal history of irradiation  Encounter for follow-up surveillance of breast cancer        Problem List Items Addressed This Visit       Malignant neoplasm of upper-outer quadrant of right female breast    Encounter for monitoring anastrozole therapy    Osteopenia - Primary    Personal history of irradiation    Encounter for follow-up surveillance of breast cancer    Menopause          [id number] ER+/SC+/HER2- breast cancer luminal A, low risk Mammaprint.   l[place] is doing well today, continues to tolerate anastrazole since 9/2020. Goal is 5 years -Sept 2025 however today we discussed with good tolerance can continue to total of 7-10 year course. Updated rx for a full year    There is no evidence on clinical exam today for breast cancer recurrence.  Plan to RTC in 6 months with annual imaging.     Osteopenia.   Zometa initiated in October 2021 without any major issues. Final dose completed  #6 May 2024.    Repeat DEXA 3/2024 Osteopenia T-score -1.7. Will repeat Summer 2026- orders  placed for April / May 2026      Lymphedema.   Stable right arm and hand lymph swelling. She is performing self massage Right arm and breast, compliant with compression garments at night       General health maintenance   Continues to follow with Dr. Mota, annually and as needed.     At least 25 minutes of direct consultation was spent with the patient today reviewing her cancer care plan, educating and answering questions regarding ongoing follow up, greater than 50% in counseling and coordination of care      Treatment Plan:  [No matching plan found]          Thank you for the opportunity to be involved in the care of [place] Alator[place].   We discussed the clinical significance of diagnosis, goals of care and treatment plan in detail.  Please do not hesitate to reach out with any questions. Thank you.     ----------------------------------------------------------------------------------------------------------------------------    Latha Tapia MSN, APRN, FNP-C  [facility]  Division of Medical Oncology- Breast   Collaborating Physician Dr. Steve Negrete   [facility] Breast Disease Team   [place] [id number]  Phone: [phone]  Fax: [phone]  Available via Secure Chat    Confidential Peer Review Document-  Privilege  Privileged Pursuant to [place] Revised Code Section 2305.24, .25, .251 & .252

## 2025-04-16 NOTE — PATIENT INSTRUCTIONS
Latha Tapia APRDINA, CNP follow-up 6 months with annual mammogram and then planned annually      Mammogram is due AFTER 10/16/25    5 year course of anastrozole would complete Sept 2025, however today we discussed continued for a 6th year and possibly longer with good tolerance     Bone density repeat 3/2024 with continued moderate osteopenia. Will repeat Summer 2026- orders placed today please schedule      PCP Dr Mota annually and as needed.      Call with any questions, concerns or treatment intolerance prior to next office visit [phone].

## 2025-05-13 ENCOUNTER — OFFICE VISIT (OUTPATIENT)
Dept: PAIN MANAGEMENT | Age: 70
End: 2025-05-13
Payer: MEDICARE

## 2025-05-13 VITALS
HEART RATE: 94 BPM | RESPIRATION RATE: 16 BRPM | SYSTOLIC BLOOD PRESSURE: 118 MMHG | WEIGHT: 156 LBS | TEMPERATURE: 97 F | HEIGHT: 65 IN | BODY MASS INDEX: 25.99 KG/M2 | DIASTOLIC BLOOD PRESSURE: 68 MMHG | OXYGEN SATURATION: 98 %

## 2025-05-13 DIAGNOSIS — M47.812 CERVICAL SPONDYLOSIS: ICD-10-CM

## 2025-05-13 DIAGNOSIS — M47.812 CERVICAL FACET JOINT SYNDROME: ICD-10-CM

## 2025-05-13 DIAGNOSIS — M16.0 PRIMARY OSTEOARTHRITIS OF BOTH HIPS: ICD-10-CM

## 2025-05-13 DIAGNOSIS — M41.9 SCOLIOSIS OF THORACIC SPINE, UNSPECIFIED SCOLIOSIS TYPE: Primary | ICD-10-CM

## 2025-05-13 DIAGNOSIS — M47.816 LUMBAR SPONDYLOSIS: ICD-10-CM

## 2025-05-13 DIAGNOSIS — M54.16 LUMBAR RADICULOPATHY: ICD-10-CM

## 2025-05-13 DIAGNOSIS — M50.90 CERVICAL DISC DISORDER: ICD-10-CM

## 2025-05-13 DIAGNOSIS — M47.814 THORACIC SPONDYLOSIS: ICD-10-CM

## 2025-05-13 DIAGNOSIS — G89.4 CHRONIC PAIN SYNDROME: ICD-10-CM

## 2025-05-13 DIAGNOSIS — M51.9 LUMBAR DISC DISORDER: ICD-10-CM

## 2025-05-13 PROCEDURE — 1159F MED LIST DOCD IN RCRD: CPT | Performed by: PAIN MEDICINE

## 2025-05-13 PROCEDURE — 99214 OFFICE O/P EST MOD 30 MIN: CPT | Performed by: PAIN MEDICINE

## 2025-05-13 PROCEDURE — 1123F ACP DISCUSS/DSCN MKR DOCD: CPT | Performed by: PAIN MEDICINE

## 2025-05-13 PROCEDURE — 1160F RVW MEDS BY RX/DR IN RCRD: CPT | Performed by: PAIN MEDICINE

## 2025-05-13 RX ORDER — SODIUM CHLORIDE 0.9 % (FLUSH) 0.9 %
5-40 SYRINGE (ML) INJECTION EVERY 12 HOURS SCHEDULED
OUTPATIENT
Start: 2025-05-13

## 2025-05-13 RX ORDER — HYDROCODONE BITARTRATE AND ACETAMINOPHEN 5; 325 MG/1; MG/1
1 TABLET ORAL DAILY PRN
Qty: 30 TABLET | Refills: 0 | Status: SHIPPED | OUTPATIENT
Start: 2025-05-13 | End: 2025-06-12

## 2025-05-13 RX ORDER — SODIUM CHLORIDE 0.9 % (FLUSH) 0.9 %
5-40 SYRINGE (ML) INJECTION PRN
OUTPATIENT
Start: 2025-05-13

## 2025-05-13 RX ORDER — SODIUM CHLORIDE 9 MG/ML
INJECTION, SOLUTION INTRAVENOUS PRN
OUTPATIENT
Start: 2025-05-13

## 2025-05-13 RX ORDER — GABAPENTIN 300 MG/1
300 CAPSULE ORAL 2 TIMES DAILY
Qty: 60 CAPSULE | Refills: 0 | Status: SHIPPED | OUTPATIENT
Start: 2025-05-13 | End: 2025-06-12

## 2025-05-13 NOTE — PROGRESS NOTES
Gabi Orourke presents to the [facility] on 5/13/2025. Gabi is complaining of pain in her lower back that radiates to LLE. The pain is constant. The pain is described as sharp. Pain is rated on her best day at a 8, on her worst day at a 10, and on average at a 8 on the VAS scale. She took her last dose of mobic and aleve today, Norco ran out over 1 month ago.    Any procedures since your last visit: No    She is  on NSAIDS and  is not on anticoagulation medications to include none and is managed by NA.     Pacemaker or defibrillator: No    Medication Contract and Consent for Opioid Use Documents Filed        No documents found                       There were no vitals taken for this visit.     No LMP recorded. Patient has had a hysterectomy.  
opioids, benzodiazepines, alcohol, illicit drugs or sleep aids increases the risk of respiratory depression including death. We discussed that these medications may cause drowsiness, sedation or dizziness and have counseled the patient not to drive or operate machinery. We have discussed that these medications will be prescribed only by one provider. We have discussed with the patient about age related risk factors and have thoroughly discussed the importance of taking these medications as prescribed. The patient verbalizes understanding.    chasity Don M.D.

## 2025-05-14 ENCOUNTER — PREP FOR PROCEDURE (OUTPATIENT)
Age: 70
End: 2025-05-14

## 2025-05-14 ENCOUNTER — TELEPHONE (OUTPATIENT)
Age: 70
End: 2025-05-14

## 2025-05-14 RX ORDER — MELOXICAM 15 MG/1
15 TABLET ORAL DAILY PRN
Qty: 30 TABLET | Refills: 2 | OUTPATIENT
Start: 2025-05-14

## 2025-05-14 RX ORDER — MELOXICAM 15 MG/1
15 TABLET ORAL DAILY PRN
Qty: 30 TABLET | Refills: 2 | Status: SHIPPED | OUTPATIENT
Start: 2025-05-14 | End: 2025-08-12

## 2025-05-14 NOTE — TELEPHONE ENCOUNTER
Call to Gabi Orourke that procedure was scheduled for 05/19/2025 and that [facility] should call her a few days before for the pre op call and after 3:00 PM the business day before with the arrival time. Instructed Gabi to hold ibuprofen for 24 hours, Celebrex, Mobic, and naprosyn for 4 days and any aspirin containing products, CoQ 10, or fish oil for 7 days. Instructed to call office back if any questions. Gabi verbalized understanding.    Electronically signed by Vicky Guerra RN on 5/14/2025 at 11:25 AM

## 2025-05-19 ENCOUNTER — HOSPITAL ENCOUNTER (OUTPATIENT)
Age: 70
Setting detail: OUTPATIENT SURGERY
Discharge: HOME | End: 2025-05-19
Attending: PAIN MEDICINE | Admitting: PAIN MEDICINE
Payer: MEDICARE

## 2025-05-19 ENCOUNTER — HOSPITAL ENCOUNTER (OUTPATIENT)
Dept: OPERATING ROOM | Age: 70
Setting detail: OUTPATIENT SURGERY
Discharge: HOME | End: 2025-05-19
Attending: PAIN MEDICINE
Payer: MEDICARE

## 2025-05-19 VITALS
HEART RATE: 90 BPM | HEIGHT: 65 IN | WEIGHT: 156 LBS | OXYGEN SATURATION: 97 % | SYSTOLIC BLOOD PRESSURE: 129 MMHG | BODY MASS INDEX: 25.99 KG/M2 | RESPIRATION RATE: 16 BRPM | DIASTOLIC BLOOD PRESSURE: 58 MMHG | TEMPERATURE: 98 F

## 2025-05-19 DIAGNOSIS — M51.9 LUMBAR DISC DISEASE: ICD-10-CM

## 2025-05-19 PROCEDURE — 6360000002 HC RX W HCPCS: Performed by: PAIN MEDICINE

## 2025-05-19 PROCEDURE — 3600000005 HC SURGERY LEVEL 5 BASE: Performed by: PAIN MEDICINE

## 2025-05-19 PROCEDURE — 7100000010 HC PHASE II RECOVERY - FIRST 15 MIN: Performed by: PAIN MEDICINE

## 2025-05-19 PROCEDURE — 64484 NJX AA&/STRD TFRM EPI L/S EA: CPT | Performed by: PAIN MEDICINE

## 2025-05-19 PROCEDURE — 7100000011 HC PHASE II RECOVERY - ADDTL 15 MIN: Performed by: PAIN MEDICINE

## 2025-05-19 PROCEDURE — 64483 NJX AA&/STRD TFRM EPI L/S 1: CPT | Performed by: PAIN MEDICINE

## 2025-05-19 PROCEDURE — 2709999900 HC NON-CHARGEABLE SUPPLY: Performed by: PAIN MEDICINE

## 2025-05-19 PROCEDURE — 6360000004 HC RX CONTRAST MEDICATION: Performed by: PAIN MEDICINE

## 2025-05-19 RX ORDER — SODIUM CHLORIDE 0.9 % (FLUSH) 0.9 %
5-40 SYRINGE (ML) INJECTION PRN
Status: DISCONTINUED | OUTPATIENT
Start: 2025-05-19 | End: 2025-05-19 | Stop reason: HOSPADM

## 2025-05-19 RX ORDER — SODIUM CHLORIDE 9 MG/ML
INJECTION, SOLUTION INTRAVENOUS PRN
Status: DISCONTINUED | OUTPATIENT
Start: 2025-05-19 | End: 2025-05-19 | Stop reason: HOSPADM

## 2025-05-19 RX ORDER — SODIUM CHLORIDE 0.9 % (FLUSH) 0.9 %
5-40 SYRINGE (ML) INJECTION EVERY 12 HOURS SCHEDULED
Status: DISCONTINUED | OUTPATIENT
Start: 2025-05-19 | End: 2025-05-19 | Stop reason: HOSPADM

## 2025-05-19 RX ORDER — LIDOCAINE HYDROCHLORIDE 5 MG/ML
INJECTION, SOLUTION INFILTRATION; INTRAVENOUS PRN
Status: DISCONTINUED | OUTPATIENT
Start: 2025-05-19 | End: 2025-05-19 | Stop reason: ALTCHOICE

## 2025-05-19 ASSESSMENT — PAIN - FUNCTIONAL ASSESSMENT
PAIN_FUNCTIONAL_ASSESSMENT: 0-10

## 2025-05-19 ASSESSMENT — PAIN DESCRIPTION - DESCRIPTORS: DESCRIPTORS: ACHING;BURNING

## 2025-05-19 NOTE — OP NOTE
foramen was entered . A lateral fluoroscopic view was then used to place the needle tip in the middle of the foramen. Negative aspiration was confirmed for blood and CSF and 0.5 cc of Omnipaque 240 contrast was injected at each level under live fluoroscopy. Appropriate neurograms were observed under AP fluoroscopy. Then after negative aspiration, a solution of the 2 cc of 0.5% lidocaine and 40 mg DepoMedrol was easily injected at each level. The needles were removed with constant aspiration technique. The patient back was cleaned and a bandage was placed over the needle insertion points    Disposition the patient tolerated the procedure well and there were no complications . Vital signs remained stable throughout the procedure. The patient was escorted to the recovery area where they remained until discharge and written discharge instructions for the procedure were given.    Plan: Gabi will return to our pain management center as scheduled.     JARED MCCLAIN MD

## 2025-05-19 NOTE — DISCHARGE INSTRUCTIONS
[facility] Department  [phone]   Post-Pain Block/ Radiofrequency Home Going Instructions    1-Go home, rest for the remainder of the day  2-Please do not lift over 20 pounds the day of the injection  3-If you received sedation No: alcohol, driving, operating lawn mowers, plows, tractors or other dangerous equipment until next morning. Do not make important decisions or sign legal documents for 24 hours. You may experience light headedness, dizziness, nausea or sleepiness after sedation. Do not stay alone. A responsible adult must be with you for 24 hours. You could be nauseated from the medications you have received. Your IV site may be sore and bruised.    4-No dietary restrictions     5-Resume all medications the same day, blood thinners to be resumed 24 hours after injection    6-Keep the surgical site clean and dry, you may shower the next morning and remove the      dressing.     7- No sitz baths, tub baths or hot tubs/swimming for 24 hours.       8- If you have any pain at the injection site(s), application of an ice pack to the area should be       helpful, 20 minutes on/20 minutes off for next 48 hours.  9- Call [facility] pain management immediately at if you develop.  Fever greater than 100.4 F  Have bleeding or drainage from the puncture site  Have progressive Leg/arm numbness and or weakness  Loss of control of bowel and or bladder (wet/soil yourself)  Severe headache with inability to lift head  10-You may return to work the next day

## 2025-05-19 NOTE — H&P
[facility]  [address], [place] [address]  [phone]    Procedure History & Physical      Gabi CONTRERAS Shantellejenellemarkell     HPI:    Patient  is here for low back and left lower extremity pain for Left L4 and L5 TFESI.  Labs/imaging studies reviewed   All question and concerns addressed including R/B/A associated with the procedure    Past Medical History:   Diagnosis Date    Acquired hypothyroidism 06/30/2022    Anxiety     Anxiety and depression 02/08/2022    Breast cancer (HCC)     right- 2021    COVID 08/2022    Degenerative joint disease of both hips     Depression     Diverticulosis     Fibrocystic breast     History of cardiovascular stress test 07/18/2013    stress echo done    Hypertension     Invasive ductal carcinoma of breast (HCC)     Reactive airway disease     Scoliosis     Scoliosis     Type 2 diabetes mellitus without complication (HCC)     PO meds only    Type 2 diabetes mellitus without complication, without long-term current use of insulin (HCC) 02/08/2022    Urolithiasis     Vitamin D deficiency     Zoster        Past Surgical History:   Procedure Laterality Date    BACK SURGERY  1973    scoliosis, cai bars    BREAST LUMPECTOMY Right 2020    COLONOSCOPY N/A 03/01/2022    Dr. Barrios.  Follow-up 5 years.    DILATION AND CURETTAGE OF UTERUS  07/1993    ECHOCARDIOGRAM EXERCISE STRESS TEST  07/18/2013         HIP SURGERY Left 09/23/2024    Left hip injection under Xray performed by Neri Orantes MD at [facility] OR    HIP SURGERY Left 12/09/2024    Left hip injection under Xray performed by Neri Orantes MD at [facility] OR    HYSTERECTOMY (CERVIX STATUS UNKNOWN) Right     [facility] US GUIDED PLACE LOC DEVICE PERC 1ST LESION  08/31/2020    [facility] US PERQ DEVICE SOFT TISSUE PLMT  FIRST LESION 8/31/2020 SE[facility] ABDU [facility]    PAIN MANAGEMENT PROCEDURE Bilateral 11/02/2023    Bilateral L3,4,5 medial branch block. performed by Neri Orantes MD at [facility]

## 2025-05-29 RX ORDER — ATORVASTATIN CALCIUM 20 MG/1
20 TABLET, FILM COATED ORAL DAILY
Qty: 90 TABLET | Refills: 1 | Status: SHIPPED | OUTPATIENT
Start: 2025-05-29

## 2025-05-29 RX ORDER — GLIPIZIDE 5 MG/1
5 TABLET, FILM COATED, EXTENDED RELEASE ORAL
Qty: 90 TABLET | Refills: 1 | Status: SHIPPED | OUTPATIENT
Start: 2025-05-29

## 2025-05-29 RX ORDER — LEVOTHYROXINE SODIUM 25 UG/1
25 TABLET ORAL DAILY
Qty: 90 TABLET | Refills: 1 | Status: SHIPPED | OUTPATIENT
Start: 2025-05-29

## 2025-05-29 NOTE — TELEPHONE ENCOUNTER
Requested Prescriptions     Pending Prescriptions Disp Refills    glipiZIDE (GLUCOTROL XL) 5 MG extended release tablet [Pharmacy Med Name: GLIPIZIDE ER 5 MG TABLET] 90 tablet 1     Sig: TAKE 1 TABLET BY MOUTH EVERY DAY BEFORE BREAKFAST    levothyroxine (SYNTHROID) 25 MCG tablet [Pharmacy Med Name: LEVOTHYROXINE 25 MCG TABLET] 90 tablet 1     Sig: TAKE 1 TABLET BY MOUTH EVERY DAY    atorvastatin (LIPITOR) 20 MG tablet [Pharmacy Med Name: ATORVASTATIN 20 MG TABLET] 90 tablet 1     Sig: TAKE 1 TABLET BY MOUTH EVERY DAY       Next appt is 8/20/2025  Last appt was 2/11/2025

## 2025-06-06 ENCOUNTER — OFFICE VISIT (OUTPATIENT)
Age: 70
End: 2025-06-06
Payer: MEDICARE

## 2025-06-06 VITALS
SYSTOLIC BLOOD PRESSURE: 110 MMHG | TEMPERATURE: 96.8 F | WEIGHT: 156 LBS | RESPIRATION RATE: 18 BRPM | HEART RATE: 91 BPM | BODY MASS INDEX: 25.99 KG/M2 | DIASTOLIC BLOOD PRESSURE: 58 MMHG | HEIGHT: 65 IN | OXYGEN SATURATION: 96 %

## 2025-06-06 DIAGNOSIS — M41.9 SCOLIOSIS OF THORACIC SPINE, UNSPECIFIED SCOLIOSIS TYPE: ICD-10-CM

## 2025-06-06 DIAGNOSIS — M25.552 PAIN IN LEFT HIP: Primary | ICD-10-CM

## 2025-06-06 DIAGNOSIS — M16.12 PRIMARY OSTEOARTHRITIS OF LEFT HIP: ICD-10-CM

## 2025-06-06 DIAGNOSIS — M47.814 THORACIC SPONDYLOSIS: ICD-10-CM

## 2025-06-06 DIAGNOSIS — M47.816 LUMBAR SPONDYLOSIS: ICD-10-CM

## 2025-06-06 DIAGNOSIS — M47.812 CERVICAL SPONDYLOSIS: ICD-10-CM

## 2025-06-06 PROCEDURE — 1123F ACP DISCUSS/DSCN MKR DOCD: CPT | Performed by: PAIN MEDICINE

## 2025-06-06 PROCEDURE — 99214 OFFICE O/P EST MOD 30 MIN: CPT | Performed by: PAIN MEDICINE

## 2025-06-06 PROCEDURE — 1159F MED LIST DOCD IN RCRD: CPT | Performed by: PAIN MEDICINE

## 2025-06-06 PROCEDURE — 1160F RVW MEDS BY RX/DR IN RCRD: CPT | Performed by: PAIN MEDICINE

## 2025-06-06 RX ORDER — MELOXICAM 15 MG/1
15 TABLET ORAL DAILY PRN
Qty: 30 TABLET | Refills: 2 | Status: SHIPPED | OUTPATIENT
Start: 2025-06-06 | End: 2025-09-04

## 2025-06-06 RX ORDER — SODIUM CHLORIDE 0.9 % (FLUSH) 0.9 %
5-40 SYRINGE (ML) INJECTION PRN
OUTPATIENT
Start: 2025-06-06

## 2025-06-06 RX ORDER — HYDROCODONE BITARTRATE AND ACETAMINOPHEN 5; 325 MG/1; MG/1
1 TABLET ORAL DAILY PRN
Qty: 30 TABLET | Refills: 0 | Status: SHIPPED | OUTPATIENT
Start: 2025-06-13 | End: 2025-07-13

## 2025-06-06 RX ORDER — SODIUM CHLORIDE 9 MG/ML
INJECTION, SOLUTION INTRAVENOUS PRN
OUTPATIENT
Start: 2025-06-06

## 2025-06-06 RX ORDER — SODIUM CHLORIDE 0.9 % (FLUSH) 0.9 %
5-40 SYRINGE (ML) INJECTION EVERY 12 HOURS SCHEDULED
OUTPATIENT
Start: 2025-06-06

## 2025-06-06 RX ORDER — GABAPENTIN 300 MG/1
300 CAPSULE ORAL 2 TIMES DAILY
Qty: 60 CAPSULE | Refills: 0 | Status: SHIPPED | OUTPATIENT
Start: 2025-06-13 | End: 2025-07-13

## 2025-06-06 NOTE — PROGRESS NOTES
++  [facility]  [address], [place] [address]  [phone]    Follow up Note      Gabi Orourke     Date of Visit:  6/6/2025    CC:  Patient presents for follow up   Chief Complaint   Patient presents with    Follow-up     Left Lumbar 4 and Lumbar 5 Transforaminal epidural steroid injection       HPI:  Follow up on her low back with radiation to the Left lower extremity and no acute issues.  Appropriate analgesia with current medications regimen:yes fair   Change in quality of symptoms:Increased Left groin pain  Medication side effects:none  Recent diagnostic testing:none  Recent interventional procedures:Left L4 and L5 TFESI with more than 75% in her leg pain.    She has not been on anticoagulation medications to include none. The patient  has not been on herbal supplements.  The patient is diabetic.     Imaging:   Lumbar spine MRI 2024  1. Degenerative change.  Slight grade 1 anterolisthesis at L4-5 and slight  grade 1 retrolisthesis at L5-S1.  Mild levoscoliosis.  2. Mild central canal stenosis at L4-5.  3. Multilevel neural foraminal stenosis, most prominent (moderate to severe)  on the left at L5-S1.  4. Small left-sided disc protrusion at L1-2 causing moderate left neural  foraminal stenosis and slight impression on the left subarticular recess.    Lumbar spine MRI 2020:  L1/L2   There is normal alignment and vertebral body height. The disc space   is normal. There is no evidence of canal or foraminal narrowing.   There is no evidence of bulging or herniated disc.   L2/L3   There is normal alignment and vertebral body height. The disc space   is normal. There is no evidence of canal or foraminal narrowing.   There is no evidence of bulging or herniated disc.   L3/L4   Asymmetrical bulging disc to the left with circumferentially oriented   annular tear. No canal or foraminal narrowing.   L4/L5   Trace spondylolisthesis and facet hypertrophy without canal

## 2025-06-06 NOTE — PROGRESS NOTES
Gabi Orourke presents to the [facility] on 6/6/2025. Gabi is complaining of pain lower back, left leg and hip. The pain is constant. The pain is described as burning. Pain is rated on her best day at a 5, on her worst day at a 9, and on average at a 5 on the VAS scale. She took her last dose of Mobic, Norco, and Neurontin today.      Any procedures since your last visit: Yes, with 50 % relief.    She is  on NSAIDS and  is not on anticoagulation medications   Pacemaker or defibrillator: No  Do you want someone present when the provider examines you? No    Medication Contract and Consent for Opioid Use Documents Filed        No documents found                       Resp 18   Ht 1.651 m (5' 5\")   Wt 70.8 kg (156 lb)   BMI 25.96 kg/m²      No LMP recorded. Patient has had a hysterectomy.

## 2025-06-27 ENCOUNTER — TELEPHONE (OUTPATIENT)
Age: 70
End: 2025-06-27

## 2025-06-27 NOTE — TELEPHONE ENCOUNTER
Call to Gabi Orourke that procedure was scheduled for 07/07/2025 and that [facility] should call her a few days before for the pre op call and after 3:00 PM the business day before with the arrival time. Instructed to call office back if any questions. Gabi verbalized understanding.    Electronically signed by Vicky Guerra RN on 6/27/2025 at 3:32 PM

## 2025-07-07 ENCOUNTER — HOSPITAL ENCOUNTER (OUTPATIENT)
Dept: OPERATING ROOM | Age: 70
Setting detail: OUTPATIENT SURGERY
Discharge: HOME | End: 2025-07-07
Attending: PAIN MEDICINE
Payer: MEDICARE

## 2025-07-07 ENCOUNTER — HOSPITAL ENCOUNTER (OUTPATIENT)
Age: 70
Setting detail: OUTPATIENT SURGERY
Discharge: HOME | End: 2025-07-07
Attending: PAIN MEDICINE | Admitting: PAIN MEDICINE
Payer: MEDICARE

## 2025-07-07 VITALS
WEIGHT: 155 LBS | OXYGEN SATURATION: 96 % | HEART RATE: 90 BPM | HEIGHT: 65 IN | DIASTOLIC BLOOD PRESSURE: 56 MMHG | RESPIRATION RATE: 16 BRPM | SYSTOLIC BLOOD PRESSURE: 128 MMHG | BODY MASS INDEX: 25.83 KG/M2

## 2025-07-07 DIAGNOSIS — M25.552 PAIN IN LEFT HIP: ICD-10-CM

## 2025-07-07 PROCEDURE — 7100000011 HC PHASE II RECOVERY - ADDTL 15 MIN: Performed by: PAIN MEDICINE

## 2025-07-07 PROCEDURE — 20610 DRAIN/INJ JOINT/BURSA W/O US: CPT | Performed by: PAIN MEDICINE

## 2025-07-07 PROCEDURE — 7100000010 HC PHASE II RECOVERY - FIRST 15 MIN: Performed by: PAIN MEDICINE

## 2025-07-07 PROCEDURE — 77002 NEEDLE LOCALIZATION BY XRAY: CPT | Performed by: PAIN MEDICINE

## 2025-07-07 PROCEDURE — 3600000005 HC SURGERY LEVEL 5 BASE: Performed by: PAIN MEDICINE

## 2025-07-07 PROCEDURE — 6360000004 HC RX CONTRAST MEDICATION: Performed by: PAIN MEDICINE

## 2025-07-07 PROCEDURE — 6360000002 HC RX W HCPCS: Performed by: PAIN MEDICINE

## 2025-07-07 PROCEDURE — 2709999900 HC NON-CHARGEABLE SUPPLY: Performed by: PAIN MEDICINE

## 2025-07-07 RX ORDER — SODIUM CHLORIDE 0.9 % (FLUSH) 0.9 %
5-40 SYRINGE (ML) INJECTION PRN
Status: DISCONTINUED | OUTPATIENT
Start: 2025-07-07 | End: 2025-07-07 | Stop reason: HOSPADM

## 2025-07-07 RX ORDER — SODIUM CHLORIDE 9 MG/ML
INJECTION, SOLUTION INTRAVENOUS PRN
Status: DISCONTINUED | OUTPATIENT
Start: 2025-07-07 | End: 2025-07-07 | Stop reason: HOSPADM

## 2025-07-07 RX ORDER — SODIUM CHLORIDE 0.9 % (FLUSH) 0.9 %
5-40 SYRINGE (ML) INJECTION EVERY 12 HOURS SCHEDULED
Status: DISCONTINUED | OUTPATIENT
Start: 2025-07-07 | End: 2025-07-07 | Stop reason: HOSPADM

## 2025-07-07 RX ORDER — LIDOCAINE HYDROCHLORIDE 5 MG/ML
INJECTION, SOLUTION INFILTRATION; INTRAVENOUS PRN
Status: DISCONTINUED | OUTPATIENT
Start: 2025-07-07 | End: 2025-07-07 | Stop reason: ALTCHOICE

## 2025-07-07 ASSESSMENT — PAIN DESCRIPTION - DESCRIPTORS: DESCRIPTORS: ACHING

## 2025-07-07 NOTE — H&P
[facility]  [address], [place] [address]  [phone]    Procedure History & Physical      Gabi CONTRERAS Shantellejenellemarkell     HPI:    Patient  is here for Left buttocks pain for Left hip injection.  Labs/imaging studies reviewed   All question and concerns addressed including R/B/A associated with the procedure    Past Medical History:   Diagnosis Date    Acquired hypothyroidism 06/30/2022    Anxiety     Anxiety and depression 02/08/2022    Breast cancer (HCC)     right- 2021    COVID 08/2022    Degenerative joint disease of both hips     Depression     Diverticulosis     Fibrocystic breast     History of cardiovascular stress test 07/18/2013    stress echo done    Hypertension     Invasive ductal carcinoma of breast (HCC)     Reactive airway disease     Scoliosis     Scoliosis     Type 2 diabetes mellitus without complication (HCC)     PO meds only    Type 2 diabetes mellitus without complication, without long-term current use of insulin (HCC) 02/08/2022    Urolithiasis     Vitamin D deficiency     Zoster        Past Surgical History:   Procedure Laterality Date    BACK SURGERY  1973    scoliosis, cai bars    BREAST LUMPECTOMY Right 2020    COLONOSCOPY N/A 03/01/2022    Dr. Barrios.  Follow-up 5 years.    DILATION AND CURETTAGE OF UTERUS  07/1993    ECHOCARDIOGRAM EXERCISE STRESS TEST  07/18/2013         HIP SURGERY Left 09/23/2024    Left hip injection under Xray performed by Neri Orantes MD at [facility] OR    HIP SURGERY Left 12/09/2024    Left hip injection under Xray performed by Neri Orantes MD at [facility] OR    HYSTERECTOMY (CERVIX STATUS UNKNOWN) Right     TIANNA US GUIDED PLACE LOC DEVICE PERC 1ST LESION  08/31/2020    [facility] US PERQ DEVICE SOFT TISSUE PLMT  FIRST LESION 8/31/2020 [facility]    PAIN MANAGEMENT PROCEDURE Bilateral 11/02/2023    Bilateral L3,4,5 medial branch block. performed by Neri Orantes MD at [facility] OR    PAIN MANAGEMENT

## 2025-07-07 NOTE — OP NOTE
2025    Patient: Gabi Orourke  :  1955  Age:  69 y.o.  Sex:  female     PRE-OPERATIVE DIAGNOSIS: Left Hip osteoarthritis, hip pain.    POST-OPERATIVE DIAGNOSIS: Same.    PROCEDURE PERFORMED: Left Hip steroid injection under fluoroscopic guidance.    SURGEON:   Arian DORSEY    ANESTHESIA: Local    ESTIMATED BLOOD LOSS: None.    BRIEF HISTORY: Gabi Orourke comes in today for Left hip steroid injection under fluoroscopic guidance. After discussing the potential risks and benefits of the procedure with the patient  Gabi did request that we proceed. A complete History & Physical was reviewed and it is unchanged.    DESCRIPTION OF PROCEDURE:     After confirming written and informed consent, a time-out was performed and Gabi’s name and date of birth, the procedure to be performed as well as the plan for the location of the needle insertion were confirmed.    Patient was brought into the procedure room and was placed in the lateral decubitus position on the fluoroscopy table. Standard monitors were placed and vital signs were observed throughout the procedure  The area of the Left hip was prepped with chloraprep and draped in a sterile manner. The overlying skin and subcutaneous tissues were anesthetized with 0.5% lidocaine.  At this time, a 22 gauge spinal 3 1/2 inch spinal needle was advanced in lateral view until bone was contacted. At this point AP fluoroscopic view was used and the needle was slightly advanced into the hip joint. 0.5 cc of 240 omnipaque was injected with appropiate contrast spread under live fluoroscopy. A solution of 0.25 % marcaine 4 cc and 40 mg DepoMedrol was injected easily after negative aspiration without complications. The needle was then removed and Band-Aid applied.    Disposition the patient tolerated the procedure well and there were no complications . Vital signs remained stable throughout the procedure. The patient was escorted to the recovery area

## 2025-07-07 NOTE — DISCHARGE INSTRUCTIONS
[facility]  [phone]   Post-Pain Block/ Radiofrequency Home Going Instructions    1-Go home, rest for the remainder of the day  2-Please do not lift over 20 pounds the day of the injection  3-If you received sedation No: alcohol, driving, operating lawn mowers, plows, tractors or other dangerous equipment until next morning. Do not make important decisions or sign legal documents for 24 hours. You may experience light headedness, dizziness, nausea or sleepiness after sedation. Do not stay alone. A responsible adult must be with you for 24 hours. You could be nauseated from the medications you have received. Your IV site may be sore and bruised.    4-No dietary restrictions     5-Resume all medications the same day, blood thinners to be resumed 24 hours after injection    6-Keep the surgical site clean and dry, you may shower the next morning and remove the      dressing.     7- No sitz baths, tub baths or hot tubs/swimming for 24 hours.       8- If you have any pain at the injection site(s), application of an ice pack to the area should be       helpful, 20 minutes on/20 minutes off for next 48 hours.  9- Call [facility] pain management immediately at if you develop.  Fever greater than 100.4 F  Have bleeding or drainage from the puncture site  Have progressive Leg/arm numbness and or weakness  Loss of control of bowel and or bladder (wet/soil yourself)  Severe headache with inability to lift head  10-You may return to work the next day         Infection After Surgery: Care Instructions  Overview  After surgery, an infection is always possible. It doesn't mean that the surgery didn't go well.  Because an infection can be serious, your doctor has taken steps to manage it.  Your doctor checked the infection and cleaned it if necessary. Your doctor may have made an opening in the area so that the pus can drain out. You may have gauze in the cut so that the area will stay open and keep

## 2025-07-16 DIAGNOSIS — E11.9 TYPE 2 DIABETES MELLITUS WITHOUT COMPLICATION, WITHOUT LONG-TERM CURRENT USE OF INSULIN (HCC): ICD-10-CM

## 2025-07-16 RX ORDER — SEMAGLUTIDE 1.34 MG/ML
INJECTION, SOLUTION SUBCUTANEOUS
Refills: 5 | OUTPATIENT
Start: 2025-07-16

## 2025-07-16 NOTE — TELEPHONE ENCOUNTER
Requested Prescriptions     Pending Prescriptions Disp Refills    OZEMPIC, 1 MG/DOSE, 4 MG/3ML SOPN sc injection [Pharmacy Med Name: OZEMPIC 4 MG/3 ML (1 MG/DOSE)]  5     Sig: INJECT 1MG INTO THE SKIN ONCE A WEEK       Next appt is 8/20/2025  Last appt was 2/11/2025

## 2025-07-22 ENCOUNTER — OFFICE VISIT (OUTPATIENT)
Age: 70
End: 2025-07-22
Payer: MEDICARE

## 2025-07-22 VITALS
BODY MASS INDEX: 25.83 KG/M2 | HEIGHT: 65 IN | HEART RATE: 101 BPM | RESPIRATION RATE: 18 BRPM | WEIGHT: 155 LBS | TEMPERATURE: 97.1 F | DIASTOLIC BLOOD PRESSURE: 70 MMHG | OXYGEN SATURATION: 95 % | SYSTOLIC BLOOD PRESSURE: 131 MMHG

## 2025-07-22 DIAGNOSIS — M16.0 PRIMARY OSTEOARTHRITIS OF BOTH HIPS: ICD-10-CM

## 2025-07-22 DIAGNOSIS — M47.812 CERVICAL SPONDYLOSIS: ICD-10-CM

## 2025-07-22 DIAGNOSIS — M47.816 LUMBAR SPONDYLOSIS: ICD-10-CM

## 2025-07-22 DIAGNOSIS — M25.552 PAIN IN LEFT HIP: Primary | ICD-10-CM

## 2025-07-22 DIAGNOSIS — M50.90 CERVICAL DISC DISORDER: ICD-10-CM

## 2025-07-22 DIAGNOSIS — M51.9 LUMBAR DISC DISORDER: ICD-10-CM

## 2025-07-22 DIAGNOSIS — M47.814 THORACIC SPONDYLOSIS: ICD-10-CM

## 2025-07-22 DIAGNOSIS — G89.4 CHRONIC PAIN SYNDROME: ICD-10-CM

## 2025-07-22 DIAGNOSIS — M41.9 SCOLIOSIS OF THORACIC SPINE, UNSPECIFIED SCOLIOSIS TYPE: ICD-10-CM

## 2025-07-22 DIAGNOSIS — M16.12 PRIMARY OSTEOARTHRITIS OF LEFT HIP: ICD-10-CM

## 2025-07-22 DIAGNOSIS — M54.16 LUMBAR RADICULOPATHY: ICD-10-CM

## 2025-07-22 PROCEDURE — 1123F ACP DISCUSS/DSCN MKR DOCD: CPT | Performed by: PAIN MEDICINE

## 2025-07-22 PROCEDURE — 1160F RVW MEDS BY RX/DR IN RCRD: CPT | Performed by: PAIN MEDICINE

## 2025-07-22 PROCEDURE — 99214 OFFICE O/P EST MOD 30 MIN: CPT | Performed by: PAIN MEDICINE

## 2025-07-22 PROCEDURE — 1159F MED LIST DOCD IN RCRD: CPT | Performed by: PAIN MEDICINE

## 2025-07-22 RX ORDER — HYDROCODONE BITARTRATE AND ACETAMINOPHEN 5; 325 MG/1; MG/1
1 TABLET ORAL DAILY PRN
Qty: 30 TABLET | Refills: 0 | Status: SHIPPED | OUTPATIENT
Start: 2025-07-22 | End: 2025-08-21

## 2025-07-22 NOTE — PROGRESS NOTES
Gabi Orourke presents to the [facility] on 7/22/2025. Gabi is complaining of pain lower back. The pain is constant. The pain is described as aching and dull. Pain is rated on her best day at a 6, on her worst day at a 9, and on average at a 7 on the VAS scale. She took her last dose of Mobic yesterday. Patient stated she is having SE from the Gabapentin so has stopped taking it.      Any procedures since your last visit: Yes, Left hip injection under Xray, with 75 % relief.    She is  on NSAIDS and  is not on anticoagulation medications    Pacemaker or defibrillator: No     Do you want someone present when the provider examines you? No    Medication Contract and Consent for Opioid Use Documents Filed        No documents found                       /70   Pulse (!) 101   Temp 97.1 °F (36.2 °C) (Infrared)   Resp 18   Ht 1.651 m (5' 5\")   Wt 70.3 kg (155 lb)   SpO2 95%   BMI 25.79 kg/m²      Gabi's pulsewas elevated today. She was instructed to contact his primary care provider as soon as possible.    No LMP recorded. Patient has had a hysterectomy.  
patient.  Patient is s/p Left hip injection with good relief.  Continue with Norco 5/325 QD PRN(maintaining daily activities with no signs of abuse or diversion).  Continue with Gabapentin 300 mg but will change to QD.  Continue with Mobic 15 mg QD PRN.  [place]ARRS report reviewed 07/2025/opioid agreement renewal today  Patient encouraged to stay active and to lose weight.  Treatment plan discussed with the patient including medications and procedure side effects.    We discussed with the patient that combining opioids, benzodiazepines, alcohol, illicit drugs or sleep aids increases the risk of respiratory depression including death. We discussed that these medications may cause drowsiness, sedation or dizziness and have counseled the patient not to drive or operate machinery. We have discussed that these medications will be prescribed only by one provider. We have discussed with the patient about age related risk factors and have thoroughly discussed the importance of taking these medications as prescribed. The patient verbalizes understanding.    chasity Don M.D.

## 2025-08-20 ENCOUNTER — OFFICE VISIT (OUTPATIENT)
Dept: PRIMARY CARE CLINIC | Age: 70
End: 2025-08-20
Payer: MEDICARE

## 2025-08-20 VITALS
BODY MASS INDEX: 26.16 KG/M2 | WEIGHT: 157 LBS | HEART RATE: 96 BPM | OXYGEN SATURATION: 98 % | DIASTOLIC BLOOD PRESSURE: 84 MMHG | HEIGHT: 65 IN | TEMPERATURE: 98.2 F | SYSTOLIC BLOOD PRESSURE: 124 MMHG

## 2025-08-20 DIAGNOSIS — E55.9 VITAMIN D DEFICIENCY: ICD-10-CM

## 2025-08-20 DIAGNOSIS — E03.9 ACQUIRED HYPOTHYROIDISM: ICD-10-CM

## 2025-08-20 DIAGNOSIS — E11.9 TYPE 2 DIABETES MELLITUS WITHOUT COMPLICATION, WITHOUT LONG-TERM CURRENT USE OF INSULIN (HCC): Primary | ICD-10-CM

## 2025-08-20 DIAGNOSIS — C50.911 CARCINOMA OF RIGHT BREAST METASTATIC TO AXILLARY LYMPH NODE (HCC): ICD-10-CM

## 2025-08-20 DIAGNOSIS — C77.3 CARCINOMA OF RIGHT BREAST METASTATIC TO AXILLARY LYMPH NODE (HCC): ICD-10-CM

## 2025-08-20 DIAGNOSIS — M16.12 PRIMARY OSTEOARTHRITIS OF LEFT HIP: ICD-10-CM

## 2025-08-20 LAB
ALBUMIN: 3.8 G/DL (ref 3.5–5.2)
ALP BLD-CCNC: 85 U/L (ref 35–104)
ALT SERPL-CCNC: 16 U/L (ref 0–35)
ANION GAP SERPL CALCULATED.3IONS-SCNC: 13 MMOL/L (ref 7–16)
AST SERPL-CCNC: 26 U/L (ref 0–35)
BASOPHILS ABSOLUTE: 0.05 K/UL (ref 0–0.2)
BASOPHILS RELATIVE PERCENT: 1 % (ref 0–2)
BILIRUB SERPL-MCNC: 0.3 MG/DL (ref 0–1.2)
BUN BLDV-MCNC: 14 MG/DL (ref 8–23)
CALCIUM SERPL-MCNC: 9.4 MG/DL (ref 8.8–10.2)
CHLORIDE BLD-SCNC: 102 MMOL/L (ref 98–107)
CHOLESTEROL, TOTAL: 109 MG/DL
CO2: 23 MMOL/L (ref 22–29)
CREAT SERPL-MCNC: 0.6 MG/DL (ref 0.5–1)
EOSINOPHILS ABSOLUTE: 0.07 K/UL (ref 0.05–0.5)
EOSINOPHILS RELATIVE PERCENT: 1 % (ref 0–6)
GFR, ESTIMATED: >90 ML/MIN/1.73M2
GLUCOSE FASTING: 122 MG/DL (ref 74–99)
HBA1C MFR BLD: 5.9 %
HCT VFR BLD CALC: 42.2 % (ref 34–48)
HDLC SERPL-MCNC: 40 MG/DL
HEMOGLOBIN: 13.7 G/DL (ref 11.5–15.5)
IMMATURE GRANULOCYTES %: 0 % (ref 0–5)
IMMATURE GRANULOCYTES ABSOLUTE: <0.03 K/UL (ref 0–0.58)
LDL CHOLESTEROL: 55 MG/DL
LYMPHOCYTES ABSOLUTE: 1.93 K/UL (ref 1.5–4)
LYMPHOCYTES RELATIVE PERCENT: 31 % (ref 20–42)
MCH RBC QN AUTO: 31.8 PG (ref 26–35)
MCHC RBC AUTO-ENTMCNC: 32.5 G/DL (ref 32–34.5)
MCV RBC AUTO: 97.9 FL (ref 80–99.9)
MONOCYTES ABSOLUTE: 0.38 K/UL (ref 0.1–0.95)
MONOCYTES RELATIVE PERCENT: 6 % (ref 2–12)
NEUTROPHILS ABSOLUTE: 3.85 K/UL (ref 1.8–7.3)
NEUTROPHILS RELATIVE PERCENT: 61 % (ref 43–80)
PDW BLD-RTO: 12.9 % (ref 11.5–15)
PLATELET # BLD: 259 K/UL (ref 130–450)
PMV BLD AUTO: 10.3 FL (ref 7–12)
POTASSIUM SERPL-SCNC: 4.7 MMOL/L (ref 3.5–5.1)
RBC # BLD: 4.31 M/UL (ref 3.5–5.5)
SODIUM BLD-SCNC: 138 MMOL/L (ref 136–145)
TOTAL PROTEIN: 6.8 G/DL (ref 6.4–8.3)
TRIGL SERPL-MCNC: 69 MG/DL
TSH SERPL DL<=0.05 MIU/L-ACNC: 2.54 UIU/ML (ref 0.27–4.2)
VITAMIN D 25-HYDROXY: 85.5 NG/ML (ref 30–100)
VLDLC SERPL CALC-MCNC: 14 MG/DL
WBC # BLD: 6.3 K/UL (ref 4.5–11.5)

## 2025-08-20 PROCEDURE — G2211 COMPLEX E/M VISIT ADD ON: HCPCS | Performed by: FAMILY MEDICINE

## 2025-08-20 PROCEDURE — 3044F HG A1C LEVEL LT 7.0%: CPT | Performed by: FAMILY MEDICINE

## 2025-08-20 PROCEDURE — 83036 HEMOGLOBIN GLYCOSYLATED A1C: CPT | Performed by: FAMILY MEDICINE

## 2025-08-20 PROCEDURE — 1123F ACP DISCUSS/DSCN MKR DOCD: CPT | Performed by: FAMILY MEDICINE

## 2025-08-20 PROCEDURE — 36415 COLL VENOUS BLD VENIPUNCTURE: CPT | Performed by: FAMILY MEDICINE

## 2025-08-20 PROCEDURE — 99214 OFFICE O/P EST MOD 30 MIN: CPT | Performed by: FAMILY MEDICINE

## 2025-08-28 ENCOUNTER — OFFICE VISIT (OUTPATIENT)
Age: 70
End: 2025-08-28
Payer: MEDICARE

## 2025-08-28 VITALS
RESPIRATION RATE: 16 BRPM | SYSTOLIC BLOOD PRESSURE: 124 MMHG | BODY MASS INDEX: 26.16 KG/M2 | TEMPERATURE: 97.2 F | DIASTOLIC BLOOD PRESSURE: 60 MMHG | OXYGEN SATURATION: 97 % | HEART RATE: 86 BPM | WEIGHT: 157 LBS | HEIGHT: 65 IN

## 2025-08-28 DIAGNOSIS — M54.16 LUMBAR RADICULOPATHY: ICD-10-CM

## 2025-08-28 DIAGNOSIS — M50.90 CERVICAL DISC DISORDER: ICD-10-CM

## 2025-08-28 DIAGNOSIS — M51.9 LUMBAR DISC DISORDER: ICD-10-CM

## 2025-08-28 DIAGNOSIS — M41.9 SCOLIOSIS OF THORACIC SPINE, UNSPECIFIED SCOLIOSIS TYPE: ICD-10-CM

## 2025-08-28 DIAGNOSIS — M16.0 PRIMARY OSTEOARTHRITIS OF BOTH HIPS: ICD-10-CM

## 2025-08-28 DIAGNOSIS — M47.814 THORACIC SPONDYLOSIS: ICD-10-CM

## 2025-08-28 DIAGNOSIS — G89.4 CHRONIC PAIN SYNDROME: ICD-10-CM

## 2025-08-28 DIAGNOSIS — M25.552 PAIN IN LEFT HIP: Primary | ICD-10-CM

## 2025-08-28 DIAGNOSIS — M47.816 LUMBAR SPONDYLOSIS: ICD-10-CM

## 2025-08-28 DIAGNOSIS — M47.812 CERVICAL SPONDYLOSIS: ICD-10-CM

## 2025-08-28 DIAGNOSIS — M16.12 PRIMARY OSTEOARTHRITIS OF LEFT HIP: ICD-10-CM

## 2025-08-28 PROCEDURE — 99214 OFFICE O/P EST MOD 30 MIN: CPT | Performed by: PAIN MEDICINE

## 2025-08-28 PROCEDURE — 1159F MED LIST DOCD IN RCRD: CPT | Performed by: PAIN MEDICINE

## 2025-08-28 PROCEDURE — 1123F ACP DISCUSS/DSCN MKR DOCD: CPT | Performed by: PAIN MEDICINE

## 2025-08-28 PROCEDURE — 1160F RVW MEDS BY RX/DR IN RCRD: CPT | Performed by: PAIN MEDICINE

## 2025-08-28 RX ORDER — HYDROCODONE BITARTRATE AND ACETAMINOPHEN 5; 325 MG/1; MG/1
1 TABLET ORAL DAILY PRN
Qty: 30 TABLET | Refills: 0 | Status: SHIPPED | OUTPATIENT
Start: 2025-08-28 | End: 2025-09-27

## 2025-08-28 RX ORDER — GABAPENTIN 300 MG/1
300 CAPSULE ORAL NIGHTLY
Qty: 30 CAPSULE | Refills: 0 | Status: SHIPPED | OUTPATIENT
Start: 2025-08-28 | End: 2025-09-27

## 2025-08-28 RX ORDER — SODIUM CHLORIDE 0.9 % (FLUSH) 0.9 %
5-40 SYRINGE (ML) INJECTION EVERY 12 HOURS SCHEDULED
OUTPATIENT
Start: 2025-08-28

## 2025-08-28 RX ORDER — SODIUM CHLORIDE 0.9 % (FLUSH) 0.9 %
5-40 SYRINGE (ML) INJECTION PRN
OUTPATIENT
Start: 2025-08-28

## 2025-08-28 RX ORDER — SODIUM CHLORIDE 9 MG/ML
INJECTION, SOLUTION INTRAVENOUS PRN
OUTPATIENT
Start: 2025-08-28

## (undated) DEVICE — APPLICATOR MEDICATED 10.5 CC SOLUTION HI LT ORNG CHLORAPREP

## (undated) DEVICE — GAUZE,SPONGE,4"X4",12PLY,STERILE,LF,2'S: Brand: MEDLINE

## (undated) DEVICE — 12 ML SYRINGE,LUER-LOCK TIP: Brand: MONOJECT

## (undated) DEVICE — TOWEL,OR,DSP,ST,BLUE,STD,6/PK,12PK/CS: Brand: MEDLINE

## (undated) DEVICE — NEEDLE HYPO 18GA L1.5IN PNK POLYPR HUB S STL REG BVL STR

## (undated) DEVICE — 6 ML SYRINGE LUER-LOCK TIP: Brand: MONOJECT

## (undated) DEVICE — NEEDLE HYPO 27GA L1.25IN GRY POLYPR HUB S STL REG BVL STR

## (undated) DEVICE — 3M™ RED DOT™ MONITORING ELECTRODE WITH FOAM TAPE AND STICKY GEL 2560, 50/BAG, 20/CASE, 72/PLT: Brand: RED DOT™

## (undated) DEVICE — Device: Brand: NIPRO HYPODERMIC NEEDLE

## (undated) DEVICE — NEEDLE SPNL 22GA L5IN BLK HUB S STL W/ QNCKE PNT W/OUT

## (undated) DEVICE — NEEDLE SPNL 22GA L3.5IN BLK HUB S STL REG WALL FIT STYL

## (undated) DEVICE — GLOVE ORANGE PI 7 1/2   MSG9075

## (undated) DEVICE — APPLICATOR MEDICATED 26 CC SOLUTION HI LT ORNG CHLORAPREP

## (undated) DEVICE — BANDAGE ADH W1XL3IN NAT FAB WVN FLX DURABLE N ADH PD SEAL

## (undated) DEVICE — SYRINGE MEDICAL 3ML CLEAR PLASTIC STANDARD NON CONTROL LUERLOCK TIP DISPOSABLE

## (undated) DEVICE — NDL, WHITACRE SPINAL, 22GX3.5": Brand: MEDLINE

## (undated) DEVICE — NEEDLE HYPO 18GA L1.5IN PNK POLYPR HUB S STL THN WALL FILL

## (undated) DEVICE — ELECTRODE SURG MPLR NEUT SELF ADH PT PLT MULTIGEN

## (undated) DEVICE — CVD CANNULA

## (undated) DEVICE — 3 ML SYRINGE LUER-LOCK TIP: Brand: MONOJECT

## (undated) DEVICE — 4-PORT MANIFOLD: Brand: NEPTUNE 2

## (undated) DEVICE — TRAY EPI SGL DOSE 18GA NDL CUST AULTMAN HOSP

## (undated) DEVICE — NEEDLE HYPO 25GA L1.5IN BLU POLYPR HUB S STL REG BVL STR

## (undated) DEVICE — NEEDLE SPNL 25GA L2IN BLU SHT NEO LUM PUNC DISP

## (undated) DEVICE — BANDAGE,ADHESIVE,FABRIC,1"X3",STRL,LF: Brand: CURAD

## (undated) DEVICE — NEEDLE SPNL 22GA L3.5IN BLK HUB S STL REG WALL FIT STYL W/

## (undated) DEVICE — Z DISCONTINUED USE 2132709 NEEDLE HYPO 18GA L1.5IN PNK POLYPR HUB S STL THN WALL FILL